# Patient Record
Sex: MALE | Race: WHITE | Employment: UNEMPLOYED | URBAN - METROPOLITAN AREA
[De-identification: names, ages, dates, MRNs, and addresses within clinical notes are randomized per-mention and may not be internally consistent; named-entity substitution may affect disease eponyms.]

---

## 2017-02-08 ENCOUNTER — OFFICE VISIT (OUTPATIENT)
Dept: FAMILY MEDICINE CLINIC | Facility: CLINIC | Age: 47
End: 2017-02-08

## 2017-02-08 VITALS
BODY MASS INDEX: 33.59 KG/M2 | HEIGHT: 67 IN | DIASTOLIC BLOOD PRESSURE: 78 MMHG | HEART RATE: 78 BPM | RESPIRATION RATE: 16 BRPM | SYSTOLIC BLOOD PRESSURE: 122 MMHG | TEMPERATURE: 98 F | WEIGHT: 214 LBS

## 2017-02-08 DIAGNOSIS — E66.9 OBESITY (BMI 30-39.9): ICD-10-CM

## 2017-02-08 DIAGNOSIS — I10 ESSENTIAL HYPERTENSION: ICD-10-CM

## 2017-02-08 DIAGNOSIS — E78.00 PURE HYPERCHOLESTEROLEMIA: ICD-10-CM

## 2017-02-08 DIAGNOSIS — Z51.81 ENCOUNTER FOR THERAPEUTIC DRUG MONITORING: Primary | ICD-10-CM

## 2017-02-08 PROCEDURE — 99214 OFFICE O/P EST MOD 30 MIN: CPT | Performed by: INTERNAL MEDICINE

## 2017-02-08 RX ORDER — NEBIVOLOL 10 MG/1
10 TABLET ORAL
Qty: 90 TABLET | Refills: 3 | Status: SHIPPED | OUTPATIENT
Start: 2017-02-08 | End: 2018-06-27

## 2017-02-08 RX ORDER — ATORVASTATIN CALCIUM 10 MG/1
10 TABLET, FILM COATED ORAL
Qty: 90 TABLET | Refills: 3 | Status: SHIPPED | OUTPATIENT
Start: 2017-02-08 | End: 2018-06-27

## 2017-02-08 RX ORDER — PHENTERMINE HYDROCHLORIDE 37.5 MG/1
37.5 TABLET ORAL
Qty: 30 TABLET | Refills: 0 | Status: SHIPPED | OUTPATIENT
Start: 2017-02-08 | End: 2017-04-26

## 2017-02-08 RX ORDER — DIBASIC SODIUM PHOSPHATE, MONOBASIC POTASSIUM PHOSPHATE AND MONOBASIC SODIUM PHOSPHATE 852; 155; 130 MG/1; MG/1; MG/1
1 TABLET ORAL DAILY
COMMUNITY
Start: 2017-01-12 | End: 2020-10-29

## 2017-02-08 RX ORDER — DASATINIB 100 MG/1
100 TABLET ORAL DAILY
COMMUNITY
Start: 2017-02-01

## 2017-02-08 NOTE — PROGRESS NOTES
CC: Patient presents with:  Weight Check: up 15 lbs       HPI:   1. Obesity, worsening wt gain, worsening hunger, phentermine used to help. 2. Essential hypertension, doing well on losartan and bystolic  3.  Pure hypercholesterolemia, doing well on li tag     Skin cancer     Headache(784.0)     Flushing     Encounter for therapeutic drug monitoring     Lung mass     Hypokalemia     Otitis externa     Dry mouth     Otitis media     Ear pressure     CML (chronic myelocytic leukemia) (HCC)     Otitis media Obesity (BMI 30-39.9), pt with 15 lbs wt gain since last time was seen for wt loss back in 7/2016. Was on phentermine, trokendi and belviq in the past, total wt loss of 45 lbs, s/p lap band surgery.  Will restart phentermine 37.5mg and do monthly for 3-6 mo

## 2017-02-18 ENCOUNTER — HOSPITAL ENCOUNTER (EMERGENCY)
Age: 47
Discharge: HOME | End: 2017-02-18
Attending: EMERGENCY MEDICINE
Payer: COMMERCIAL

## 2017-02-18 VITALS
OXYGEN SATURATION: 97 % | SYSTOLIC BLOOD PRESSURE: 115 MMHG | BODY MASS INDEX: 32 KG/M2 | HEART RATE: 79 BPM | WEIGHT: 205 LBS | RESPIRATION RATE: 20 BRPM | TEMPERATURE: 98 F | DIASTOLIC BLOOD PRESSURE: 66 MMHG

## 2017-02-18 DIAGNOSIS — J01.90 ACUTE SINUSITIS, RECURRENCE NOT SPECIFIED, UNSPECIFIED LOCATION: Primary | ICD-10-CM

## 2017-02-18 PROCEDURE — 99283 EMERGENCY DEPT VISIT LOW MDM: CPT

## 2017-02-18 RX ORDER — AMOXICILLIN 875 MG/1
875 TABLET, COATED ORAL 2 TIMES DAILY
Qty: 20 TABLET | Refills: 0 | Status: SHIPPED | OUTPATIENT
Start: 2017-02-18 | End: 2017-02-28

## 2017-02-18 NOTE — ED PROVIDER NOTES
Patient Seen in: THE [facility] Emergency Department In [place]    History   Patient presents with:  Ear Problem Pain (neurosensory)    Stated Complaint: pt states he is having sob. ear pain is on chemo treatments     HPI    Patient is a pleasant 49-year-old differently: 10 mg. Potassium Chloride ER 20 MEQ Oral Tab CR,  Take 1 tablet (20 mEq total) by mouth daily. Losartan Potassium 100 MG Oral Tab,  Take 1 tablet (100 mg total) by mouth daily.    Ascorbic Acid (VITAMIN C) 1000 MG Oral Tab,  Take 1,000 mg SKIN: Skin is warm and dry. EXTREMITIES: The patient moves all 4 extremities freely. No cyanosis, clubbing, or edema. NEUROLOGIC: The patient is awake, alert, and oriented x3. Cranial nerves are grossly intact.  There is no gross motor or sensory def

## 2017-02-18 NOTE — ED INITIAL ASSESSMENT (HPI)
State congestion and having trouble hearing. Thinks that he may have an ear infection.  Has been on chemo for one year

## 2017-04-11 ENCOUNTER — TELEPHONE (OUTPATIENT)
Dept: INTERNAL MEDICINE CLINIC | Facility: CLINIC | Age: 47
End: 2017-04-11

## 2017-04-24 ENCOUNTER — APPOINTMENT (OUTPATIENT)
Dept: LAB | Age: 47
End: 2017-04-24
Attending: INTERNAL MEDICINE
Payer: COMMERCIAL

## 2017-04-24 DIAGNOSIS — I10 ESSENTIAL HYPERTENSION: ICD-10-CM

## 2017-04-24 DIAGNOSIS — E78.00 PURE HYPERCHOLESTEROLEMIA: ICD-10-CM

## 2017-04-24 DIAGNOSIS — E66.9 OBESITY (BMI 30-39.9): ICD-10-CM

## 2017-04-24 DIAGNOSIS — Z51.81 ENCOUNTER FOR THERAPEUTIC DRUG MONITORING: ICD-10-CM

## 2017-04-24 PROCEDURE — 82607 VITAMIN B-12: CPT | Performed by: INTERNAL MEDICINE

## 2017-04-24 PROCEDURE — 80053 COMPREHEN METABOLIC PANEL: CPT | Performed by: INTERNAL MEDICINE

## 2017-04-24 PROCEDURE — 84443 ASSAY THYROID STIM HORMONE: CPT | Performed by: INTERNAL MEDICINE

## 2017-04-24 PROCEDURE — 82306 VITAMIN D 25 HYDROXY: CPT | Performed by: INTERNAL MEDICINE

## 2017-04-24 PROCEDURE — 36415 COLL VENOUS BLD VENIPUNCTURE: CPT | Performed by: INTERNAL MEDICINE

## 2017-04-24 PROCEDURE — 80061 LIPID PANEL: CPT | Performed by: INTERNAL MEDICINE

## 2017-04-26 ENCOUNTER — OFFICE VISIT (OUTPATIENT)
Dept: FAMILY MEDICINE CLINIC | Facility: CLINIC | Age: 47
End: 2017-04-26

## 2017-04-26 VITALS
WEIGHT: 213 LBS | HEART RATE: 88 BPM | BODY MASS INDEX: 33.43 KG/M2 | DIASTOLIC BLOOD PRESSURE: 76 MMHG | SYSTOLIC BLOOD PRESSURE: 118 MMHG | RESPIRATION RATE: 16 BRPM | HEIGHT: 67 IN

## 2017-04-26 DIAGNOSIS — Z51.81 ENCOUNTER FOR THERAPEUTIC DRUG MONITORING: Primary | ICD-10-CM

## 2017-04-26 DIAGNOSIS — E55.9 VITAMIN D DEFICIENCY: ICD-10-CM

## 2017-04-26 DIAGNOSIS — E66.9 OBESITY (BMI 30-39.9): ICD-10-CM

## 2017-04-26 PROCEDURE — 99213 OFFICE O/P EST LOW 20 MIN: CPT | Performed by: INTERNAL MEDICINE

## 2017-04-26 RX ORDER — ERGOCALCIFEROL 1.25 MG/1
50000 CAPSULE ORAL WEEKLY
Qty: 12 CAPSULE | Refills: 3 | Status: SHIPPED | OUTPATIENT
Start: 2017-04-26 | End: 2018-04-26

## 2017-04-26 RX ORDER — PHENTERMINE HYDROCHLORIDE 37.5 MG/1
37.5 TABLET ORAL
Qty: 30 TABLET | Refills: 0 | Status: SHIPPED | OUTPATIENT
Start: 2017-04-26 | End: 2017-08-14

## 2017-04-26 RX ORDER — ESCITALOPRAM OXALATE 10 MG/1
10 TABLET ORAL EVERY MORNING
Qty: 30 TABLET | Refills: 11 | Status: SHIPPED | OUTPATIENT
Start: 2017-04-26 | End: 2018-06-27

## 2017-04-26 NOTE — PROGRESS NOTES
CC: Patient presents with:  Weight Check: down 1 lb       HPI:   Obesity, doing well on phentermine, no chest pain. Current Outpatient Prescriptions:  ergocalciferol 37649 units Oral Cap Take 1 capsule (50,000 Units total) by mouth once a week. therapeutic drug monitoring     Lung mass     Hypokalemia     Otitis externa     Dry mouth     Otitis media     Ear pressure     CML (chronic myelocytic leukemia) (HCC)     Otitis media without spontaneous rupture of tympanic membrane     Colon polyps

## 2017-05-04 ENCOUNTER — CHARTING TRANS (OUTPATIENT)
Dept: OTHER | Age: 47
End: 2017-05-04

## 2017-08-14 ENCOUNTER — OFFICE VISIT (OUTPATIENT)
Dept: FAMILY MEDICINE CLINIC | Facility: CLINIC | Age: 47
End: 2017-08-14

## 2017-08-14 VITALS
TEMPERATURE: 97 F | DIASTOLIC BLOOD PRESSURE: 72 MMHG | BODY MASS INDEX: 35.31 KG/M2 | SYSTOLIC BLOOD PRESSURE: 122 MMHG | HEART RATE: 76 BPM | RESPIRATION RATE: 16 BRPM | HEIGHT: 67 IN | WEIGHT: 225 LBS

## 2017-08-14 DIAGNOSIS — Z51.81 ENCOUNTER FOR THERAPEUTIC DRUG MONITORING: Primary | ICD-10-CM

## 2017-08-14 DIAGNOSIS — E55.9 VITAMIN D DEFICIENCY: ICD-10-CM

## 2017-08-14 DIAGNOSIS — E66.9 OBESITY (BMI 30-39.9): ICD-10-CM

## 2017-08-14 PROCEDURE — 99213 OFFICE O/P EST LOW 20 MIN: CPT | Performed by: INTERNAL MEDICINE

## 2017-08-14 RX ORDER — PHENTERMINE HYDROCHLORIDE 37.5 MG/1
37.5 TABLET ORAL
Qty: 30 TABLET | Refills: 0 | Status: SHIPPED | OUTPATIENT
Start: 2017-08-14 | End: 2017-11-01

## 2017-08-14 NOTE — PROGRESS NOTES
CC: Patient presents with:  Weight Check: gained 12 lbs. Ran out of med 05/26/17. HPI:   Obesity, worsening wt gain, was on phentermine, run out. Doing well on vit D. No chest pain.        Current Outpatient Prescriptions:  Phentermine HCl 37.5 Skin tag     Skin cancer     Headache(784.0)     Flushing     Encounter for therapeutic drug monitoring     Lung mass     Hypokalemia     Otitis externa     Dry mouth     Otitis media     Ear pressure     CML (chronic myelocytic leukemia) (HCC)     Otitis

## 2017-08-30 ENCOUNTER — HOSPITAL ENCOUNTER (OUTPATIENT)
Age: 47
Discharge: HOME | End: 2017-08-30
Attending: FAMILY MEDICINE
Payer: COMMERCIAL

## 2017-08-30 VITALS
HEART RATE: 74 BPM | TEMPERATURE: 98 F | RESPIRATION RATE: 16 BRPM | SYSTOLIC BLOOD PRESSURE: 147 MMHG | OXYGEN SATURATION: 98 % | DIASTOLIC BLOOD PRESSURE: 87 MMHG

## 2017-08-30 DIAGNOSIS — L03.213 PERIORBITAL CELLULITIS OF RIGHT EYE: Primary | ICD-10-CM

## 2017-08-30 DIAGNOSIS — H01.001 BLEPHARITIS OF RIGHT UPPER EYELID, UNSPECIFIED TYPE: ICD-10-CM

## 2017-08-30 PROCEDURE — 99214 OFFICE O/P EST MOD 30 MIN: CPT

## 2017-08-30 PROCEDURE — 99213 OFFICE O/P EST LOW 20 MIN: CPT

## 2017-08-30 RX ORDER — CLINDAMYCIN HYDROCHLORIDE 300 MG/1
300 CAPSULE ORAL 3 TIMES DAILY
Qty: 30 CAPSULE | Refills: 0 | Status: SHIPPED | OUTPATIENT
Start: 2017-08-30 | End: 2017-09-09

## 2017-08-30 RX ORDER — CIPROFLOXACIN HYDROCHLORIDE 3.5 MG/ML
SOLUTION/ DROPS TOPICAL
Qty: 1 BOTTLE | Refills: 0 | Status: SHIPPED | OUTPATIENT
Start: 2017-08-30 | End: 2017-09-05

## 2017-08-30 NOTE — ED PROVIDER NOTES
Patient Seen in: THE [facility] In [facility]    History   Patient presents with:  Eye Problem    Stated Complaint: right eye area swelling    HPI  47 yo M here with R eye swelling and redness   Started as a stye in the eye 1 week ago and rubbed Take 1 tablet by mouth daily. SPRYCEL 100 MG Oral Tab,  Take 1 tablet by mouth daily. Nebivolol HCl (BYSTOLIC) 10 MG Oral Tab,  Take 1 tablet (10 mg total) by mouth once daily.    Atorvastatin Calcium 10 MG Oral Tab,  Take 1 tablet (10 mg total) by mout Normocephalic, atraumatic  EENT: OP - wnl, moist, Nares normal  NECK: FROM, supple  LUNGS: No tachypnea   CV: No tachycardia   ABD: not distended  NEURO: Alert and oriented to person place and time  GAIT: Normal    R eye exam:   - Orbits and periorbital ar from taking the antibiotic  Warm compresses at least 3 times a day, do not touch the eye  Hand hygiene emphasized  Off of work until September 1, 2017  Follow-up with primary care doctor in the next 5-7 days      Disposition and Plan     Clinical Impressio

## 2017-08-30 NOTE — ED INITIAL ASSESSMENT (HPI)
Pt c/o 7 days of erythema and edema to right upper eyelid. States feels sore. No itch.   Visual acuity 20/30 right and 20/30 left

## 2017-09-01 RX ORDER — LOSARTAN POTASSIUM 100 MG/1
TABLET ORAL
Qty: 90 TABLET | Refills: 1 | Status: SHIPPED | OUTPATIENT
Start: 2017-09-01 | End: 2018-06-27

## 2017-09-01 NOTE — TELEPHONE ENCOUNTER
Requesting Losartan Potassium 100mg daily  LOV: 2/8/17 for htn  RTC: 1 month for weight loss  Last Labs: 4/24/17  Filled: 7/27/16 #90 with 3 refills    Future Appointments  Date Time Provider Gray Douglas   9/14/2017 5:15 PM Tyler Acevedo MD [facility] EM

## 2017-09-07 NOTE — TELEPHONE ENCOUNTER
Requesting klor-con   LOV: 8/14/17  RTC: 4 weeks   Last Labs: 4/24/17: 4.2  Filled: 7/27/16 #90 with 3 refills    Future Appointments  Date Time Provider Gray Douglas   9/14/2017 5:15 PM Kirt Morales MD [address]   10/24/2017 3:45 PM Gisela Matthew

## 2017-09-10 RX ORDER — POTASSIUM CHLORIDE 20 MEQ/1
TABLET, EXTENDED RELEASE ORAL
Qty: 90 TABLET | Refills: 3 | Status: SHIPPED | OUTPATIENT
Start: 2017-09-10 | End: 2018-09-11

## 2017-09-14 ENCOUNTER — OFFICE VISIT (OUTPATIENT)
Dept: INTERNAL MEDICINE CLINIC | Facility: CLINIC | Age: 47
End: 2017-09-14

## 2017-09-14 VITALS
WEIGHT: 230 LBS | RESPIRATION RATE: 16 BRPM | HEART RATE: 80 BPM | SYSTOLIC BLOOD PRESSURE: 128 MMHG | DIASTOLIC BLOOD PRESSURE: 78 MMHG | HEIGHT: 67 IN | BODY MASS INDEX: 36.1 KG/M2

## 2017-09-14 DIAGNOSIS — R53.82 CHRONIC FATIGUE: ICD-10-CM

## 2017-09-14 DIAGNOSIS — E66.9 OBESITY (BMI 30-39.9): ICD-10-CM

## 2017-09-14 DIAGNOSIS — Z51.81 ENCOUNTER FOR THERAPEUTIC DRUG MONITORING: Primary | ICD-10-CM

## 2017-09-14 PROBLEM — R53.83 FATIGUE: Status: ACTIVE | Noted: 2017-09-14

## 2017-09-14 PROCEDURE — 99213 OFFICE O/P EST LOW 20 MIN: CPT | Performed by: INTERNAL MEDICINE

## 2017-09-14 RX ORDER — PHENDIMETRAZINE TARTRATE 105 MG/1
CAPSULE, EXTENDED RELEASE ORAL
Qty: 30 CAPSULE | Refills: 0 | Status: SHIPPED | OUTPATIENT
Start: 2017-09-14 | End: 2018-10-22

## 2017-09-14 NOTE — PROGRESS NOTES
CC: Patient presents with:  Weight Check: up 5 lb       HPI:   Obesity, doing well on phentermine. No chest pain. Doesn't think phentermine is helping much.        Current Outpatient Prescriptions:  Phendimetrazine Tartrate  MG Oral Capsule SR 24 Dysplastic nevi     Skin tag     Skin cancer     Headache(784.0)     Flushing     Encounter for therapeutic drug monitoring     Lung mass     Hypokalemia     Otitis externa     Dry mouth     Otitis media     Ear pressure     CML (chronic myelocytic leuke

## 2017-11-01 ENCOUNTER — HOSPITAL ENCOUNTER (OUTPATIENT)
Age: 47
Discharge: HOME | End: 2017-11-01
Attending: FAMILY MEDICINE

## 2017-11-01 VITALS
TEMPERATURE: 99 F | HEART RATE: 86 BPM | DIASTOLIC BLOOD PRESSURE: 84 MMHG | RESPIRATION RATE: 20 BRPM | OXYGEN SATURATION: 98 % | SYSTOLIC BLOOD PRESSURE: 142 MMHG

## 2017-11-01 DIAGNOSIS — J01.00 ACUTE NON-RECURRENT MAXILLARY SINUSITIS: Primary | ICD-10-CM

## 2017-11-01 PROCEDURE — 99214 OFFICE O/P EST MOD 30 MIN: CPT

## 2017-11-01 PROCEDURE — 99213 OFFICE O/P EST LOW 20 MIN: CPT

## 2017-11-01 RX ORDER — AMOXICILLIN AND CLAVULANATE POTASSIUM 875; 125 MG/1; MG/1
875 TABLET, FILM COATED ORAL 2 TIMES DAILY
Qty: 20 TABLET | Refills: 0 | Status: SHIPPED | OUTPATIENT
Start: 2017-11-01 | End: 2018-01-08 | Stop reason: ALTCHOICE

## 2017-11-01 NOTE — ED PROVIDER NOTES
Patient Seen in: THE [facility]    History   Patient presents with:  Ear Problem Pain (neurosensory)    Stated Complaint: 2 1/2 wks ear issues,fever 1 wk ago    HPI    This 78-year-old male with a history for leukemia and is current Comment: once [place]      Review of Systems    Positive for stated complaint: 2 1/2 wks ear issues,fever 1 wk ago  Other systems are as noted in HPI. Constitutional and vital signs reviewed.       All other systems reviewed and negative except as noted a maxillary sinusitis  (primary encounter diagnosis)    Disposition:  Discharge    Follow-up:  Your ENT    Schedule an appointment as soon as possible for a visit in 3 days  If symptoms worsen      Medications Prescribed:  Current Discharge Medication List

## 2018-01-08 PROBLEM — J01.90 ACUTE SINUSITIS: Status: ACTIVE | Noted: 2018-01-08

## 2018-01-09 ENCOUNTER — TELEPHONE (OUTPATIENT)
Dept: FAMILY MEDICINE CLINIC | Facility: CLINIC | Age: 48
End: 2018-01-09

## 2018-01-09 NOTE — TELEPHONE ENCOUNTER
Received medical records request from [facility] for disability determination. Request is for records dated 12-1-16 to present. Faxed to [employer] for processing.

## 2018-08-26 ENCOUNTER — CHARTING TRANS (OUTPATIENT)
Dept: OTHER | Age: 48
End: 2018-08-26

## 2018-09-02 ENCOUNTER — HOSPITAL (OUTPATIENT)
Dept: OTHER | Age: 48
End: 2018-09-02
Attending: EMERGENCY MEDICINE

## 2018-09-02 LAB
ANALYZER ANC (IANC): ABNORMAL
ANION GAP SERPL CALC-SCNC: 12 MMOL/L (ref 10–20)
BASOPHILS # BLD: 0 THOUSAND/MCL (ref 0–0.3)
BASOPHILS NFR BLD: 0 %
BUN SERPL-MCNC: 15 MG/DL (ref 6–20)
BUN/CREAT SERPL: 18 (ref 7–25)
CALCIUM SERPL-MCNC: 8.4 MG/DL (ref 8.4–10.2)
CHLORIDE: 106 MMOL/L (ref 98–107)
CO2 SERPL-SCNC: 27 MMOL/L (ref 21–32)
CREAT SERPL-MCNC: 0.83 MG/DL (ref 0.67–1.17)
DIFFERENTIAL METHOD BLD: ABNORMAL
EOSINOPHIL # BLD: 0.2 THOUSAND/MCL (ref 0.1–0.5)
EOSINOPHIL NFR BLD: 2 %
ERYTHROCYTE [DISTWIDTH] IN BLOOD: 12.5 % (ref 11–15)
GLUCOSE SERPL-MCNC: 92 MG/DL (ref 65–99)
HEMATOCRIT: 39.9 % (ref 39–51)
HGB BLD-MCNC: 13.4 GM/DL (ref 13–17)
LYMPHOCYTES # BLD: 1.7 THOUSAND/MCL (ref 1–4.8)
LYMPHOCYTES NFR BLD: 17 %
MCH RBC QN AUTO: 31.4 PG (ref 26–34)
MCHC RBC AUTO-ENTMCNC: 33.6 GM/DL (ref 32–36.5)
MCV RBC AUTO: 93.4 FL (ref 78–100)
MONOCYTES # BLD: 1.3 THOUSAND/MCL (ref 0.3–0.9)
MONOCYTES NFR BLD: 13 %
NEUTROPHILS # BLD: 6.9 THOUSAND/MCL (ref 1.8–7.7)
NEUTROPHILS NFR BLD: 68 %
NEUTS SEG NFR BLD: ABNORMAL %
NRBC (NRBCRE): ABNORMAL
PLATELET # BLD: 199 THOUSAND/MCL (ref 140–450)
POTASSIUM SERPL-SCNC: 4.3 MMOL/L (ref 3.4–5.1)
RBC # BLD: 4.27 MILLION/MCL (ref 4.5–5.9)
SODIUM SERPL-SCNC: 141 MMOL/L (ref 135–145)
TROPONIN I SERPL HS-MCNC: <0.02 NG/ML
WBC # BLD: 10.1 THOUSAND/MCL (ref 4.2–11)

## 2018-09-03 ENCOUNTER — DIAGNOSTIC TRANS (OUTPATIENT)
Dept: OTHER | Age: 48
End: 2018-09-03

## 2018-10-17 ENCOUNTER — OFFICE VISIT (OUTPATIENT)
Dept: FAMILY MEDICINE CLINIC | Facility: CLINIC | Age: 48
End: 2018-10-17
Payer: COMMERCIAL

## 2018-10-17 VITALS
RESPIRATION RATE: 16 BRPM | OXYGEN SATURATION: 98 % | BODY MASS INDEX: 36.88 KG/M2 | WEIGHT: 235 LBS | SYSTOLIC BLOOD PRESSURE: 144 MMHG | TEMPERATURE: 98 F | HEIGHT: 67 IN | HEART RATE: 78 BPM | DIASTOLIC BLOOD PRESSURE: 98 MMHG

## 2018-10-17 DIAGNOSIS — H92.01 OTALGIA, RIGHT: ICD-10-CM

## 2018-10-17 DIAGNOSIS — Z87.891 FORMER SMOKER: ICD-10-CM

## 2018-10-17 DIAGNOSIS — I10 ESSENTIAL HYPERTENSION: ICD-10-CM

## 2018-10-17 DIAGNOSIS — J01.01 ACUTE RECURRENT MAXILLARY SINUSITIS: Primary | ICD-10-CM

## 2018-10-17 PROCEDURE — 99213 OFFICE O/P EST LOW 20 MIN: CPT | Performed by: NURSE PRACTITIONER

## 2018-10-17 RX ORDER — OFLOXACIN 3 MG/ML
SOLUTION AURICULAR (OTIC)
Qty: 1 BOTTLE | Refills: 0 | Status: SHIPPED | OUTPATIENT
Start: 2018-10-17 | End: 2018-11-07 | Stop reason: ALTCHOICE

## 2018-10-17 RX ORDER — DOXYCYCLINE HYCLATE 100 MG
100 TABLET ORAL 2 TIMES DAILY
Qty: 20 TABLET | Refills: 0 | Status: SHIPPED | OUTPATIENT
Start: 2018-10-17 | End: 2018-10-27

## 2018-10-17 RX ORDER — METHYLPREDNISOLONE 4 MG/1
TABLET ORAL
Qty: 1 KIT | Refills: 0 | Status: SHIPPED | OUTPATIENT
Start: 2018-10-17 | End: 2018-11-07 | Stop reason: ALTCHOICE

## 2018-10-17 NOTE — PROGRESS NOTES
CHIEF COMPLAINT:   Patient presents with:  URI: x6 weeks. HPI:   Mary Lugo is a 50year old male who presents for sinus, cough, and RT ear symptoms for essentially  6 weeks.  He was initially seen at [facility] clinic around time of onset, treatmanny ESCITALOPRAM 10 MG Oral Tab TAKE ONE TABLET BY MOUTH EVERY MORNING Disp: 30 tablet Rfl: 11   Phentermine HCl 37.5 MG Oral Tab Take 1 tablet (37.5 mg total) by mouth every morning before breakfast. Disp: 30 tablet Rfl: 0   Phendimetrazine Tartrate  MG LUNGS: denies shortness of breath or wheezing, See HPI  CARDIOVASCULAR: denies chest pain or palpitations   GI: denies N/V/C or abdominal pain  NEURO: Denies headaches    EXAM:   BP (!) 144/98   Pulse 78   Temp 98.4 °F (36.9 °C) (Oral)   Resp 16   Ht 67\" - Breathing has been stable since last ER visit, he still has albuterol if needed but has not needed it recently. - Other comfort/OTC care as described in Patient Instructions. - Advised F/U with PCP on his elevated BP/HTN.   Pt admits he has noticed it r The sinuses are air-filled spaces within the bones of the face. They connect to the inside of the nose. Sinusitis is an inflammation of the tissue that lines the sinuses. Sinusitis can occur during a cold.  It can also happen due to allergies to pollens and · Do not use nasal rinses or irrigation during an acute sinus infection, unless your healthcare provider tells you to. Rinsing may spread the infection to other areas in your sinuses.   · Use acetaminophen or ibuprofen to control pain, unless another pain m Earaches can happen without an infection. This occurs when air and fluid build up behind the eardrum causing a feeling of fullness and discomfort and reduced hearing. This is called otitis media with effusion (OME) or serous otitis media.  It means there is · You may use over-the-counter decongestants such as phenylephrine or pseudoephedrine. But they are not always helpful. Don't use nasal spray decongestants more than 3 days. Longer use can make congestion worse.  Prescription nasal sprays from your doctor d

## 2018-10-17 NOTE — PATIENT INSTRUCTIONS
Sinusitis (Antibiotic Treatment)    The sinuses are air-filled spaces within the bones of the face. They connect to the inside of the nose. Sinusitis is an inflammation of the tissue that lines the sinuses. Sinusitis can occur during a cold.  It can also · Do not use nasal rinses or irrigation during an acute sinus infection, unless your healthcare provider tells you to. Rinsing may spread the infection to other areas in your sinuses.   · Use acetaminophen or ibuprofen to control pain, unless another pain m Earaches can happen without an infection. This occurs when air and fluid build up behind the eardrum causing a feeling of fullness and discomfort and reduced hearing. This is called otitis media with effusion (OME) or serous otitis media.  It means there is · You may use over-the-counter decongestants such as phenylephrine or pseudoephedrine. But they are not always helpful. Don't use nasal spray decongestants more than 3 days. Longer use can make congestion worse.  Prescription nasal sprays from your doctor d

## 2018-10-22 PROCEDURE — 83835 ASSAY OF METANEPHRINES: CPT | Performed by: INTERNAL MEDICINE

## 2018-10-22 PROCEDURE — 84244 ASSAY OF RENIN: CPT | Performed by: INTERNAL MEDICINE

## 2018-10-22 PROCEDURE — 82088 ASSAY OF ALDOSTERONE: CPT | Performed by: INTERNAL MEDICINE

## 2018-10-31 PROCEDURE — 83835 ASSAY OF METANEPHRINES: CPT | Performed by: INTERNAL MEDICINE

## 2018-10-31 PROCEDURE — 36415 COLL VENOUS BLD VENIPUNCTURE: CPT | Performed by: INTERNAL MEDICINE

## 2018-11-02 PROCEDURE — 36415 COLL VENOUS BLD VENIPUNCTURE: CPT | Performed by: INTERNAL MEDICINE

## 2018-11-02 PROCEDURE — 82533 TOTAL CORTISOL: CPT | Performed by: INTERNAL MEDICINE

## 2018-11-03 VITALS
HEART RATE: 90 BPM | SYSTOLIC BLOOD PRESSURE: 124 MMHG | TEMPERATURE: 99 F | BODY MASS INDEX: 32.17 KG/M2 | WEIGHT: 204.99 LBS | DIASTOLIC BLOOD PRESSURE: 78 MMHG | OXYGEN SATURATION: 97 % | HEIGHT: 67 IN

## 2018-11-27 VITALS
DIASTOLIC BLOOD PRESSURE: 90 MMHG | RESPIRATION RATE: 16 BRPM | WEIGHT: 204 LBS | TEMPERATURE: 99.1 F | HEART RATE: 80 BPM | BODY MASS INDEX: 31.95 KG/M2 | SYSTOLIC BLOOD PRESSURE: 148 MMHG

## 2018-12-07 ENCOUNTER — OFFICE VISIT (OUTPATIENT)
Dept: FAMILY MEDICINE CLINIC | Facility: CLINIC | Age: 48
End: 2018-12-07
Payer: COMMERCIAL

## 2018-12-07 VITALS
WEIGHT: 230 LBS | RESPIRATION RATE: 14 BRPM | HEIGHT: 67 IN | OXYGEN SATURATION: 98 % | HEART RATE: 89 BPM | TEMPERATURE: 98 F | BODY MASS INDEX: 36.1 KG/M2 | DIASTOLIC BLOOD PRESSURE: 86 MMHG | SYSTOLIC BLOOD PRESSURE: 120 MMHG

## 2018-12-07 DIAGNOSIS — H92.03 OTALGIA, BILATERAL: ICD-10-CM

## 2018-12-07 DIAGNOSIS — J01.00 ACUTE MAXILLARY SINUSITIS, RECURRENCE NOT SPECIFIED: Primary | ICD-10-CM

## 2018-12-07 PROCEDURE — 99213 OFFICE O/P EST LOW 20 MIN: CPT | Performed by: PHYSICIAN ASSISTANT

## 2018-12-07 RX ORDER — OFLOXACIN 3 MG/ML
10 SOLUTION AURICULAR (OTIC) DAILY
Qty: 10 ML | Refills: 0 | Status: SHIPPED | OUTPATIENT
Start: 2018-12-07 | End: 2019-11-05 | Stop reason: ALTCHOICE

## 2018-12-07 RX ORDER — DOXYCYCLINE HYCLATE 100 MG/1
100 CAPSULE ORAL 2 TIMES DAILY
Qty: 20 CAPSULE | Refills: 0 | Status: SHIPPED | OUTPATIENT
Start: 2018-12-07 | End: 2018-12-17

## 2018-12-07 RX ORDER — METHYLPREDNISOLONE 4 MG/1
TABLET ORAL
Qty: 1 KIT | Refills: 0 | Status: SHIPPED | OUTPATIENT
Start: 2018-12-07 | End: 2019-11-05 | Stop reason: ALTCHOICE

## 2018-12-07 NOTE — PROGRESS NOTES
CHIEF COMPLAINT:   Patient presents with:  Sinus Problem: 2 weeks, maxillary sinus pain/pressure, purulent nasal drainage, 102 fever a couple days ago       HPI:   Shemar Vinson is a 50year old male who presents for sinus congestion for 2 weeks.  Symptom LOSARTAN 100 MG Oral Tab TAKE ONE TABLET BY MOUTH DAILY Disp: 30 tablet Rfl: 11   ESCITALOPRAM 10 MG Oral Tab TAKE ONE TABLET BY MOUTH EVERY MORNING Disp: 30 tablet Rfl: 11   PHOSPHA 250 NEUTRAL 155-852-130 MG Oral Tab Take 1 tablet by mouth daily.  Disp: numbness or tingling in face.     EXAM:   /86   Pulse 89   Temp 97.9 °F (36.6 °C) (Oral)   Resp 14   SpO2 98%   GENERAL: well developed, well nourished,in no apparent distress  SKIN: no rashes,no suspicious lesions  HEAD: atraumatic, normocephalic,  + Prescriptions Disp Refills   • Doxycycline Hyclate 100 MG Oral Cap 20 capsule 0     Sig: Take 1 capsule (100 mg total) by mouth 2 (two) times daily for 10 days. • methylPREDNISolone (MEDROL) 4 MG Oral Tablet Therapy Pack 1 kit 0     Sig: As directed.    • your throat. · Avoid pseudoephedrine or phenylephrine if you have heart problems or blood pressure issues like hypertension. These medications will raise your blood pressure.   · Female only: If taking birth control - Use back up birth control for pregna

## 2019-09-25 ENCOUNTER — WALK IN (OUTPATIENT)
Dept: URGENT CARE | Age: 49
End: 2019-09-25

## 2019-09-25 VITALS
RESPIRATION RATE: 16 BRPM | BODY MASS INDEX: 41.82 KG/M2 | DIASTOLIC BLOOD PRESSURE: 82 MMHG | HEART RATE: 72 BPM | WEIGHT: 266.43 LBS | TEMPERATURE: 98.4 F | SYSTOLIC BLOOD PRESSURE: 140 MMHG | HEIGHT: 67 IN

## 2019-09-25 DIAGNOSIS — B96.89 ACUTE BACTERIAL SINUSITIS: ICD-10-CM

## 2019-09-25 DIAGNOSIS — J01.90 ACUTE BACTERIAL SINUSITIS: ICD-10-CM

## 2019-09-25 DIAGNOSIS — H66.002 ACUTE SUPPURATIVE OTITIS MEDIA OF LEFT EAR WITHOUT SPONTANEOUS RUPTURE OF TYMPANIC MEMBRANE, RECURRENCE NOT SPECIFIED: Primary | ICD-10-CM

## 2019-09-25 PROCEDURE — X0943 AMG SELF PAY VISIT: HCPCS | Performed by: NURSE PRACTITIONER

## 2019-09-25 RX ORDER — ERGOCALCIFEROL 1.25 MG/1
CAPSULE ORAL
COMMUNITY
Start: 2018-10-29

## 2019-09-25 RX ORDER — AMOXICILLIN AND CLAVULANATE POTASSIUM 875; 125 MG/1; MG/1
1 TABLET, FILM COATED ORAL 2 TIMES DAILY
Qty: 20 TABLET | Refills: 0 | Status: SHIPPED | OUTPATIENT
Start: 2019-09-25 | End: 2019-10-05

## 2019-09-25 RX ORDER — CARVEDILOL 12.5 MG/1
12.5 TABLET ORAL
COMMUNITY
Start: 2019-09-02 | End: 2020-08-27

## 2019-09-25 RX ORDER — CLONAZEPAM 1 MG/1
1 TABLET ORAL
COMMUNITY
Start: 2017-01-17

## 2019-09-25 RX ORDER — ATORVASTATIN CALCIUM 10 MG/1
TABLET, FILM COATED ORAL
COMMUNITY
Start: 2015-09-01

## 2019-09-25 RX ORDER — ESCITALOPRAM OXALATE 10 MG/1
TABLET ORAL
COMMUNITY
Start: 2018-07-29

## 2019-09-25 RX ORDER — LOSARTAN POTASSIUM 100 MG/1
TABLET ORAL
COMMUNITY
Start: 2018-07-29

## 2019-09-25 RX ORDER — HYDROCHLOROTHIAZIDE 25 MG/1
25 TABLET ORAL
COMMUNITY
Start: 2018-10-22 | End: 2019-10-17

## 2019-09-25 ASSESSMENT — ENCOUNTER SYMPTOMS
RHINORRHEA: 1
EYES NEGATIVE: 1
SINUS PRESSURE: 1
HEADACHES: 1
WHEEZING: 0
COUGH: 0
SORE THROAT: 1
SHORTNESS OF BREATH: 0
CHILLS: 0
RESPIRATORY NEGATIVE: 1
ALLERGIC/IMMUNOLOGIC NEGATIVE: 1
FEVER: 1
TROUBLE SWALLOWING: 0

## 2019-10-21 ENCOUNTER — HOSPITAL (OUTPATIENT)
Dept: OTHER | Age: 49
End: 2019-10-21

## 2019-10-21 ENCOUNTER — DIAGNOSTIC TRANS (OUTPATIENT)
Dept: OTHER | Age: 49
End: 2019-10-21

## 2019-10-21 LAB
ANALYZER ANC (IANC): ABNORMAL
ANION GAP SERPL CALC-SCNC: 10 MMOL/L (ref 10–20)
BASOPHILS # BLD: 0 K/MCL (ref 0–0.3)
BASOPHILS NFR BLD: 1 %
BUN SERPL-MCNC: 13 MG/DL (ref 6–20)
BUN/CREAT SERPL: 20 (ref 7–25)
CALCIUM SERPL-MCNC: 8.4 MG/DL (ref 8.4–10.2)
CHLORIDE SERPL-SCNC: 109 MMOL/L (ref 98–107)
CO2 SERPL-SCNC: 24 MMOL/L (ref 21–32)
CREAT SERPL-MCNC: 0.66 MG/DL (ref 0.67–1.17)
D DIMER PPP FEU-MCNC: 0.31 MG/L (FEU)
D DIMER PPP FEU-MCNC: NORMAL
DIFFERENTIAL METHOD BLD: ABNORMAL
EOSINOPHIL # BLD: 0.2 K/MCL (ref 0.1–0.5)
EOSINOPHIL NFR BLD: 2 %
ERYTHROCYTE [DISTWIDTH] IN BLOOD: 13.3 % (ref 11–15)
GLUCOSE SERPL-MCNC: 150 MG/DL (ref 65–99)
HCT VFR BLD CALC: 39 % (ref 39–51)
HGB BLD-MCNC: 12.9 G/DL (ref 13–17)
IMM GRANULOCYTES # BLD AUTO: 0 K/MCL (ref 0–0.2)
IMM GRANULOCYTES NFR BLD: 0 %
LYMPHOCYTES # BLD: 1 K/MCL (ref 1–4.8)
LYMPHOCYTES NFR BLD: 15 %
MCH RBC QN AUTO: 29.4 PG (ref 26–34)
MCHC RBC AUTO-ENTMCNC: 33.1 G/DL (ref 32–36.5)
MCV RBC AUTO: 88.8 FL (ref 78–100)
MONOCYTES # BLD: 0.5 K/MCL (ref 0.3–0.9)
MONOCYTES NFR BLD: 7 %
NEUTROPHILS # BLD: 5 K/MCL (ref 1.8–7.7)
NEUTROPHILS NFR BLD: 75 %
NEUTS SEG NFR BLD: ABNORMAL %
NRBC (NRBCRE): 0 /100 WBC
NT-PROBNP SERPL-MCNC: 44 PG/ML
PLATELET # BLD: 184 K/MCL (ref 140–450)
POTASSIUM SERPL-SCNC: 3.4 MMOL/L (ref 3.4–5.1)
RBC # BLD: 4.39 MIL/MCL (ref 4.5–5.9)
SODIUM SERPL-SCNC: 140 MMOL/L (ref 135–145)
TROPONIN I SERPL HS-MCNC: <0.02 NG/ML
WBC # BLD: 6.8 K/MCL (ref 4.2–11)

## 2019-11-26 ENCOUNTER — HOSPITAL (OUTPATIENT)
Dept: OTHER | Age: 49
End: 2019-11-26

## 2019-11-26 ENCOUNTER — DIAGNOSTIC TRANS (OUTPATIENT)
Dept: OTHER | Age: 49
End: 2019-11-26

## 2019-11-26 LAB
ALBUMIN SERPL-MCNC: 3.7 G/DL (ref 3.6–5.1)
ALBUMIN/GLOB SERPL: 1 {RATIO} (ref 1–2.4)
ALP SERPL-CCNC: 99 UNITS/L (ref 45–117)
ALT SERPL-CCNC: 28 UNITS/L
ANALYZER ANC (IANC): ABNORMAL
ANION GAP SERPL CALC-SCNC: 9 MMOL/L (ref 10–20)
AST SERPL-CCNC: 19 UNITS/L
BASOPHILS # BLD: 0 K/MCL (ref 0–0.3)
BASOPHILS NFR BLD: 0 %
BILIRUB SERPL-MCNC: 0.6 MG/DL (ref 0.2–1)
BUN SERPL-MCNC: 17 MG/DL (ref 6–20)
BUN/CREAT SERPL: 21 (ref 7–25)
CALCIUM SERPL-MCNC: 8.3 MG/DL (ref 8.4–10.2)
CHLORIDE SERPL-SCNC: 108 MMOL/L (ref 98–107)
CO2 SERPL-SCNC: 28 MMOL/L (ref 21–32)
CREAT SERPL-MCNC: 0.81 MG/DL (ref 0.67–1.17)
DIFFERENTIAL METHOD BLD: ABNORMAL
EOSINOPHIL # BLD: 0.1 K/MCL (ref 0.1–0.5)
EOSINOPHIL NFR BLD: 1 %
ERYTHROCYTE [DISTWIDTH] IN BLOOD: 13.7 % (ref 11–15)
GLOBULIN SER-MCNC: 3.6 G/DL (ref 2–4)
GLUCOSE SERPL-MCNC: 113 MG/DL (ref 65–99)
HCT VFR BLD CALC: 38.9 % (ref 39–51)
HGB BLD-MCNC: 12.7 G/DL (ref 13–17)
IMM GRANULOCYTES # BLD AUTO: 0 K/MCL (ref 0–0.2)
IMM GRANULOCYTES NFR BLD: 0 %
LYMPHOCYTES # BLD: 1.1 K/MCL (ref 1–4.8)
LYMPHOCYTES NFR BLD: 15 %
MCH RBC QN AUTO: 30 PG (ref 26–34)
MCHC RBC AUTO-ENTMCNC: 32.6 G/DL (ref 32–36.5)
MCV RBC AUTO: 91.7 FL (ref 78–100)
MONOCYTES # BLD: 0.8 K/MCL (ref 0.3–0.9)
MONOCYTES NFR BLD: 10 %
NEUTROPHILS # BLD: 5.4 K/MCL (ref 1.8–7.7)
NEUTROPHILS NFR BLD: 74 %
NEUTS SEG NFR BLD: ABNORMAL %
NRBC (NRBCRE): 0 /100 WBC
PLATELET # BLD: 205 K/MCL (ref 140–450)
POTASSIUM SERPL-SCNC: 3.7 MMOL/L (ref 3.4–5.1)
POTASSIUM SERPL-SCNC: ABNORMAL MMOL/L
PROT SERPL-MCNC: 7.3 G/DL (ref 6.4–8.2)
RBC # BLD: 4.24 MIL/MCL (ref 4.5–5.9)
SODIUM SERPL-SCNC: 141 MMOL/L (ref 135–145)
TROPONIN I SERPL HS-MCNC: <0.02 NG/ML
WBC # BLD: 7.5 K/MCL (ref 4.2–11)

## 2019-11-27 ENCOUNTER — TELEPHONE (OUTPATIENT)
Dept: NEUROSURGERY | Age: 49
End: 2019-11-27

## 2019-11-30 ENCOUNTER — HOSPITAL (OUTPATIENT)
Dept: OTHER | Age: 49
End: 2019-11-30

## 2019-11-30 ENCOUNTER — DIAGNOSTIC TRANS (OUTPATIENT)
Dept: OTHER | Age: 49
End: 2019-11-30

## 2019-11-30 LAB
ANALYZER ANC (IANC): ABNORMAL
ANION GAP SERPL CALC-SCNC: 8 MMOL/L (ref 10–20)
BASOPHILS # BLD: 0.1 K/MCL (ref 0–0.3)
BASOPHILS NFR BLD: 1 %
BUN SERPL-MCNC: 17 MG/DL (ref 6–20)
BUN/CREAT SERPL: 19 (ref 7–25)
CALCIUM SERPL-MCNC: 8.2 MG/DL (ref 8.4–10.2)
CHLORIDE SERPL-SCNC: 108 MMOL/L (ref 98–107)
CO2 SERPL-SCNC: 27 MMOL/L (ref 21–32)
CREAT SERPL-MCNC: 0.88 MG/DL (ref 0.67–1.17)
DIFFERENTIAL METHOD BLD: ABNORMAL
EOSINOPHIL # BLD: 0.2 K/MCL (ref 0.1–0.5)
EOSINOPHIL NFR BLD: 3 %
ERYTHROCYTE [DISTWIDTH] IN BLOOD: 13.5 % (ref 11–15)
GLUCOSE SERPL-MCNC: 118 MG/DL (ref 65–99)
HCT VFR BLD CALC: 36.5 % (ref 39–51)
HGB BLD-MCNC: 12.2 G/DL (ref 13–17)
IMM GRANULOCYTES # BLD AUTO: 0 K/MCL (ref 0–0.2)
IMM GRANULOCYTES NFR BLD: 0 %
LYMPHOCYTES # BLD: 1.8 K/MCL (ref 1–4.8)
LYMPHOCYTES NFR BLD: 23 %
MCH RBC QN AUTO: 30.3 PG (ref 26–34)
MCHC RBC AUTO-ENTMCNC: 33.4 G/DL (ref 32–36.5)
MCV RBC AUTO: 90.8 FL (ref 78–100)
MONOCYTES # BLD: 0.8 K/MCL (ref 0.3–0.9)
MONOCYTES NFR BLD: 10 %
NEUTROPHILS # BLD: 5 K/MCL (ref 1.8–7.7)
NEUTROPHILS NFR BLD: 63 %
NEUTS SEG NFR BLD: ABNORMAL %
NRBC (NRBCRE): 0 /100 WBC
PLATELET # BLD: 189 K/MCL (ref 140–450)
POTASSIUM SERPL-SCNC: 3.6 MMOL/L (ref 3.4–5.1)
RBC # BLD: 4.02 MIL/MCL (ref 4.5–5.9)
SODIUM SERPL-SCNC: 139 MMOL/L (ref 135–145)
TROPONIN I SERPL HS-MCNC: <0.02 NG/ML
WBC # BLD: 7.8 K/MCL (ref 4.2–11)

## 2019-12-05 ENCOUNTER — IMAGING SERVICES (OUTPATIENT)
Dept: GENERAL RADIOLOGY | Age: 49
End: 2019-12-05

## 2019-12-05 ENCOUNTER — HOSPITAL (OUTPATIENT)
Dept: OTHER | Age: 49
End: 2019-12-05
Attending: PHYSICIAN ASSISTANT

## 2019-12-05 ENCOUNTER — OFFICE VISIT (OUTPATIENT)
Dept: NEUROSURGERY | Age: 49
End: 2019-12-05

## 2019-12-05 VITALS
OXYGEN SATURATION: 96 % | HEART RATE: 78 BPM | RESPIRATION RATE: 20 BRPM | DIASTOLIC BLOOD PRESSURE: 94 MMHG | SYSTOLIC BLOOD PRESSURE: 164 MMHG | BODY MASS INDEX: 41.75 KG/M2 | WEIGHT: 266 LBS | HEIGHT: 67 IN

## 2019-12-05 DIAGNOSIS — M54.6 CHRONIC RIGHT-SIDED THORACIC BACK PAIN: Primary | ICD-10-CM

## 2019-12-05 DIAGNOSIS — M54.12 CERVICAL RADICULAR PAIN: ICD-10-CM

## 2019-12-05 DIAGNOSIS — G89.29 CHRONIC RIGHT-SIDED THORACIC BACK PAIN: Primary | ICD-10-CM

## 2019-12-05 DIAGNOSIS — G95.9 MYELOPATHY (CMD): ICD-10-CM

## 2019-12-05 DIAGNOSIS — R29.2 HOFFMAN SIGN PRESENT: ICD-10-CM

## 2019-12-05 DIAGNOSIS — C92.10 CHRONIC MYELOID LEUKEMIA (CMD): ICD-10-CM

## 2019-12-05 PROCEDURE — 72072 X-RAY EXAM THORAC SPINE 3VWS: CPT | Performed by: INTERNAL MEDICINE

## 2019-12-05 PROCEDURE — 72040 X-RAY EXAM NECK SPINE 2-3 VW: CPT | Performed by: INTERNAL MEDICINE

## 2019-12-05 PROCEDURE — 99203 OFFICE O/P NEW LOW 30 MIN: CPT | Performed by: PHYSICIAN ASSISTANT

## 2019-12-05 RX ORDER — METHOCARBAMOL 750 MG/1
750 TABLET, FILM COATED ORAL 4 TIMES DAILY PRN
Qty: 30 TABLET | Refills: 0 | Status: SHIPPED | OUTPATIENT
Start: 2019-12-05

## 2019-12-05 RX ORDER — METHYLPREDNISOLONE 4 MG
TABLET, DOSE PACK ORAL
Qty: 21 TABLET | Refills: 0 | Status: SHIPPED | OUTPATIENT
Start: 2019-12-05

## 2019-12-05 ASSESSMENT — ENCOUNTER SYMPTOMS
WEAKNESS: 0
BACK PAIN: 1
NUMBNESS: 0

## 2019-12-10 PROBLEM — E66.813 CLASS 3 SEVERE OBESITY DUE TO EXCESS CALORIES WITH SERIOUS COMORBIDITY AND BODY MASS INDEX (BMI) OF 40.0 TO 44.9 IN ADULT (HCC): Status: ACTIVE | Noted: 2019-12-10

## 2019-12-10 PROBLEM — E66.813 CLASS 3 SEVERE OBESITY DUE TO EXCESS CALORIES WITH SERIOUS COMORBIDITY AND BODY MASS INDEX (BMI) OF 40.0 TO 44.9 IN ADULT: Status: ACTIVE | Noted: 2019-12-10

## 2019-12-10 PROBLEM — E66.01 CLASS 3 SEVERE OBESITY DUE TO EXCESS CALORIES WITH SERIOUS COMORBIDITY AND BODY MASS INDEX (BMI) OF 40.0 TO 44.9 IN ADULT (HCC): Status: ACTIVE | Noted: 2019-12-10

## 2019-12-10 PROBLEM — Z98.84 LAP-BAND SURGERY STATUS: Status: ACTIVE | Noted: 2019-12-10

## 2019-12-12 ENCOUNTER — HOSPITAL (OUTPATIENT)
Dept: OTHER | Age: 49
End: 2019-12-12
Attending: PHYSICIAN ASSISTANT

## 2019-12-12 ENCOUNTER — OFFICE VISIT (OUTPATIENT)
Dept: NEUROSURGERY | Age: 49
End: 2019-12-12

## 2019-12-12 VITALS
HEIGHT: 67 IN | HEART RATE: 77 BPM | WEIGHT: 266 LBS | RESPIRATION RATE: 20 BRPM | BODY MASS INDEX: 41.75 KG/M2 | DIASTOLIC BLOOD PRESSURE: 98 MMHG | SYSTOLIC BLOOD PRESSURE: 170 MMHG | OXYGEN SATURATION: 96 %

## 2019-12-12 DIAGNOSIS — M54.6 CHRONIC RIGHT-SIDED THORACIC BACK PAIN: ICD-10-CM

## 2019-12-12 DIAGNOSIS — M54.12 CERVICAL RADICULAR PAIN: Primary | ICD-10-CM

## 2019-12-12 DIAGNOSIS — G89.29 CHRONIC RIGHT-SIDED THORACIC BACK PAIN: ICD-10-CM

## 2019-12-12 DIAGNOSIS — R29.2 HOFFMAN SIGN PRESENT: ICD-10-CM

## 2019-12-13 ENCOUNTER — TELEPHONE (OUTPATIENT)
Dept: NEUROSURGERY | Age: 49
End: 2019-12-13

## 2019-12-13 DIAGNOSIS — G95.9 MYELOPATHY (CMD): ICD-10-CM

## 2019-12-13 DIAGNOSIS — D49.2 THORACIC SPINE TUMOR: ICD-10-CM

## 2019-12-13 DIAGNOSIS — C92.10 CHRONIC MYELOID LEUKEMIA (CMD): Primary | ICD-10-CM

## 2019-12-17 ENCOUNTER — HOSPITAL (OUTPATIENT)
Dept: OTHER | Age: 49
End: 2019-12-17

## 2019-12-17 ENCOUNTER — HOSPITAL (OUTPATIENT)
Dept: OTHER | Age: 49
End: 2019-12-17
Attending: PHYSICIAN ASSISTANT

## 2019-12-17 LAB
ANALYZER ANC (IANC): ABNORMAL
ANION GAP SERPL CALC-SCNC: 8 MMOL/L (ref 10–20)
BASOPHILS # BLD: 0.1 K/MCL (ref 0–0.3)
BASOPHILS NFR BLD: 1 %
BUN SERPL-MCNC: 15 MG/DL (ref 6–20)
BUN/CREAT SERPL: 17 (ref 7–25)
CALCIUM SERPL-MCNC: 8.1 MG/DL (ref 8.4–10.2)
CHLORIDE SERPL-SCNC: 111 MMOL/L (ref 98–107)
CO2 SERPL-SCNC: 27 MMOL/L (ref 21–32)
CREAT SERPL-MCNC: 0.9 MG/DL (ref 0.67–1.17)
DIFFERENTIAL METHOD BLD: ABNORMAL
EOSINOPHIL # BLD: 0.1 K/MCL (ref 0.1–0.5)
EOSINOPHIL NFR BLD: 1 %
ERYTHROCYTE [DISTWIDTH] IN BLOOD: 14 % (ref 11–15)
GLUCOSE SERPL-MCNC: 116 MG/DL (ref 65–99)
HCT VFR BLD CALC: 38.4 % (ref 39–51)
HGB BLD-MCNC: 12.6 G/DL (ref 13–17)
IMM GRANULOCYTES # BLD AUTO: 0 K/MCL (ref 0–0.2)
IMM GRANULOCYTES NFR BLD: 0 %
LYMPHOCYTES # BLD: 1.3 K/MCL (ref 1–4.8)
LYMPHOCYTES NFR BLD: 13 %
MCH RBC QN AUTO: 29.8 PG (ref 26–34)
MCHC RBC AUTO-ENTMCNC: 32.8 G/DL (ref 32–36.5)
MCV RBC AUTO: 90.8 FL (ref 78–100)
MONOCYTES # BLD: 1.1 K/MCL (ref 0.3–0.9)
MONOCYTES NFR BLD: 11 %
NEUTROPHILS # BLD: 7.6 K/MCL (ref 1.8–7.7)
NEUTROPHILS NFR BLD: 74 %
NEUTS SEG NFR BLD: ABNORMAL %
NRBC (NRBCRE): 0 /100 WBC
PLATELET # BLD: 184 K/MCL (ref 140–450)
POTASSIUM SERPL-SCNC: 3.5 MMOL/L (ref 3.4–5.1)
RBC # BLD: 4.23 MIL/MCL (ref 4.5–5.9)
SODIUM SERPL-SCNC: 142 MMOL/L (ref 135–145)
WBC # BLD: 10.2 K/MCL (ref 4.2–11)

## 2019-12-18 LAB
ALBUMIN SERPL-MCNC: 3.2 G/DL (ref 3.6–5.1)
ALBUMIN/GLOB SERPL: 1 {RATIO} (ref 1–2.4)
ALP SERPL-CCNC: 88 UNITS/L (ref 45–117)
ALT SERPL-CCNC: 26 UNITS/L
ANALYZER ANC (IANC): ABNORMAL
ANION GAP SERPL CALC-SCNC: 7 MMOL/L (ref 10–20)
AST SERPL-CCNC: 14 UNITS/L
BASOPHILS # BLD: 0 K/MCL (ref 0–0.3)
BASOPHILS NFR BLD: 0 %
BILIRUB SERPL-MCNC: 0.7 MG/DL (ref 0.2–1)
BUN SERPL-MCNC: 14 MG/DL (ref 6–20)
BUN/CREAT SERPL: 17 (ref 7–25)
CALCIUM SERPL-MCNC: 8.2 MG/DL (ref 8.4–10.2)
CHLORIDE SERPL-SCNC: 108 MMOL/L (ref 98–107)
CO2 SERPL-SCNC: 30 MMOL/L (ref 21–32)
CREAT SERPL-MCNC: 0.81 MG/DL (ref 0.67–1.17)
DIFFERENTIAL METHOD BLD: ABNORMAL
EOSINOPHIL # BLD: 0.2 K/MCL (ref 0.1–0.5)
EOSINOPHIL NFR BLD: 2 %
ERYTHROCYTE [DISTWIDTH] IN BLOOD: 13.9 % (ref 11–15)
GLOBULIN SER-MCNC: 3.3 G/DL (ref 2–4)
GLUCOSE SERPL-MCNC: 94 MG/DL (ref 65–99)
HCT VFR BLD CALC: 37.2 % (ref 39–51)
HGB BLD-MCNC: 12 G/DL (ref 13–17)
IMM GRANULOCYTES # BLD AUTO: 0 K/MCL (ref 0–0.2)
IMM GRANULOCYTES NFR BLD: 0 %
LYMPHOCYTES # BLD: 1.4 K/MCL (ref 1–4)
LYMPHOCYTES NFR BLD: 14 %
MAGNESIUM SERPL-MCNC: 2.2 MG/DL (ref 1.7–2.4)
MCH RBC QN AUTO: 30 PG (ref 26–34)
MCHC RBC AUTO-ENTMCNC: 32.3 G/DL (ref 32–36.5)
MCV RBC AUTO: 93 FL (ref 78–100)
MONOCYTES # BLD: 1 K/MCL (ref 0.3–0.9)
MONOCYTES NFR BLD: 11 %
NEUTROPHILS # BLD: 6.9 K/MCL (ref 1.8–7.7)
NEUTROPHILS NFR BLD: 73 %
NEUTS SEG NFR BLD: ABNORMAL %
NRBC (NRBCRE): 0 /100 WBC
PLATELET # BLD: 169 K/MCL (ref 140–450)
POTASSIUM SERPL-SCNC: 4 MMOL/L (ref 3.4–5.1)
PROT SERPL-MCNC: 6.5 G/DL (ref 6.4–8.2)
RBC # BLD: 4 MIL/MCL (ref 4.5–5.9)
SODIUM SERPL-SCNC: 141 MMOL/L (ref 135–145)
WBC # BLD: 9.5 K/MCL (ref 4.2–11)

## 2019-12-19 LAB
FOLATE SERPL-MCNC: 8.3 NG/ML
INR PPP: 1.1
INR PPP: NORMAL
M PROTEIN 1 SERPL ELPH-MCNC: ABNORMAL G/L
M PROTEIN 2 SERPL ELPH-MCNC: ABNORMAL G/DL
PATH INTERP SPEC-IMP: ABNORMAL
PROT SERPL-MCNC: 6.3 G/DL (ref 6.4–8.2)
PROTHROMBIN TIME (PRT2): NORMAL
PROTHROMBIN TIME: 11.2 SEC (ref 9.7–11.8)
SPE W IMMUNOFIXATION INTERPRETATION (SPRMXCPRPT): NORMAL
VIT B12 SERPL-MCNC: 383 PG/ML (ref 211–911)

## 2019-12-20 ENCOUNTER — HOSPITAL (OUTPATIENT)
Dept: OTHER | Age: 49
End: 2019-12-20
Attending: PHYSICIAN ASSISTANT

## 2019-12-20 LAB — COPPER SERPL-MCNC: 104 MCG/DL (ref 70–140)

## 2019-12-24 LAB — PATH REPORT, FNA: NORMAL

## 2019-12-27 ENCOUNTER — TELEPHONE (OUTPATIENT)
Dept: NEUROSURGERY | Age: 49
End: 2019-12-27

## 2020-01-02 ENCOUNTER — TELEPHONE (OUTPATIENT)
Dept: NEUROSURGERY | Age: 50
End: 2020-01-02

## 2020-01-09 ENCOUNTER — HOSPITAL (OUTPATIENT)
Dept: OTHER | Age: 50
End: 2020-01-09

## 2020-01-09 LAB
ALBUMIN SERPL-MCNC: 3.3 G/DL (ref 3.6–5.1)
ALBUMIN/GLOB SERPL: 0.9 {RATIO} (ref 1–2.4)
ALP SERPL-CCNC: 101 UNITS/L (ref 45–117)
ALT SERPL-CCNC: 33 UNITS/L
ANALYZER ANC (IANC): ABNORMAL
ANALYZER ANC (IANC): ABNORMAL
ANION GAP SERPL CALC-SCNC: 5 MMOL/L (ref 10–20)
ANION GAP SERPL CALC-SCNC: 6 MMOL/L (ref 10–20)
AST SERPL-CCNC: 21 UNITS/L
AST SERPL-CCNC: ABNORMAL U/L
BASOPHILS # BLD: 0 K/MCL (ref 0–0.3)
BASOPHILS # BLD: 0 K/MCL (ref 0–0.3)
BASOPHILS NFR BLD: 1 %
BASOPHILS NFR BLD: 1 %
BILIRUB SERPL-MCNC: 0.2 MG/DL (ref 0.2–1)
BUN SERPL-MCNC: 15 MG/DL (ref 6–20)
BUN SERPL-MCNC: 18 MG/DL (ref 6–20)
BUN/CREAT SERPL: 22 (ref 7–25)
BUN/CREAT SERPL: 24 (ref 7–25)
CALCIUM SERPL-MCNC: 8.4 MG/DL (ref 8.4–10.2)
CALCIUM SERPL-MCNC: 8.6 MG/DL (ref 8.4–10.2)
CHLORIDE SERPL-SCNC: 110 MMOL/L (ref 98–107)
CHLORIDE SERPL-SCNC: 110 MMOL/L (ref 98–107)
CO2 SERPL-SCNC: 28 MMOL/L (ref 21–32)
CO2 SERPL-SCNC: 28 MMOL/L (ref 21–32)
CREAT SERPL-MCNC: 0.7 MG/DL (ref 0.67–1.17)
CREAT SERPL-MCNC: 0.76 MG/DL (ref 0.67–1.17)
DIFFERENTIAL METHOD BLD: ABNORMAL
DIFFERENTIAL METHOD BLD: ABNORMAL
EOSINOPHIL # BLD: 0.1 K/MCL (ref 0.1–0.5)
EOSINOPHIL # BLD: 0.1 K/MCL (ref 0.1–0.5)
EOSINOPHIL NFR BLD: 2 %
EOSINOPHIL NFR BLD: 2 %
ERYTHROCYTE [DISTWIDTH] IN BLOOD: 13.5 % (ref 11–15)
ERYTHROCYTE [DISTWIDTH] IN BLOOD: 13.5 % (ref 11–15)
GLOBULIN SER-MCNC: 3.6 G/DL (ref 2–4)
GLUCOSE SERPL-MCNC: 119 MG/DL (ref 65–99)
GLUCOSE SERPL-MCNC: 94 MG/DL (ref 65–99)
HCT VFR BLD CALC: 33.4 % (ref 39–51)
HCT VFR BLD CALC: 34.6 % (ref 39–51)
HGB BLD-MCNC: 10.9 G/DL (ref 13–17)
HGB BLD-MCNC: 11.1 G/DL (ref 13–17)
IMM GRANULOCYTES # BLD AUTO: 0 K/MCL (ref 0–0.2)
IMM GRANULOCYTES # BLD AUTO: 0 K/MCL (ref 0–0.2)
IMM GRANULOCYTES NFR BLD: 0 %
IMM GRANULOCYTES NFR BLD: 0 %
LYMPHOCYTES # BLD: 1.2 K/MCL (ref 1–4)
LYMPHOCYTES # BLD: 1.4 K/MCL (ref 1–4)
LYMPHOCYTES NFR BLD: 20 %
LYMPHOCYTES NFR BLD: 22 %
MCH RBC QN AUTO: 29.3 PG (ref 26–34)
MCH RBC QN AUTO: 29.8 PG (ref 26–34)
MCHC RBC AUTO-ENTMCNC: 32.1 G/DL (ref 32–36.5)
MCHC RBC AUTO-ENTMCNC: 32.6 G/DL (ref 32–36.5)
MCV RBC AUTO: 91.3 FL (ref 78–100)
MCV RBC AUTO: 91.3 FL (ref 78–100)
MONOCYTES # BLD: 0.9 K/MCL (ref 0.3–0.9)
MONOCYTES # BLD: 0.9 K/MCL (ref 0.3–0.9)
MONOCYTES NFR BLD: 13 %
MONOCYTES NFR BLD: 17 %
NEUTROPHILS # BLD: 3.2 K/MCL (ref 1.8–7.7)
NEUTROPHILS # BLD: 4.4 K/MCL (ref 1.8–7.7)
NEUTROPHILS NFR BLD: 58 %
NEUTROPHILS NFR BLD: 64 %
NEUTS SEG NFR BLD: ABNORMAL %
NEUTS SEG NFR BLD: ABNORMAL %
NRBC (NRBCRE): 0 /100 WBC
NRBC (NRBCRE): 0 /100 WBC
PLATELET # BLD: 178 K/MCL (ref 140–450)
PLATELET # BLD: 194 K/MCL (ref 140–450)
POTASSIUM SERPL-SCNC: 3.7 MMOL/L (ref 3.4–5.1)
POTASSIUM SERPL-SCNC: 3.9 MMOL/L (ref 3.4–5.1)
POTASSIUM SERPL-SCNC: ABNORMAL MMOL/L
PROT SERPL-MCNC: 6.9 G/DL (ref 6.4–8.2)
RBC # BLD: 3.66 MIL/MCL (ref 4.5–5.9)
RBC # BLD: 3.79 MIL/MCL (ref 4.5–5.9)
SODIUM SERPL-SCNC: 139 MMOL/L (ref 135–145)
SODIUM SERPL-SCNC: 140 MMOL/L (ref 135–145)
WBC # BLD: 5.6 K/MCL (ref 4.2–11)
WBC # BLD: 6.8 K/MCL (ref 4.2–11)

## 2020-01-17 LAB
NORM / CRITICAL FLAG: NORMAL
REF LAB NAME: NORMAL
REF LAB ORDER CODE: NORMAL
REFERENCE RANGE: NORMAL
RESULT (RESF1): NORMAL
TEST NAME: NORMAL
UNIT OF MEASURE: NORMAL

## 2020-01-24 ENCOUNTER — TELEPHONE (OUTPATIENT)
Dept: NEUROSURGERY | Age: 50
End: 2020-01-24

## 2020-10-29 ENCOUNTER — TELEPHONE (OUTPATIENT)
Dept: FAMILY MEDICINE CLINIC | Facility: CLINIC | Age: 50
End: 2020-10-29

## 2020-10-29 ENCOUNTER — OFFICE VISIT (OUTPATIENT)
Dept: FAMILY MEDICINE CLINIC | Facility: CLINIC | Age: 50
End: 2020-10-29
Payer: MEDICAID

## 2020-10-29 VITALS
BODY MASS INDEX: 40.65 KG/M2 | SYSTOLIC BLOOD PRESSURE: 126 MMHG | DIASTOLIC BLOOD PRESSURE: 82 MMHG | WEIGHT: 259 LBS | HEIGHT: 67 IN | TEMPERATURE: 98 F | RESPIRATION RATE: 20 BRPM | HEART RATE: 80 BPM | OXYGEN SATURATION: 98 %

## 2020-10-29 DIAGNOSIS — D22.9 CHANGING NEVUS: Primary | ICD-10-CM

## 2020-10-29 DIAGNOSIS — D22.9 CHANGING NEVUS: ICD-10-CM

## 2020-10-29 DIAGNOSIS — I10 ESSENTIAL HYPERTENSION: ICD-10-CM

## 2020-10-29 DIAGNOSIS — E78.00 PURE HYPERCHOLESTEROLEMIA: ICD-10-CM

## 2020-10-29 DIAGNOSIS — Z00.00 ROUTINE GENERAL MEDICAL EXAMINATION AT A HEALTH CARE FACILITY: Primary | ICD-10-CM

## 2020-10-29 PROCEDURE — 3074F SYST BP LT 130 MM HG: CPT | Performed by: INTERNAL MEDICINE

## 2020-10-29 PROCEDURE — 3008F BODY MASS INDEX DOCD: CPT | Performed by: INTERNAL MEDICINE

## 2020-10-29 PROCEDURE — 99396 PREV VISIT EST AGE 40-64: CPT | Performed by: INTERNAL MEDICINE

## 2020-10-29 PROCEDURE — 3079F DIAST BP 80-89 MM HG: CPT | Performed by: INTERNAL MEDICINE

## 2020-10-29 RX ORDER — NEBIVOLOL 20 MG/1
20 TABLET ORAL
Qty: 90 TABLET | Refills: 3 | Status: SHIPPED | OUTPATIENT
Start: 2020-10-29 | End: 2020-12-17

## 2020-10-29 RX ORDER — DIBASIC SODIUM PHOSPHATE, MONOBASIC POTASSIUM PHOSPHATE AND MONOBASIC SODIUM PHOSPHATE 852; 155; 130 MG/1; MG/1; MG/1
1 TABLET ORAL DAILY
Qty: 90 TABLET | Refills: 3 | Status: SHIPPED | OUTPATIENT
Start: 2020-10-29 | End: 2021-04-05

## 2020-10-29 NOTE — PROGRESS NOTES
Manjeet Paris is a 44year old male new to our practice who presents for a complete physical exam.   HPI:   Pt complains of new mole growing larger on the right side. Hx of skin cancer- 2 sites removed on right body, unsure of the details.    Pt has jessica years   • COLONOSCOPY, POSSIBLE BIOPSY, POSSIBLE POLYPECTOMY 82174 N/A 7/11/2016    Performed by Radha Justice MD at [address] [place] [address]   • LAP GASTRIC BYPASS/LORA-EN-Y     • OTHER SURGICAL HISTORY  2005    Rotator Cuff Repair left side      Family Histor shoulder and flank with keloid type scars from previous skin cancer excisions  HEENT: atraumatic, normocephalic,TMs clear  EYES: PERRLA,conjunctiva clear  NECK: supple,no thyromegaly, no adenopathy  LUNGS: clear to auscultation, easy breathing, no cough  C

## 2020-10-29 NOTE — TELEPHONE ENCOUNTER
Pt cannot get appt with Dr. Napoleon Oneal office until March (with any provider). He's requesting a new referral to [address] [place], [place] [address]on [facility] in [place] on [facility] - he can get an appt next week. He does not have any doctors names with this group.

## 2020-10-29 NOTE — TELEPHONE ENCOUNTER
Referral placed and authorized for   Gera Medina MD [address]  Elham Snowden [phone]     Fred Meyers

## 2020-11-10 ENCOUNTER — LAB ENCOUNTER (OUTPATIENT)
Dept: LAB | Age: 50
End: 2020-11-10
Attending: FAMILY MEDICINE
Payer: MEDICAID

## 2020-11-10 DIAGNOSIS — Z00.00 ROUTINE GENERAL MEDICAL EXAMINATION AT A HEALTH CARE FACILITY: ICD-10-CM

## 2020-11-10 PROCEDURE — 82306 VITAMIN D 25 HYDROXY: CPT

## 2020-11-10 PROCEDURE — 82607 VITAMIN B-12: CPT

## 2020-11-10 PROCEDURE — 85025 COMPLETE CBC W/AUTO DIFF WBC: CPT

## 2020-11-10 PROCEDURE — 83036 HEMOGLOBIN GLYCOSYLATED A1C: CPT

## 2020-11-10 PROCEDURE — 80053 COMPREHEN METABOLIC PANEL: CPT

## 2020-11-10 PROCEDURE — 36415 COLL VENOUS BLD VENIPUNCTURE: CPT

## 2020-11-10 PROCEDURE — 80061 LIPID PANEL: CPT

## 2020-11-10 PROCEDURE — 84443 ASSAY THYROID STIM HORMONE: CPT

## 2020-12-03 ENCOUNTER — TELEPHONE (OUTPATIENT)
Dept: FAMILY MEDICINE CLINIC | Facility: CLINIC | Age: 50
End: 2020-12-03

## 2020-12-03 NOTE — TELEPHONE ENCOUNTER
Ok to take over the counter 50,000 iu Vitamin D once a week. I don't recommend twice a week unless his oncologist says so.

## 2020-12-03 NOTE — TELEPHONE ENCOUNTER
Pt has a script for vit D 50,000 IU and takes two times per week. He wants to know if he can buy OTC. He saw OTC vit D3 50,000 IU and wants to know if he can switch to this?

## 2020-12-03 NOTE — TELEPHONE ENCOUNTER
Called and spoke to patient - instructed him to take 50,000 units once a week. Oncologist did not recommend this - Dr. Ever Horner did.

## 2020-12-16 NOTE — TELEPHONE ENCOUNTER
Approve/deny  Losartan,Atorvastatin and potassium  Last filled by previous MD  Last ov 10/29/2020    Also pt states Bystolic is to expensive. Please advise.

## 2020-12-16 NOTE — TELEPHONE ENCOUNTER
Just started seeing Lula Hammer. Needs refills on medication that still in old doctors name. losartin 100 mg once a day    atordastatin 10 mg once a day    Potasium 20 mg once a day. Also on bystolic. FRANCISCA gave him samples. It is $200. Is there an alternative FRANCISCA can put him on? He can't afford it.     [place]

## 2020-12-17 RX ORDER — ATORVASTATIN CALCIUM 10 MG/1
10 TABLET, FILM COATED ORAL DAILY
Qty: 90 TABLET | Refills: 1 | Status: SHIPPED | OUTPATIENT
Start: 2020-12-17 | End: 2021-08-30

## 2020-12-17 RX ORDER — LOSARTAN POTASSIUM 100 MG/1
100 TABLET ORAL DAILY
Qty: 90 TABLET | Refills: 1 | Status: SHIPPED | OUTPATIENT
Start: 2020-12-17 | End: 2021-07-23

## 2020-12-17 RX ORDER — POTASSIUM CHLORIDE 20 MEQ/1
20 TABLET, EXTENDED RELEASE ORAL DAILY
Qty: 90 TABLET | Refills: 1 | Status: SHIPPED | OUTPATIENT
Start: 2020-12-17 | End: 2021-04-27 | Stop reason: ALTCHOICE

## 2020-12-17 RX ORDER — METOPROLOL TARTRATE 100 MG/1
100 TABLET ORAL 2 TIMES DAILY
Qty: 60 TABLET | Refills: 0 | Status: SHIPPED | OUTPATIENT
Start: 2020-12-17 | End: 2021-03-24

## 2020-12-17 NOTE — TELEPHONE ENCOUNTER
Let pt know the I switched him to the metoprolol 100mg BID, which is the equivalent of bystolic 84QQ. daily. I only sent a month over. He should check his blood pressure at home for the first week (1-2 times a day). Schedule a video visit with me in 2 weeks for BP follow up.

## 2021-01-27 ENCOUNTER — APPOINTMENT (OUTPATIENT)
Dept: CT IMAGING | Facility: HOSPITAL | Age: 51
End: 2021-01-27
Attending: EMERGENCY MEDICINE
Payer: MEDICAID

## 2021-01-27 ENCOUNTER — HOSPITAL ENCOUNTER (EMERGENCY)
Facility: HOSPITAL | Age: 51
Discharge: HOME | End: 2021-01-27
Attending: EMERGENCY MEDICINE
Payer: MEDICAID

## 2021-01-27 VITALS
DIASTOLIC BLOOD PRESSURE: 86 MMHG | WEIGHT: 260 LBS | BODY MASS INDEX: 41 KG/M2 | SYSTOLIC BLOOD PRESSURE: 144 MMHG | RESPIRATION RATE: 18 BRPM | HEART RATE: 85 BPM | OXYGEN SATURATION: 96 % | TEMPERATURE: 98 F

## 2021-01-27 DIAGNOSIS — K57.92 ACUTE DIVERTICULITIS: Primary | ICD-10-CM

## 2021-01-27 LAB
ALBUMIN SERPL-MCNC: 3.7 G/DL (ref 3.4–5)
ALBUMIN/GLOB SERPL: 0.9 {RATIO} (ref 1–2)
ALP LIVER SERPL-CCNC: 124 U/L
ALT SERPL-CCNC: 25 U/L
ANION GAP SERPL CALC-SCNC: 7 MMOL/L (ref 0–18)
AST SERPL-CCNC: 22 U/L (ref 15–37)
BASOPHILS # BLD AUTO: 0.08 X10(3) UL (ref 0–0.2)
BASOPHILS NFR BLD AUTO: 0.9 %
BILIRUB SERPL-MCNC: 0.3 MG/DL (ref 0.1–2)
BILIRUB UR QL STRIP.AUTO: NEGATIVE
BUN BLD-MCNC: 11 MG/DL (ref 7–18)
BUN/CREAT SERPL: 11.5 (ref 10–20)
CALCIUM BLD-MCNC: 8.8 MG/DL (ref 8.5–10.1)
CHLORIDE SERPL-SCNC: 109 MMOL/L (ref 98–112)
CLARITY UR REFRACT.AUTO: CLEAR
CO2 SERPL-SCNC: 23 MMOL/L (ref 21–32)
CREAT BLD-MCNC: 0.96 MG/DL
DEPRECATED RDW RBC AUTO: 41.4 FL (ref 35.1–46.3)
EOSINOPHIL # BLD AUTO: 0.11 X10(3) UL (ref 0–0.7)
EOSINOPHIL NFR BLD AUTO: 1.3 %
ERYTHROCYTE [DISTWIDTH] IN BLOOD BY AUTOMATED COUNT: 12.9 % (ref 11–15)
GLOBULIN PLAS-MCNC: 3.9 G/DL (ref 2.8–4.4)
GLUCOSE BLD-MCNC: 105 MG/DL (ref 70–99)
GLUCOSE UR STRIP.AUTO-MCNC: NEGATIVE MG/DL
HCT VFR BLD AUTO: 41 %
HGB BLD-MCNC: 13.7 G/DL
IMM GRANULOCYTES # BLD AUTO: 0.02 X10(3) UL (ref 0–1)
IMM GRANULOCYTES NFR BLD: 0.2 %
KETONES UR STRIP.AUTO-MCNC: NEGATIVE MG/DL
LEUKOCYTE ESTERASE UR QL STRIP.AUTO: NEGATIVE
LIPASE SERPL-CCNC: 77 U/L (ref 73–393)
LYMPHOCYTES # BLD AUTO: 1.67 X10(3) UL (ref 1–4)
LYMPHOCYTES NFR BLD AUTO: 19 %
M PROTEIN MFR SERPL ELPH: 7.6 G/DL (ref 6.4–8.2)
MCH RBC QN AUTO: 29.5 PG (ref 26–34)
MCHC RBC AUTO-ENTMCNC: 33.4 G/DL (ref 31–37)
MCV RBC AUTO: 88.4 FL
MONOCYTES # BLD AUTO: 0.9 X10(3) UL (ref 0.1–1)
MONOCYTES NFR BLD AUTO: 10.2 %
NEUTROPHILS # BLD AUTO: 6.02 X10 (3) UL (ref 1.5–7.7)
NEUTROPHILS # BLD AUTO: 6.02 X10(3) UL (ref 1.5–7.7)
NEUTROPHILS NFR BLD AUTO: 68.4 %
NITRITE UR QL STRIP.AUTO: NEGATIVE
OSMOLALITY SERPL CALC.SUM OF ELEC: 288 MOSM/KG (ref 275–295)
PH UR STRIP.AUTO: 6 [PH] (ref 4.5–8)
PLATELET # BLD AUTO: 193 10(3)UL (ref 150–450)
POTASSIUM SERPL-SCNC: 3.7 MMOL/L (ref 3.5–5.1)
PROT UR STRIP.AUTO-MCNC: NEGATIVE MG/DL
RBC # BLD AUTO: 4.64 X10(6)UL
RBC UR QL AUTO: NEGATIVE
SODIUM SERPL-SCNC: 139 MMOL/L (ref 136–145)
SP GR UR STRIP.AUTO: 1 (ref 1–1.03)
TROPONIN I SERPL-MCNC: <0.045 NG/ML (ref ?–0.04)
UROBILINOGEN UR STRIP.AUTO-MCNC: <2 MG/DL
WBC # BLD AUTO: 8.8 X10(3) UL (ref 4–11)

## 2021-01-27 PROCEDURE — 96365 THER/PROPH/DIAG IV INF INIT: CPT

## 2021-01-27 PROCEDURE — 85025 COMPLETE CBC W/AUTO DIFF WBC: CPT | Performed by: EMERGENCY MEDICINE

## 2021-01-27 PROCEDURE — 80053 COMPREHEN METABOLIC PANEL: CPT | Performed by: EMERGENCY MEDICINE

## 2021-01-27 PROCEDURE — 99284 EMERGENCY DEPT VISIT MOD MDM: CPT

## 2021-01-27 PROCEDURE — 83690 ASSAY OF LIPASE: CPT | Performed by: EMERGENCY MEDICINE

## 2021-01-27 PROCEDURE — 74176 CT ABD & PELVIS W/O CONTRAST: CPT | Performed by: EMERGENCY MEDICINE

## 2021-01-27 PROCEDURE — 96375 TX/PRO/DX INJ NEW DRUG ADDON: CPT

## 2021-01-27 PROCEDURE — 96361 HYDRATE IV INFUSION ADD-ON: CPT

## 2021-01-27 PROCEDURE — S0030 INJECTION, METRONIDAZOLE: HCPCS

## 2021-01-27 PROCEDURE — 81003 URINALYSIS AUTO W/O SCOPE: CPT | Performed by: EMERGENCY MEDICINE

## 2021-01-27 PROCEDURE — 84484 ASSAY OF TROPONIN QUANT: CPT | Performed by: EMERGENCY MEDICINE

## 2021-01-27 RX ORDER — METRONIDAZOLE 500 MG/100ML
500 INJECTION, SOLUTION INTRAVENOUS ONCE
Status: COMPLETED | OUTPATIENT
Start: 2021-01-27 | End: 2021-01-27

## 2021-01-27 RX ORDER — SODIUM CHLORIDE 9 MG/ML
125 INJECTION, SOLUTION INTRAVENOUS CONTINUOUS
Status: DISCONTINUED | OUTPATIENT
Start: 2021-01-27 | End: 2021-01-27

## 2021-01-27 RX ORDER — CIPROFLOXACIN 500 MG/5ML
500 KIT ORAL 2 TIMES DAILY
Qty: 100 ML | Refills: 0 | Status: SHIPPED | OUTPATIENT
Start: 2021-01-27 | End: 2021-02-06

## 2021-01-27 RX ORDER — METRONIDAZOLE 500 MG/100ML
INJECTION, SOLUTION INTRAVENOUS
Status: COMPLETED
Start: 2021-01-27 | End: 2021-01-27

## 2021-01-27 RX ORDER — KETOROLAC TROMETHAMINE 30 MG/ML
15 INJECTION, SOLUTION INTRAMUSCULAR; INTRAVENOUS ONCE
Status: COMPLETED | OUTPATIENT
Start: 2021-01-27 | End: 2021-01-27

## 2021-01-27 RX ORDER — CIPROFLOXACIN 500 MG/5ML
500 KIT ORAL 2 TIMES DAILY
Qty: 100 ML | Refills: 0 | Status: SHIPPED | OUTPATIENT
Start: 2021-01-27 | End: 2021-01-27

## 2021-01-27 NOTE — ED INITIAL ASSESSMENT (HPI)
50YM c/c of abd pain Pt state that he having LLQ pain since Friday Pt state the pain has gotten worse tonight.  Pt denies NVD or urinary problems

## 2021-01-27 NOTE — ED PROVIDER NOTES
Patient Seen in: [facility] Emergency Department      History   Patient presents with:  Abdomen/Flank Pain    Stated Complaint: LLQ pain    HPI/Subjective:   HPI    Weston Karen is a pleasant 49-year-old male presenting with left lower quadrant pain.   For cyanosis or edema no rash back exam is normal skin was nondiaphoretic       ED Course     Labs Reviewed   COMP METABOLIC PANEL (14) - Abnormal; Notable for the following components:       Result Value    Glucose 105 (*)     Alkaline Phosphatase 124 (*)

## 2021-01-28 ENCOUNTER — TELEPHONE (OUTPATIENT)
Dept: CASE MANAGEMENT | Facility: HOSPITAL | Age: 51
End: 2021-01-28

## 2021-02-26 ENCOUNTER — TELEMEDICINE (OUTPATIENT)
Dept: FAMILY MEDICINE CLINIC | Facility: CLINIC | Age: 51
End: 2021-02-26
Payer: MEDICAID

## 2021-02-26 DIAGNOSIS — H92.01 RIGHT EAR PAIN: ICD-10-CM

## 2021-02-26 DIAGNOSIS — J01.00 ACUTE NON-RECURRENT MAXILLARY SINUSITIS: Primary | ICD-10-CM

## 2021-02-26 PROCEDURE — 99213 OFFICE O/P EST LOW 20 MIN: CPT | Performed by: PHYSICIAN ASSISTANT

## 2021-02-26 RX ORDER — AMOXICILLIN AND CLAVULANATE POTASSIUM 875; 125 MG/1; MG/1
1 TABLET, FILM COATED ORAL 2 TIMES DAILY
Qty: 20 TABLET | Refills: 0 | Status: SHIPPED | OUTPATIENT
Start: 2021-02-26 | End: 2021-03-08

## 2021-02-26 NOTE — PROGRESS NOTES
Video Visit    Emanuel Gorman is a 44year old male. No chief complaint on file. HPI:   Patient with history of leukemia and chronic/recurrent ear infections presents today complaining of sinus symptoms.   For the last week he has had ear pain and pr BIOPSY, POSSIBLE POLYPECTOMY 10302;  Surgeon: Radha Noe MD;  Location: [facility]   • Colonoscopy,remv lesn,snare N/A 7/11/2016    Procedure: COLONOSCOPY, POSSIBLE BIOPSY, POSSIBLE POLYPECTOMY 38801;  Surgeon: Radha Noe MD;  Location: understanding of these issues and agrees to the plan. No follow-ups on file.       Anisa Hernandez PA-C  2/26/2021  12:32 PM    Telehealth Verbal Consent   I conducted a telehealth visit with Orin Duong today, 02/26/21, which was completed using two-

## 2021-03-24 RX ORDER — METOPROLOL TARTRATE 100 MG/1
TABLET ORAL
Qty: 60 TABLET | Refills: 0 | Status: SHIPPED | OUTPATIENT
Start: 2021-03-24 | End: 2021-04-27

## 2021-04-05 ENCOUNTER — OFFICE VISIT (OUTPATIENT)
Dept: FAMILY MEDICINE CLINIC | Facility: CLINIC | Age: 51
End: 2021-04-05
Payer: MEDICAID

## 2021-04-05 ENCOUNTER — TELEPHONE (OUTPATIENT)
Dept: FAMILY MEDICINE CLINIC | Facility: CLINIC | Age: 51
End: 2021-04-05

## 2021-04-05 VITALS
RESPIRATION RATE: 20 BRPM | OXYGEN SATURATION: 97 % | DIASTOLIC BLOOD PRESSURE: 98 MMHG | SYSTOLIC BLOOD PRESSURE: 140 MMHG | HEIGHT: 67 IN | BODY MASS INDEX: 41.91 KG/M2 | TEMPERATURE: 98 F | WEIGHT: 267 LBS | HEART RATE: 76 BPM

## 2021-04-05 DIAGNOSIS — I10 ESSENTIAL HYPERTENSION: ICD-10-CM

## 2021-04-05 DIAGNOSIS — K63.5 POLYP OF COLON, UNSPECIFIED PART OF COLON, UNSPECIFIED TYPE: Primary | ICD-10-CM

## 2021-04-05 DIAGNOSIS — R19.4 BOWEL HABIT CHANGES: ICD-10-CM

## 2021-04-05 PROBLEM — J01.90 ACUTE SINUSITIS: Status: RESOLVED | Noted: 2018-01-08 | Resolved: 2021-04-05

## 2021-04-05 PROCEDURE — 3080F DIAST BP >= 90 MM HG: CPT | Performed by: INTERNAL MEDICINE

## 2021-04-05 PROCEDURE — 99214 OFFICE O/P EST MOD 30 MIN: CPT | Performed by: INTERNAL MEDICINE

## 2021-04-05 PROCEDURE — 3077F SYST BP >= 140 MM HG: CPT | Performed by: INTERNAL MEDICINE

## 2021-04-05 PROCEDURE — 3008F BODY MASS INDEX DOCD: CPT | Performed by: INTERNAL MEDICINE

## 2021-04-05 NOTE — TELEPHONE ENCOUNTER
PT SAW MAYRA TODAY SHE GAVE HIM A REFERRAL FOR A COLONOSCOPY TO [facility]    THEY DO NOT TAKE HIS INSURANCE     PLEASE ADVISE

## 2021-04-05 NOTE — TELEPHONE ENCOUNTER
Pt's ins. Advised him he can see Dr. Loan Chavarria with [facility] - [address] [facility] , [address] in [place].   Pt would like a call to advise when he can sched appt

## 2021-04-05 NOTE — TELEPHONE ENCOUNTER
Called pt and informed him to contact his insurance to find out which GI he can see. Pt agrees and he will let us know.

## 2021-04-05 NOTE — PROGRESS NOTES
Samira Hemphill is a 44year old male. HPI:   Here with change in bowel habits since January. Seen in the ER, CT showed diverticulitis. Treated with Abx and felt better but stools and bowel habits are not back to 'normal'.   Hx of colon polyps and due for soft stools   : denies dysuria, frequency or incontinence  NEURO: denies headaches, denies dizziness; denies numbness or tingling    EXAM:   BP (!) 140/98   Pulse 76   Temp 97.8 °F (36.6 °C) (Other)   Resp 20   Ht 5' 7\" (1.702 m)   Wt 267 lb (121.1 kg)

## 2021-04-07 ENCOUNTER — TELEPHONE (OUTPATIENT)
Dept: FAMILY MEDICINE CLINIC | Facility: CLINIC | Age: 51
End: 2021-04-07

## 2021-04-07 DIAGNOSIS — R19.4 BOWEL HABIT CHANGES: ICD-10-CM

## 2021-04-07 DIAGNOSIS — K63.5 POLYP OF COLON, UNSPECIFIED PART OF COLON, UNSPECIFIED TYPE: Primary | ICD-10-CM

## 2021-04-07 NOTE — TELEPHONE ENCOUNTER
He called his insurance and got a name of someone for the referral for colonoscopy. They can't see him until August.  What should he do?

## 2021-04-08 NOTE — TELEPHONE ENCOUNTER
If [facility] doesn't take his ALFONZO, he can try [address] [facility] [address] specialists.   Otherwise

## 2021-04-08 NOTE — TELEPHONE ENCOUNTER
Patient would like order faxed to:  [facility] and said to thank Meg and Chiquita

## 2021-04-08 NOTE — TELEPHONE ENCOUNTER
He can try [address] [facility] on [address] If they don't take ALFONZO, he'll have to see the person they suggested. It's ok to wait until August. If symptoms worsen he can use the ER.

## 2021-04-27 RX ORDER — METOPROLOL TARTRATE 100 MG/1
TABLET ORAL
Qty: 60 TABLET | Refills: 0 | Status: SHIPPED | OUTPATIENT
Start: 2021-04-27 | End: 2021-07-23

## 2021-05-14 ENCOUNTER — LAB ENCOUNTER (OUTPATIENT)
Dept: LAB | Facility: HOSPITAL | Age: 51
End: 2021-05-14
Attending: INTERNAL MEDICINE
Payer: MEDICAID

## 2021-05-14 DIAGNOSIS — Z86.010 HISTORY OF COLON POLYPS: ICD-10-CM

## 2021-05-16 ENCOUNTER — ANESTHESIA EVENT (OUTPATIENT)
Dept: ENDOSCOPY | Facility: HOSPITAL | Age: 51
End: 2021-05-16
Payer: MEDICAID

## 2021-05-17 ENCOUNTER — ANESTHESIA (OUTPATIENT)
Dept: ENDOSCOPY | Facility: HOSPITAL | Age: 51
End: 2021-05-17
Payer: MEDICAID

## 2021-05-17 ENCOUNTER — HOSPITAL ENCOUNTER (OUTPATIENT)
Facility: HOSPITAL | Age: 51
Setting detail: HOSPITAL OUTPATIENT SURGERY
Discharge: HOME | End: 2021-05-17
Attending: INTERNAL MEDICINE | Admitting: INTERNAL MEDICINE
Payer: MEDICAID

## 2021-05-17 VITALS
HEIGHT: 67 IN | RESPIRATION RATE: 20 BRPM | SYSTOLIC BLOOD PRESSURE: 130 MMHG | OXYGEN SATURATION: 98 % | WEIGHT: 260 LBS | TEMPERATURE: 98 F | HEART RATE: 72 BPM | DIASTOLIC BLOOD PRESSURE: 86 MMHG | BODY MASS INDEX: 40.81 KG/M2

## 2021-05-17 DIAGNOSIS — Z86.010 HISTORY OF COLON POLYPS: Primary | ICD-10-CM

## 2021-05-17 PROCEDURE — 0DJD8ZZ INSPECTION OF LOWER INTESTINAL TRACT, VIA NATURAL OR ARTIFICIAL OPENING ENDOSCOPIC: ICD-10-PCS | Performed by: INTERNAL MEDICINE

## 2021-05-17 RX ORDER — SODIUM CHLORIDE, SODIUM LACTATE, POTASSIUM CHLORIDE, CALCIUM CHLORIDE 600; 310; 30; 20 MG/100ML; MG/100ML; MG/100ML; MG/100ML
INJECTION, SOLUTION INTRAVENOUS CONTINUOUS
Status: DISCONTINUED | OUTPATIENT
Start: 2021-05-17 | End: 2021-05-17

## 2021-05-17 NOTE — ANESTHESIA POSTPROCEDURE EVALUATION
[facility] Corinthia Sly Patient Status:  Hospital Outpatient Surgery   Age/Gender 44year old male MRN [id number]   Location [address] [place] [address] Attending Loan Joseph MD   Hosp Day # 0 PCP Maya Tamayo DO       Anesthesi

## 2021-05-17 NOTE — ANESTHESIA PREPROCEDURE EVALUATION
PRE-OP EVALUATION    Patient Name: Reno Orourke    Admit Diagnosis: History of colon polyps [Z86.010]    Pre-op Diagnosis: History of colon polyps [Z86.010]    COLONOSCOPY    Anesthesia Procedure: COLONOSCOPY (N/A )    Surgeon(s) and Role:     * Darrion Chloe Ceja MD;  Location: [facility]   • COLONOSCOPY,REMV LESN,SNARE N/A 7/11/2016    Procedure: COLONOSCOPY, POSSIBLE BIOPSY, POSSIBLE POLYPECTOMY 96844;  Surgeon: Chloe Ceja MD;  Location: [facility]   • LAP GASTRIC BYPASS/LORA-EN

## 2021-05-17 NOTE — H&P
Warden Guzmán presents for endoscopy. States prep is clear    States no other diverticulitis events     Risk of diverticulitis in setting of cscope discussed.  Role of bowel regimen and fiber for diverticulosis/diverticulitis discused     Role of surg Hugh Clark MD;  Location: [facility]   • LAP GASTRIC BYPASS/LORA-EN-Y     • OTHER SURGICAL HISTORY  2005    Rotator Cuff Repair left side         ROS:  As above. No ha, visual/hearing changes, soar throat.   Denies cp, sob, cough, hematuria, jean

## 2021-05-17 NOTE — BRIEF OP NOTE
Pre-Operative Diagnosis: History of colon polyps [Z86.010]     Post-Operative Diagnosis: diverticulosis      Procedure Performed:   COLONOSCOPY    Surgeon(s) and Role:     * Best Lorenzo MD - Primary    Assistant(s):        Surgical Findings: see op

## 2021-05-17 NOTE — OPERATIVE REPORT
ENDOSCOPY OPERATIVE REPORT    Patient Name:  Bonny Baxter  Medical Record #: [id number]  YOB: 1970  Date of Procedure: 5/17/2021    Preoperative Diagnosis:  Hx of polyp.  diverticulitis    Postoperative Diagnosis: diverticulosis     Proced stable. Specimens:  See above      Condition on discharge from procedure:  good      PLAN:  Patient is to follow a high fiber, low fat diet and followup with primary physician for routine care.     repeat colonoscopy with MAC in 3 years, plan 2 day p

## 2021-05-17 NOTE — PROGRESS NOTES
Lab test for COVID 19 is negative     Sherron Lainez MD  [address] [place] [address] Gastroenterology

## 2021-07-18 ENCOUNTER — HOSPITAL ENCOUNTER (OUTPATIENT)
Age: 51
Discharge: HOME | End: 2021-07-18
Attending: EMERGENCY MEDICINE
Payer: MEDICAID

## 2021-07-18 ENCOUNTER — APPOINTMENT (OUTPATIENT)
Dept: CT IMAGING | Age: 51
End: 2021-07-18
Attending: NURSE PRACTITIONER
Payer: MEDICAID

## 2021-07-18 VITALS
BODY MASS INDEX: 40.81 KG/M2 | DIASTOLIC BLOOD PRESSURE: 101 MMHG | HEART RATE: 75 BPM | OXYGEN SATURATION: 96 % | RESPIRATION RATE: 18 BRPM | HEIGHT: 67 IN | TEMPERATURE: 98 F | WEIGHT: 260 LBS | SYSTOLIC BLOOD PRESSURE: 167 MMHG

## 2021-07-18 DIAGNOSIS — J01.00 ACUTE NON-RECURRENT MAXILLARY SINUSITIS: Primary | ICD-10-CM

## 2021-07-18 LAB
#MXD IC: 1.3 X10ˆ3/UL (ref 0.1–1)
CREAT BLD-MCNC: 0.8 MG/DL
GLUCOSE BLD-MCNC: 112 MG/DL (ref 70–99)
HCT VFR BLD AUTO: 40.6 %
HGB BLD-MCNC: 13.6 G/DL
ISTAT BUN: 16 MG/DL (ref 7–18)
ISTAT CHLORIDE: 107 MMOL/L (ref 98–112)
ISTAT HEMATOCRIT: 41 %
ISTAT IONIZED CALCIUM FOR CHEM 8: 1.21 MMOL/L (ref 1.12–1.32)
ISTAT POTASSIUM: 4.3 MMOL/L (ref 3.6–5.1)
ISTAT SODIUM: 142 MMOL/L (ref 136–145)
ISTAT TCO2: 23 MMOL/L (ref 21–32)
LYMPHOCYTES # BLD AUTO: 1.3 X10ˆ3/UL (ref 1–4)
LYMPHOCYTES NFR BLD AUTO: 16.4 %
MCH RBC QN AUTO: 30.6 PG (ref 26–34)
MCHC RBC AUTO-ENTMCNC: 33.5 G/DL (ref 31–37)
MCV RBC AUTO: 91.4 FL (ref 80–100)
MIXED CELL %: 15.3 %
NEUTROPHILS # BLD AUTO: 5.6 X10ˆ3/UL (ref 1.5–7.7)
NEUTROPHILS NFR BLD AUTO: 68.3 %
PLATELET # BLD AUTO: 199 X10ˆ3/UL (ref 150–450)
POCT BILIRUBIN URINE: NEGATIVE
POCT BLOOD URINE: NEGATIVE
POCT GLUCOSE URINE: NEGATIVE MG/DL
POCT KETONE URINE: NEGATIVE MG/DL
POCT LEUKOCYTE ESTERASE URINE: NEGATIVE
POCT MONO: NEGATIVE
POCT NITRITE URINE: NEGATIVE
POCT PH URINE: 5.5 (ref 5–8)
POCT PROTEIN URINE: NEGATIVE MG/DL
POCT SPECIFIC GRAVITY URINE: 1.03
POCT URINE COLOR: YELLOW
POCT UROBILINOGEN URINE: 0.2 MG/DL
RBC # BLD AUTO: 4.44 X10ˆ6/UL
S PYO AG THROAT QL: NEGATIVE
SARS-COV-2 RNA RESP QL NAA+PROBE: NOT DETECTED
TROPONIN I BLD-MCNC: <0.02 NG/ML
WBC # BLD AUTO: 8.2 X10ˆ3/UL (ref 4–11)

## 2021-07-18 PROCEDURE — 80047 BASIC METABLC PNL IONIZED CA: CPT

## 2021-07-18 PROCEDURE — 99214 OFFICE O/P EST MOD 30 MIN: CPT

## 2021-07-18 PROCEDURE — 85025 COMPLETE CBC W/AUTO DIFF WBC: CPT | Performed by: NURSE PRACTITIONER

## 2021-07-18 PROCEDURE — 84484 ASSAY OF TROPONIN QUANT: CPT

## 2021-07-18 PROCEDURE — 81002 URINALYSIS NONAUTO W/O SCOPE: CPT | Performed by: NURSE PRACTITIONER

## 2021-07-18 PROCEDURE — 87880 STREP A ASSAY W/OPTIC: CPT

## 2021-07-18 PROCEDURE — 74177 CT ABD & PELVIS W/CONTRAST: CPT | Performed by: NURSE PRACTITIONER

## 2021-07-18 PROCEDURE — 36415 COLL VENOUS BLD VENIPUNCTURE: CPT

## 2021-07-18 PROCEDURE — 86308 HETEROPHILE ANTIBODY SCREEN: CPT | Performed by: NURSE PRACTITIONER

## 2021-07-18 RX ORDER — AMOXICILLIN AND CLAVULANATE POTASSIUM 875; 125 MG/1; MG/1
1 TABLET, FILM COATED ORAL 2 TIMES DAILY
Qty: 20 TABLET | Refills: 0 | Status: SHIPPED | OUTPATIENT
Start: 2021-07-18 | End: 2021-07-23

## 2021-07-18 NOTE — ED PROVIDER NOTES
Patient Seen in: [facility] [place]      History   Patient presents with:  Cough/URI    Stated Complaint: jes ear ache and sore throat x 13 days    HPI/Subjective:   HPI    Patient presents to the urgent care with report of being had a sore throa throat pain, denies difficulty swallowing, drooling  Neck: Denies neck pain or trauma  Respiratory: Denies sob, denies coughing  Cardiovascular: Denies chest pain, lightheadedness or dizziness  GI: Denies abdominal pain, nausea/vomiting/diarrhea.     Extrem area 10th intercostal space anterior and posterior with palpation, no mass, nondistended. No rebound or guarding. Bowel sounds present x4 normoactive. No suprapubic tenderness with palpation. Extremities: No cyanosis, clubbing, or edema.   Positive pain (Reviewed)  ------------------------------------------------------------  Time: 07/18 1000  Value: POCT urinalysis dipstick(!)  Comment: (Reviewed)  ------------------------------------------------------------  Time: 07/18 1112  Value: CT ABDOMEN+PELVIS(CO reduction techniques were used. Dose information is transmitted to the ACR [employer] of Radiology) NRDR ([address]) which includes the Dose Index Registry.   PATIENT STATED HISTORY:(As transcribed by Technologist)  Patient sta status post lap band surgery.     Dictated by (CST): Carlos Manuel Ashraf MD on 7/18/2021 at 10:53 AM     Finalized by (CST): Carlos Manuel Ashraf MD on 7/18/2021 at 11:00 AM         Patient reports he is aware of the diverticulosis, patient home with diagnosis

## 2021-07-18 NOTE — ED INITIAL ASSESSMENT (HPI)
For the past 13 days, c/o right ear pain, left ear feels clogged and a swollen throat. Denies fevers/cough.

## 2021-07-23 ENCOUNTER — OFFICE VISIT (OUTPATIENT)
Dept: FAMILY MEDICINE CLINIC | Facility: CLINIC | Age: 51
End: 2021-07-23
Payer: MEDICAID

## 2021-07-23 ENCOUNTER — HOSPITAL ENCOUNTER (OUTPATIENT)
Facility: HOSPITAL | Age: 51
Setting detail: OBSERVATION
Discharge: HOME | End: 2021-07-24
Attending: EMERGENCY MEDICINE | Admitting: HOSPITALIST
Payer: MEDICAID

## 2021-07-23 ENCOUNTER — APPOINTMENT (OUTPATIENT)
Dept: GENERAL RADIOLOGY | Facility: HOSPITAL | Age: 51
End: 2021-07-23
Attending: EMERGENCY MEDICINE
Payer: MEDICAID

## 2021-07-23 VITALS
BODY MASS INDEX: 42.69 KG/M2 | OXYGEN SATURATION: 98 % | SYSTOLIC BLOOD PRESSURE: 156 MMHG | DIASTOLIC BLOOD PRESSURE: 96 MMHG | HEART RATE: 93 BPM | WEIGHT: 272 LBS | HEIGHT: 67 IN | RESPIRATION RATE: 16 BRPM

## 2021-07-23 DIAGNOSIS — I51.89 DIASTOLIC DYSFUNCTION: ICD-10-CM

## 2021-07-23 DIAGNOSIS — R06.02 SOB (SHORTNESS OF BREATH): ICD-10-CM

## 2021-07-23 DIAGNOSIS — I10 ESSENTIAL HYPERTENSION: ICD-10-CM

## 2021-07-23 DIAGNOSIS — Z12.11 SCREENING FOR COLON CANCER: ICD-10-CM

## 2021-07-23 DIAGNOSIS — R07.9 CHEST PAIN, RULE OUT ACUTE MYOCARDIAL INFARCTION: Primary | ICD-10-CM

## 2021-07-23 DIAGNOSIS — I10 ESSENTIAL HYPERTENSION: Primary | ICD-10-CM

## 2021-07-23 DIAGNOSIS — G47.33 OSA (OBSTRUCTIVE SLEEP APNEA): ICD-10-CM

## 2021-07-23 DIAGNOSIS — R53.83 OTHER FATIGUE: ICD-10-CM

## 2021-07-23 LAB
ALBUMIN SERPL-MCNC: 3.3 G/DL (ref 3.4–5)
ALBUMIN/GLOB SERPL: 0.9 {RATIO} (ref 1–2)
ALP LIVER SERPL-CCNC: 108 U/L
ALT SERPL-CCNC: 26 U/L
ANION GAP SERPL CALC-SCNC: 3 MMOL/L (ref 0–18)
AST SERPL-CCNC: 19 U/L (ref 15–37)
BASOPHILS # BLD AUTO: 0.04 X10(3) UL (ref 0–0.2)
BASOPHILS NFR BLD AUTO: 0.5 %
BILIRUB SERPL-MCNC: 0.2 MG/DL (ref 0.1–2)
BUN BLD-MCNC: 17 MG/DL (ref 7–18)
BUN/CREAT SERPL: 19.8 (ref 10–20)
CALCIUM BLD-MCNC: 8.1 MG/DL (ref 8.5–10.1)
CHLORIDE SERPL-SCNC: 111 MMOL/L (ref 98–112)
CO2 SERPL-SCNC: 26 MMOL/L (ref 21–32)
CREAT BLD-MCNC: 0.86 MG/DL
D-DIMER: 0.41 UG/ML FEU (ref ?–0.52)
DEPRECATED RDW RBC AUTO: 43.2 FL (ref 35.1–46.3)
EOSINOPHIL # BLD AUTO: 0.38 X10(3) UL (ref 0–0.7)
EOSINOPHIL NFR BLD AUTO: 4.4 %
ERYTHROCYTE [DISTWIDTH] IN BLOOD BY AUTOMATED COUNT: 13.1 % (ref 11–15)
GLOBULIN PLAS-MCNC: 3.8 G/DL (ref 2.8–4.4)
GLUCOSE BLD-MCNC: 93 MG/DL (ref 70–99)
HCT VFR BLD AUTO: 39 %
HGB BLD-MCNC: 13.4 G/DL
IMM GRANULOCYTES # BLD AUTO: 0.03 X10(3) UL (ref 0–1)
IMM GRANULOCYTES NFR BLD: 0.3 %
LYMPHOCYTES # BLD AUTO: 1.4 X10(3) UL (ref 1–4)
LYMPHOCYTES NFR BLD AUTO: 16.1 %
M PROTEIN MFR SERPL ELPH: 7.1 G/DL (ref 6.4–8.2)
MCH RBC QN AUTO: 30.9 PG (ref 26–34)
MCHC RBC AUTO-ENTMCNC: 34.4 G/DL (ref 31–37)
MCV RBC AUTO: 90.1 FL
MONOCYTES # BLD AUTO: 1 X10(3) UL (ref 0.1–1)
MONOCYTES NFR BLD AUTO: 11.5 %
NEUTROPHILS # BLD AUTO: 5.86 X10 (3) UL (ref 1.5–7.7)
NEUTROPHILS # BLD AUTO: 5.86 X10(3) UL (ref 1.5–7.7)
NEUTROPHILS NFR BLD AUTO: 67.2 %
NT-PROBNP SERPL-MCNC: 27 PG/ML (ref ?–125)
OSMOLALITY SERPL CALC.SUM OF ELEC: 291 MOSM/KG (ref 275–295)
PLATELET # BLD AUTO: 166 10(3)UL (ref 150–450)
POTASSIUM SERPL-SCNC: 3.9 MMOL/L (ref 3.5–5.1)
RBC # BLD AUTO: 4.33 X10(6)UL
SARS-COV-2 RNA RESP QL NAA+PROBE: NOT DETECTED
SODIUM SERPL-SCNC: 140 MMOL/L (ref 136–145)
TROPONIN I SERPL-MCNC: <0.045 NG/ML (ref ?–0.04)
TROPONIN I SERPL-MCNC: <0.045 NG/ML (ref ?–0.04)
WBC # BLD AUTO: 8.7 X10(3) UL (ref 4–11)

## 2021-07-23 PROCEDURE — 71046 X-RAY EXAM CHEST 2 VIEWS: CPT | Performed by: EMERGENCY MEDICINE

## 2021-07-23 PROCEDURE — 3077F SYST BP >= 140 MM HG: CPT | Performed by: FAMILY MEDICINE

## 2021-07-23 PROCEDURE — 99214 OFFICE O/P EST MOD 30 MIN: CPT | Performed by: FAMILY MEDICINE

## 2021-07-23 PROCEDURE — 3079F DIAST BP 80-89 MM HG: CPT | Performed by: HOSPITALIST

## 2021-07-23 PROCEDURE — 3075F SYST BP GE 130 - 139MM HG: CPT | Performed by: HOSPITALIST

## 2021-07-23 PROCEDURE — 3008F BODY MASS INDEX DOCD: CPT | Performed by: HOSPITALIST

## 2021-07-23 PROCEDURE — 3008F BODY MASS INDEX DOCD: CPT | Performed by: FAMILY MEDICINE

## 2021-07-23 PROCEDURE — 99220 INITIAL OBSERVATION CARE,LEVL III: CPT | Performed by: HOSPITALIST

## 2021-07-23 PROCEDURE — 3080F DIAST BP >= 90 MM HG: CPT | Performed by: FAMILY MEDICINE

## 2021-07-23 RX ORDER — MELATONIN
1000 DAILY
Status: DISCONTINUED | OUTPATIENT
Start: 2021-07-24 | End: 2021-07-24

## 2021-07-23 RX ORDER — ASCORBIC ACID 500 MG
1000 TABLET ORAL DAILY
Status: DISCONTINUED | OUTPATIENT
Start: 2021-07-24 | End: 2021-07-24

## 2021-07-23 RX ORDER — PROCHLORPERAZINE EDISYLATE 5 MG/ML
5 INJECTION INTRAMUSCULAR; INTRAVENOUS EVERY 8 HOURS PRN
Status: DISCONTINUED | OUTPATIENT
Start: 2021-07-23 | End: 2021-07-24

## 2021-07-23 RX ORDER — MELATONIN
1000 DAILY
COMMUNITY

## 2021-07-23 RX ORDER — ENOXAPARIN SODIUM 100 MG/ML
0.5 INJECTION SUBCUTANEOUS DAILY
Status: DISCONTINUED | OUTPATIENT
Start: 2021-07-24 | End: 2021-07-24

## 2021-07-23 RX ORDER — ONDANSETRON 2 MG/ML
4 INJECTION INTRAMUSCULAR; INTRAVENOUS EVERY 6 HOURS PRN
Status: DISCONTINUED | OUTPATIENT
Start: 2021-07-23 | End: 2021-07-24

## 2021-07-23 RX ORDER — CYCLOBENZAPRINE HCL 10 MG
10 TABLET ORAL 2 TIMES DAILY PRN
Status: DISCONTINUED | OUTPATIENT
Start: 2021-07-23 | End: 2021-07-24

## 2021-07-23 RX ORDER — ACETAMINOPHEN 325 MG/1
650 TABLET ORAL EVERY 6 HOURS PRN
Status: DISCONTINUED | OUTPATIENT
Start: 2021-07-23 | End: 2021-07-24

## 2021-07-23 RX ORDER — AMOXICILLIN AND CLAVULANATE POTASSIUM 875; 125 MG/1; MG/1
1 TABLET, FILM COATED ORAL 2 TIMES DAILY
COMMUNITY
Start: 2021-07-19 | End: 2021-07-28

## 2021-07-23 RX ORDER — CHOLECALCIFEROL (VITAMIN D3) 1250 MCG
1 CAPSULE ORAL WEEKLY
COMMUNITY

## 2021-07-23 RX ORDER — BENZONATATE 100 MG/1
100 CAPSULE ORAL 3 TIMES DAILY PRN
Status: DISCONTINUED | OUTPATIENT
Start: 2021-07-23 | End: 2021-07-24

## 2021-07-23 RX ORDER — AMOXICILLIN AND CLAVULANATE POTASSIUM 875; 125 MG/1; MG/1
1 TABLET, FILM COATED ORAL 2 TIMES DAILY
Status: DISCONTINUED | OUTPATIENT
Start: 2021-07-23 | End: 2021-07-24

## 2021-07-23 RX ORDER — ASPIRIN 325 MG
325 TABLET ORAL DAILY
Status: DISCONTINUED | OUTPATIENT
Start: 2021-07-23 | End: 2021-07-24

## 2021-07-23 RX ORDER — ATORVASTATIN CALCIUM 10 MG/1
10 TABLET, FILM COATED ORAL DAILY
Status: DISCONTINUED | OUTPATIENT
Start: 2021-07-24 | End: 2021-07-24

## 2021-07-23 RX ORDER — SPIRONOLACTONE 50 MG/1
50 TABLET, FILM COATED ORAL DAILY
Qty: 30 TABLET | Refills: 0 | Status: SHIPPED | OUTPATIENT
Start: 2021-07-23 | End: 2021-08-26

## 2021-07-23 RX ORDER — NITROGLYCERIN 0.4 MG/1
0.4 TABLET SUBLINGUAL EVERY 5 MIN PRN
Status: DISCONTINUED | OUTPATIENT
Start: 2021-07-23 | End: 2021-07-24

## 2021-07-23 RX ORDER — LOSARTAN POTASSIUM 100 MG/1
100 TABLET ORAL DAILY
Status: DISCONTINUED | OUTPATIENT
Start: 2021-07-24 | End: 2021-07-24

## 2021-07-23 RX ORDER — METOPROLOL TARTRATE 100 MG/1
100 TABLET ORAL 2 TIMES DAILY
Qty: 60 TABLET | Refills: 0 | Status: ON HOLD | OUTPATIENT
Start: 2021-07-23 | End: 2021-07-24

## 2021-07-23 RX ORDER — LOSARTAN POTASSIUM 100 MG/1
100 TABLET ORAL DAILY
Qty: 90 TABLET | Refills: 1 | Status: SHIPPED | OUTPATIENT
Start: 2021-07-23

## 2021-07-23 NOTE — PROGRESS NOTES
HPI:   Alexey Morales is a 44year old male who presents to discuss HTN     Pt has h/o leukemia - under treatment at [facility] with Dr. Esther Lima     HTN was formerly well controlled on the bystolic and losartan   Pt c/o sob and HA's   Pt denies chest darling • Other (COPD [Other]) Father    • Heart Attack Father    • Stroke Father    • Breast Cancer Mother    • Diabetes Mother       Social History:   Social History    Tobacco Use      Smoking status: Former Smoker        Packs/day: 0.50        Quit date: 8/1 tablet (100 mg total) by mouth daily. Dispense: 90 tablet; Refill: 1  - spironolactone 50 MG Oral Tab; Take 1 tablet (50 mg total) by mouth daily. Dispense: 30 tablet; Refill: 0  - CARD ECHO STRESS ECHO W CONTRAST (CPT=93350/86610); Future    2.  [address]

## 2021-07-23 NOTE — ED INITIAL ASSESSMENT (HPI)
Patient sts SOB (few hours) and headache (few weeks), dizzy and legs feeling tingling. was seen at [facility] today and was told to go to the ER if symptoms got worse.

## 2021-07-23 NOTE — ED PROVIDER NOTES
Patient Seen in: [facility] Emergency Department      History   Patient presents with:  Difficulty Breathing  Hypertension    Stated Complaint: htn, amanda, feels weakness on left side - equal grasp strength normal sensation    HPI/Subjective:   HPI Use      Smoking status: Former Smoker        Packs/day: 0.50        Quit date: 8/10/2018        Years since quittin.9      Smokeless tobacco: Never Used      Tobacco comment: 1/2 pack per day    Vaping Use      Vaping Use: Never used    Alcohol use:  [place] NATRIURETIC PEPTIDE - Normal   RAPID SARS-COV-2 BY PCR - Normal   CBC WITH DIFFERENTIAL WITH PLATELET    Narrative: The following orders were created for panel order CBC With Differential With Platelet.   Procedure                               Abnormal encounter diagnosis)     Disposition:  Admit  7/23/2021  8:21 pm    Follow-up:  No follow-up provider specified.         Medications Prescribed:  Current Discharge Medication List                          Hospital Problems     Present on Admission  Date Rev

## 2021-07-24 ENCOUNTER — APPOINTMENT (OUTPATIENT)
Dept: CV DIAGNOSTICS | Facility: HOSPITAL | Age: 51
End: 2021-07-24
Attending: HOSPITALIST
Payer: MEDICAID

## 2021-07-24 ENCOUNTER — APPOINTMENT (OUTPATIENT)
Dept: CV DIAGNOSTICS | Facility: HOSPITAL | Age: 51
End: 2021-07-24
Attending: INTERNAL MEDICINE
Payer: MEDICAID

## 2021-07-24 VITALS
TEMPERATURE: 98 F | DIASTOLIC BLOOD PRESSURE: 86 MMHG | HEART RATE: 69 BPM | HEIGHT: 67 IN | SYSTOLIC BLOOD PRESSURE: 132 MMHG | RESPIRATION RATE: 22 BRPM | BODY MASS INDEX: 42.53 KG/M2 | OXYGEN SATURATION: 98 % | WEIGHT: 271 LBS

## 2021-07-24 LAB
ALBUMIN SERPL-MCNC: 3.2 G/DL (ref 3.4–5)
ALBUMIN/GLOB SERPL: 0.9 {RATIO} (ref 1–2)
ALP LIVER SERPL-CCNC: 83 U/L
ALT SERPL-CCNC: 25 U/L
ANION GAP SERPL CALC-SCNC: 5 MMOL/L (ref 0–18)
AST SERPL-CCNC: 14 U/L (ref 15–37)
ATRIAL RATE: 87 BPM
BASOPHILS # BLD AUTO: 0.07 X10(3) UL (ref 0–0.2)
BASOPHILS NFR BLD AUTO: 1 %
BILIRUB SERPL-MCNC: 0.4 MG/DL (ref 0.1–2)
BUN BLD-MCNC: 17 MG/DL (ref 7–18)
BUN/CREAT SERPL: 22.4 (ref 10–20)
CALCIUM BLD-MCNC: 7.9 MG/DL (ref 8.5–10.1)
CHLORIDE SERPL-SCNC: 110 MMOL/L (ref 98–112)
CHOLEST SMN-MCNC: 132 MG/DL (ref ?–200)
CO2 SERPL-SCNC: 25 MMOL/L (ref 21–32)
CREAT BLD-MCNC: 0.76 MG/DL
DEPRECATED RDW RBC AUTO: 44.6 FL (ref 35.1–46.3)
EOSINOPHIL # BLD AUTO: 0.51 X10(3) UL (ref 0–0.7)
EOSINOPHIL NFR BLD AUTO: 7.4 %
ERYTHROCYTE [DISTWIDTH] IN BLOOD BY AUTOMATED COUNT: 13.2 % (ref 11–15)
GLOBULIN PLAS-MCNC: 3.5 G/DL (ref 2.8–4.4)
GLUCOSE BLD-MCNC: 94 MG/DL (ref 70–99)
HCT VFR BLD AUTO: 39.4 %
HDLC SERPL-MCNC: 46 MG/DL (ref 40–59)
HGB BLD-MCNC: 12.9 G/DL
IMM GRANULOCYTES # BLD AUTO: 0.02 X10(3) UL (ref 0–1)
IMM GRANULOCYTES NFR BLD: 0.3 %
LDLC SERPL CALC-MCNC: 60 MG/DL (ref ?–100)
LYMPHOCYTES # BLD AUTO: 1.63 X10(3) UL (ref 1–4)
LYMPHOCYTES NFR BLD AUTO: 23.8 %
M PROTEIN MFR SERPL ELPH: 6.7 G/DL (ref 6.4–8.2)
MCH RBC QN AUTO: 30.2 PG (ref 26–34)
MCHC RBC AUTO-ENTMCNC: 32.7 G/DL (ref 31–37)
MCV RBC AUTO: 92.3 FL
MONOCYTES # BLD AUTO: 0.89 X10(3) UL (ref 0.1–1)
MONOCYTES NFR BLD AUTO: 13 %
NEUTROPHILS # BLD AUTO: 3.73 X10 (3) UL (ref 1.5–7.7)
NEUTROPHILS # BLD AUTO: 3.73 X10(3) UL (ref 1.5–7.7)
NEUTROPHILS NFR BLD AUTO: 54.5 %
NONHDLC SERPL-MCNC: 86 MG/DL (ref ?–130)
OSMOLALITY SERPL CALC.SUM OF ELEC: 291 MOSM/KG (ref 275–295)
P AXIS: 25 DEGREES
P-R INTERVAL: 162 MS
PLATELET # BLD AUTO: 167 10(3)UL (ref 150–450)
POTASSIUM SERPL-SCNC: 4.2 MMOL/L (ref 3.5–5.1)
Q-T INTERVAL: 386 MS
QRS DURATION: 94 MS
QTC CALCULATION (BEZET): 464 MS
R AXIS: 33 DEGREES
RBC # BLD AUTO: 4.27 X10(6)UL
SODIUM SERPL-SCNC: 140 MMOL/L (ref 136–145)
T AXIS: -9 DEGREES
TRIGL SERPL-MCNC: 149 MG/DL (ref 30–149)
TROPONIN I SERPL-MCNC: <0.045 NG/ML (ref ?–0.04)
VENTRICULAR RATE: 87 BPM
VLDLC SERPL CALC-MCNC: 22 MG/DL (ref 0–30)
WBC # BLD AUTO: 6.9 X10(3) UL (ref 4–11)

## 2021-07-24 PROCEDURE — 93306 TTE W/DOPPLER COMPLETE: CPT | Performed by: HOSPITALIST

## 2021-07-24 PROCEDURE — 99217 OBSERVATION CARE DISCHARGE: CPT | Performed by: HOSPITALIST

## 2021-07-24 PROCEDURE — 93018 CV STRESS TEST I&R ONLY: CPT | Performed by: FAMILY MEDICINE

## 2021-07-24 PROCEDURE — 93350 STRESS TTE ONLY: CPT | Performed by: FAMILY MEDICINE

## 2021-07-24 PROCEDURE — 93017 CV STRESS TEST TRACING ONLY: CPT | Performed by: FAMILY MEDICINE

## 2021-07-24 RX ORDER — DIAZEPAM 10 MG/1
5 TABLET ORAL ONCE
Status: COMPLETED | OUTPATIENT
Start: 2021-07-24 | End: 2021-07-24

## 2021-07-24 RX ORDER — LABETALOL 100 MG/1
200 TABLET, FILM COATED ORAL EVERY 12 HOURS SCHEDULED
Status: DISCONTINUED | OUTPATIENT
Start: 2021-07-24 | End: 2021-07-24

## 2021-07-24 RX ORDER — LABETALOL 200 MG/1
200 TABLET, FILM COATED ORAL 2 TIMES DAILY
Qty: 60 TABLET | Refills: 3 | Status: SHIPPED | OUTPATIENT
Start: 2021-07-24

## 2021-07-24 NOTE — DISCHARGE SUMMARY
[facility] [place] HOSPITALIST  DISCHARGE SUMMARY     Otilia York Patient Status:  Observation    1970 MRN [id number]   [facility] 3NE-A Attending Heavenly Freed MD   Hosp Day # 0 PCP Maya Tamayo DO     Date of Admission: 2021  Date o · none    Consultants:  • [facility] cards    Discharge Medication List:     Discharge Medications      START taking these medications      Instructions Prescription details   Labetalol HCl 200 MG Tabs  Commonly known as: NORMODYNE      Take 1 tablet (200 mg to weeks. Office will call you early this week with an appt    Appointments for Next 30 Days 7/24/2021 - 8/23/2021    None          Vital signs:  Temp:  [97.7 °F (36.5 °C)-98.3 °F (36.8 °C)] 97.7 °F (36.5 °C)  Pulse:  [58-85] 69  Resp:  [16-22] 22  BP: (125-1

## 2021-07-24 NOTE — PROGRESS NOTES
[facility] Pharmacy Note:  Anticoagulation Weight Dose Adjustment for enoxaparin    Joshua Pendleton is a 44year old patient who has been prescribed enoxaparin 40 mg subcutaneous every 24 hours. Estimated Creatinine Clearance: 93.9 mL/min (based on SCr of 0.

## 2021-07-24 NOTE — CONSULTS
[facility]  Cardiology Consultation    Nicole Contreras Patient Status:  Observation    1970 MRN [id number]   [facility] 3NE-A Attending Anita Christianson MD   Hosp Day # 0 PCP Maegan Perez DO     Reason for Consultation:  Chest pa Facility-Administered Medications:   •  Labetalol HCl (NORMODYNE) tab 200 mg, 200 mg, Oral, 2 times per day  •  Amoxicillin-Pot Clavulanate (AUGMENTIN) 875-125 MG tab 1 tablet, 1 tablet, Oral, BID  •  ascorbic acid (VITAMIN C) tab 1,000 mg, 1,000 mg, Oral, S3.  Lungs: Clear without wheezes, rales, rhonchi or dullness. Normal excursions and effort. Abdomen: Soft, non-tender. Extremities: Without clubbing, cyanosis or edema. Peripheral pulses are 2+. Neurologic: Alert and oriented, normal affect.   Skin:

## 2021-07-24 NOTE — PROGRESS NOTES
[facility] HOSPITALIST  Progress Note     Shahana Nelson Patient Status:  Observation    1970 MRN [id number]   [facility] 3NE-A Attending Reza Carlos MD   Hosp Day # 0 PCP Mal Herrera DO     Chief Complaint: chest pain    S: Jessica Stone COVID19 Not Detected 07/18/2021    COVID19 Not Detected 05/14/2021       Pro-Calcitonin  No results for input(s): PCT in the last 168 hours.     Cardiac  Recent Labs   Lab 07/23/21  1704 07/23/21  2155 07/24/21  0651   TROP <0.045 <0.045 <0.045   PBNP 27

## 2021-07-24 NOTE — H&P
[facility] HOSPITAL[facility]  History and Physical     Sheila Cronin Patient Status:  Emergency    1970 MRN [id number]   Location [address] Attending Filipe Williamson MD   Hosp Day # 0 PCP Margot Reasons, DO     Chief Complaint: GASTRIC BYPASS/LORA-EN-Y     • OTHER SURGICAL HISTORY  2005    Rotator Cuff Repair left side       Social History:  reports that he quit smoking about 2 years ago. He smoked 0.50 packs per day.  He has never used smokeless tobacco. He reports current alcoho atraumatic. Respiratory: Clear to auscultation bilaterally. Cardiovascular: S1, S2. Regular rate and rhythm. Chest and Back: Tenderness to left scapula, reproducible   Abdomen: Soft, nontender, nondistended. Positive bowel sounds.  No rebound, guarding needed  7. Leukemia  1. Family to bring in chemo  8. Gastric bypass     Quality:  · DVT Prophylaxis: Lovenox  · Jamil: No  · If COVID testing is negative, may discontinue isolation: Yes     Plan of care discussed with patient, ER and RN.      Darius Blanco

## 2021-07-24 NOTE — PROGRESS NOTES
NURSING ADMISSION NOTE      Patient admitted via Cart  Oriented to room 3602. Safety precautions initiated. Bed in low position. Call light in reach. Admission navigator completed.  Reports tightness in his back, neck, and chest. PRN flexeril give

## 2021-07-24 NOTE — PROGRESS NOTES
CARDIACDIAGNOSTICS NOTE          Inpatient stress echo ordered by [facility] Cardiologist Dr. Leonor Webb. Resting vitals 140/92, 76, 4/10 baseline chest pain.  The patient walked 7:00 on standard Francisco Javier protocol, stopping due to severe dyspnea and fatigu

## 2021-08-26 ENCOUNTER — OFFICE VISIT (OUTPATIENT)
Dept: FAMILY MEDICINE CLINIC | Facility: CLINIC | Age: 51
End: 2021-08-26
Payer: MEDICAID

## 2021-08-26 VITALS
DIASTOLIC BLOOD PRESSURE: 78 MMHG | HEART RATE: 84 BPM | BODY MASS INDEX: 42.22 KG/M2 | WEIGHT: 269 LBS | SYSTOLIC BLOOD PRESSURE: 134 MMHG | RESPIRATION RATE: 16 BRPM | HEIGHT: 67 IN | OXYGEN SATURATION: 98 %

## 2021-08-26 DIAGNOSIS — I10 ESSENTIAL HYPERTENSION: Primary | ICD-10-CM

## 2021-08-26 DIAGNOSIS — G47.33 OSA (OBSTRUCTIVE SLEEP APNEA): ICD-10-CM

## 2021-08-26 PROCEDURE — 99213 OFFICE O/P EST LOW 20 MIN: CPT | Performed by: FAMILY MEDICINE

## 2021-08-26 PROCEDURE — 3008F BODY MASS INDEX DOCD: CPT | Performed by: FAMILY MEDICINE

## 2021-08-26 PROCEDURE — 3078F DIAST BP <80 MM HG: CPT | Performed by: FAMILY MEDICINE

## 2021-08-26 PROCEDURE — 3075F SYST BP GE 130 - 139MM HG: CPT | Performed by: FAMILY MEDICINE

## 2021-08-26 RX ORDER — SPIRONOLACTONE 50 MG/1
50 TABLET, FILM COATED ORAL DAILY
Qty: 90 TABLET | Refills: 1 | Status: SHIPPED | OUTPATIENT
Start: 2021-08-26

## 2021-08-26 NOTE — PROGRESS NOTES
HPI:   Shahana Nelson is a 44year old male who presents to discuss HTN and ER follow up    Pt has h/o leukemia - under treatment at [facility] with Dr. Meera Vick was seen in the ER the day following last OV for chest pain and sob   Blood pressure w mouth 2 (two) times daily. 60 tablet 3   • losartan 100 MG Oral Tab Take 1 tablet (100 mg total) by mouth daily. 90 tablet 1   • spironolactone 50 MG Oral Tab Take 1 tablet (50 mg total) by mouth daily.  30 tablet 0   • Vitamin B-12 1000 MCG Oral Tab Take 1 month    Drug use: Yes      Types: Cannabis      Comment: medical marijuana card         REVIEW OF SYSTEMS:   GENERAL: feels well otherwise  SKIN: denies any unusual skin lesions  EYES:denies blurred vision or double vision  HEENT: denies nasal congestion,

## 2021-08-30 RX ORDER — ATORVASTATIN CALCIUM 10 MG/1
TABLET, FILM COATED ORAL
Qty: 90 TABLET | Refills: 1 | Status: SHIPPED | OUTPATIENT
Start: 2021-08-30

## 2021-08-31 ENCOUNTER — APPOINTMENT (OUTPATIENT)
Dept: GENERAL RADIOLOGY | Age: 51
End: 2021-08-31
Attending: PHYSICIAN ASSISTANT
Payer: MEDICAID

## 2021-08-31 ENCOUNTER — HOSPITAL ENCOUNTER (EMERGENCY)
Age: 51
Discharge: HOME | End: 2021-08-31
Payer: MEDICAID

## 2021-08-31 ENCOUNTER — APPOINTMENT (OUTPATIENT)
Dept: ULTRASOUND IMAGING | Age: 51
End: 2021-08-31
Attending: PHYSICIAN ASSISTANT
Payer: MEDICAID

## 2021-08-31 VITALS
SYSTOLIC BLOOD PRESSURE: 105 MMHG | OXYGEN SATURATION: 97 % | RESPIRATION RATE: 18 BRPM | HEIGHT: 67 IN | DIASTOLIC BLOOD PRESSURE: 63 MMHG | BODY MASS INDEX: 41.59 KG/M2 | HEART RATE: 82 BPM | WEIGHT: 265 LBS | TEMPERATURE: 99 F

## 2021-08-31 DIAGNOSIS — M25.561 ACUTE PAIN OF RIGHT KNEE: Primary | ICD-10-CM

## 2021-08-31 PROCEDURE — 99284 EMERGENCY DEPT VISIT MOD MDM: CPT

## 2021-08-31 PROCEDURE — 93971 EXTREMITY STUDY: CPT | Performed by: PHYSICIAN ASSISTANT

## 2021-08-31 PROCEDURE — 73562 X-RAY EXAM OF KNEE 3: CPT | Performed by: PHYSICIAN ASSISTANT

## 2021-08-31 RX ORDER — DICLOFENAC SODIUM 75 MG/1
75 TABLET, DELAYED RELEASE ORAL 2 TIMES DAILY
Qty: 28 TABLET | Refills: 0 | Status: SHIPPED | OUTPATIENT
Start: 2021-08-31 | End: 2021-11-23 | Stop reason: ALTCHOICE

## 2021-08-31 NOTE — ED INITIAL ASSESSMENT (HPI)
Pt states hes been trying to get more exercise and walking his dog started having r knee pain worse behind his knee. Difficult to bend his knee.

## 2021-08-31 NOTE — ED PROVIDER NOTES
LOV 3/13/17. Last lipid panel was done 3/7/17. Refilled per written protocol in Dr. Jose monge. Patient Seen in: [facility] In [place]      History   Patient presents with:  Knee Pain    Stated Complaint: right knee pain for a couple weeks     HPI/Subjective:   HPI    57-year-old male presents to the ED for right knee pain has b Respiratory: Negative for shortness of breath. Cardiovascular: Negative for chest pain. Gastrointestinal: Negative for nausea and vomiting. Genitourinary: Negative for dysuria. Musculoskeletal: Negative for neck stiffness.         Right knee pain Skin:     General: Skin is warm. Capillary Refill: Capillary refill takes less than 2 seconds. Neurological:      Mental Status: He is alert and oriented to person, place, and time. Sensory: No sensory deficit.    Psychiatric:         Mood and Disp-28 tablet, R-0

## 2021-09-13 ENCOUNTER — TELEMEDICINE (OUTPATIENT)
Dept: FAMILY MEDICINE CLINIC | Facility: CLINIC | Age: 51
End: 2021-09-13

## 2021-09-13 DIAGNOSIS — R09.81 NASAL CONGESTION: ICD-10-CM

## 2021-09-13 DIAGNOSIS — H92.02 LEFT EAR PAIN: Primary | ICD-10-CM

## 2021-09-13 PROCEDURE — 99213 OFFICE O/P EST LOW 20 MIN: CPT | Performed by: FAMILY MEDICINE

## 2021-09-13 RX ORDER — AMOXICILLIN AND CLAVULANATE POTASSIUM 875; 125 MG/1; MG/1
1 TABLET, FILM COATED ORAL 2 TIMES DAILY
Qty: 20 TABLET | Refills: 0 | Status: SHIPPED | OUTPATIENT
Start: 2021-09-13 | End: 2021-09-23

## 2021-09-13 RX ORDER — FLUTICASONE PROPIONATE 50 MCG
2 SPRAY, SUSPENSION (ML) NASAL NIGHTLY
Qty: 1 EACH | Refills: 0 | Status: SHIPPED | OUTPATIENT
Start: 2021-09-13 | End: 2021-11-23 | Stop reason: ALTCHOICE

## 2021-09-13 NOTE — PROGRESS NOTES
This visit is conducted using Telemedicine with live, interactive video and audio.     Telehealth outside of [address] Verbal Consent   I conducted a telehealth visit with Emanuel Gorman today, 09/13/21, which was completed using two-way, real-ti and social history reviewed    Exam   alert, appears stated age and cooperative, Normocephalic, without obvious abnormality, atraumatic, lips, mucosa, and tongue normal; teeth and gums normal, Speaking in full sentences comfortably, Normal work of breathin follow the below recommendations. If you test positive for COVID-19, you should notify your family and friends with whom you have had close contact recently (starting 2 days before you first had symptoms). What counts as close contact?     * You were w from other public places. If you must go out, avoid using any kind of public transportation, ridesharing, or taxis. 2. Monitor your symptoms carefully. If your symptoms get worse, call your healthcare provider immediately. 3. Get rest and stay hydrated. isolation precautions following the below guidelines:  • At least 24 hours have passed since recovery defined as resolution of fever without the use of fever-reducing medications; and  · Improvement in respiratory symptoms (e.g., cough, shortness of breath the virus. How can I donate convalescent plasma? The process for donating plasma is very similar to donating blood.  Christina Alvarado (a large blood research institute in [address] [place] [place] & [facility] and one of [facility]s blood product suppliers) is coordinating plasma experience one or more of the following symptoms:    Persistent severe fatigue Brain fog or trouble concentrating   Headaches Sleeping difficulties   Anxiety or depression Loss of taste or smell   Chest pain  Palpitations Light headedness  Muscle Pain   In

## 2021-09-29 ENCOUNTER — TELEPHONE (OUTPATIENT)
Dept: ADMINISTRATIVE | Age: 51
End: 2021-09-29

## 2021-09-29 DIAGNOSIS — H92.03 EAR PAIN, BILATERAL: Primary | ICD-10-CM

## 2021-09-29 NOTE — TELEPHONE ENCOUNTER
Patient call and Request a new referral to see ENT [facility] ,For Bilateral Ear Pain Please Review Plan and care and Sign If You Agree. Thank you.        Manuel Greco   [facility]  Central Referrals

## 2021-10-14 ENCOUNTER — LAB ENCOUNTER (OUTPATIENT)
Dept: LAB | Age: 51
End: 2021-10-14
Attending: FAMILY MEDICINE
Payer: MEDICAID

## 2021-10-14 DIAGNOSIS — R53.83 OTHER FATIGUE: ICD-10-CM

## 2021-10-14 DIAGNOSIS — Z12.11 SCREENING FOR COLON CANCER: ICD-10-CM

## 2021-10-14 DIAGNOSIS — I51.89 DIASTOLIC DYSFUNCTION: ICD-10-CM

## 2021-10-14 DIAGNOSIS — I10 ESSENTIAL HYPERTENSION: ICD-10-CM

## 2021-10-14 DIAGNOSIS — R06.02 SOB (SHORTNESS OF BREATH): ICD-10-CM

## 2021-10-14 PROCEDURE — 85025 COMPLETE CBC W/AUTO DIFF WBC: CPT

## 2021-10-14 PROCEDURE — 84439 ASSAY OF FREE THYROXINE: CPT

## 2021-10-14 PROCEDURE — 82306 VITAMIN D 25 HYDROXY: CPT

## 2021-10-14 PROCEDURE — 80053 COMPREHEN METABOLIC PANEL: CPT

## 2021-10-14 PROCEDURE — 84443 ASSAY THYROID STIM HORMONE: CPT

## 2021-10-14 PROCEDURE — 36415 COLL VENOUS BLD VENIPUNCTURE: CPT

## 2021-10-14 PROCEDURE — 82607 VITAMIN B-12: CPT

## 2021-11-23 ENCOUNTER — OFFICE VISIT (OUTPATIENT)
Dept: FAMILY MEDICINE CLINIC | Facility: CLINIC | Age: 51
End: 2021-11-23
Payer: MEDICAID

## 2021-11-23 VITALS
BODY MASS INDEX: 41.75 KG/M2 | SYSTOLIC BLOOD PRESSURE: 126 MMHG | HEART RATE: 83 BPM | OXYGEN SATURATION: 98 % | DIASTOLIC BLOOD PRESSURE: 82 MMHG | RESPIRATION RATE: 16 BRPM | WEIGHT: 266 LBS | HEIGHT: 67 IN

## 2021-11-23 DIAGNOSIS — Z85.820 HISTORY OF MELANOMA: ICD-10-CM

## 2021-11-23 DIAGNOSIS — Z85.6 HISTORY OF LEUKEMIA: ICD-10-CM

## 2021-11-23 DIAGNOSIS — Z00.00 ANNUAL PHYSICAL EXAM: Primary | ICD-10-CM

## 2021-11-23 PROCEDURE — 3079F DIAST BP 80-89 MM HG: CPT | Performed by: FAMILY MEDICINE

## 2021-11-23 PROCEDURE — 3008F BODY MASS INDEX DOCD: CPT | Performed by: FAMILY MEDICINE

## 2021-11-23 PROCEDURE — 3074F SYST BP LT 130 MM HG: CPT | Performed by: FAMILY MEDICINE

## 2021-11-23 PROCEDURE — 99396 PREV VISIT EST AGE 40-64: CPT | Performed by: FAMILY MEDICINE

## 2021-11-23 NOTE — PROGRESS NOTES
Tristian Pulido is a 44year old male who presents for a complete physical exam.   HPI:   Pt complains of none.   + vit D b12 C   No urinary symptoms  No erectile dysfunction  No penile discharge  + snoring / never went for sleep study   No family h/o prost 10/14/2021 26   07/24/2021 25   07/23/2021 26   11/09/2019 21   10/22/2018 37   08/01/2014 44   12/30/2013 29   08/30/2013 36      Current Outpatient Medications   Medication Sig Dispense Refill   • ATORVASTATIN 10 MG Oral Tab TAKE 1 TABLET(10 MG) BY CARLO Smoker        Packs/day: 0.50        Quit date: 8/10/2018        Years since quitting: 3.2      Smokeless tobacco: Never Used      Tobacco comment: 1/2 pack per day    Vaping Use      Vaping Use: Never used    Alcohol use: Yes      Comment: once per month intact    ASSESSMENT AND PLAN:   Chetna Canas is a 44year old male who presents for a complete physical exam.     1. Annual physical exam  cpm     2. History of leukemia  See oncology     3.  History of melanoma  See derm     Discussed diet, exercise, c

## 2022-02-09 ENCOUNTER — TELEPHONE (OUTPATIENT)
Dept: ADMINISTRATIVE | Age: 52
End: 2022-02-09

## 2022-02-09 NOTE — TELEPHONE ENCOUNTER
Patient called and requested a new referral to see Dermatology,Please review and sign plan and care if you agree Thank you. Mary Grace Spivey V      [facility] REFERRALS.

## 2022-05-09 ENCOUNTER — OFFICE VISIT (OUTPATIENT)
Dept: FAMILY MEDICINE CLINIC | Facility: CLINIC | Age: 52
End: 2022-05-09
Payer: MEDICAID

## 2022-05-09 VITALS
HEIGHT: 67 IN | BODY MASS INDEX: 40.81 KG/M2 | HEART RATE: 68 BPM | SYSTOLIC BLOOD PRESSURE: 140 MMHG | DIASTOLIC BLOOD PRESSURE: 82 MMHG | WEIGHT: 260 LBS | TEMPERATURE: 98 F | OXYGEN SATURATION: 99 % | RESPIRATION RATE: 20 BRPM

## 2022-05-09 DIAGNOSIS — J01.00 ACUTE NON-RECURRENT MAXILLARY SINUSITIS: Primary | ICD-10-CM

## 2022-05-09 PROCEDURE — 99213 OFFICE O/P EST LOW 20 MIN: CPT | Performed by: NURSE PRACTITIONER

## 2022-05-09 PROCEDURE — 3008F BODY MASS INDEX DOCD: CPT | Performed by: NURSE PRACTITIONER

## 2022-05-09 PROCEDURE — 3077F SYST BP >= 140 MM HG: CPT | Performed by: NURSE PRACTITIONER

## 2022-05-09 PROCEDURE — 3079F DIAST BP 80-89 MM HG: CPT | Performed by: NURSE PRACTITIONER

## 2022-05-09 RX ORDER — AMOXICILLIN 875 MG/1
875 TABLET, COATED ORAL 2 TIMES DAILY
Qty: 20 TABLET | Refills: 0 | Status: SHIPPED | OUTPATIENT
Start: 2022-05-09 | End: 2022-05-19

## 2022-06-07 ENCOUNTER — HOSPITAL ENCOUNTER (OUTPATIENT)
Age: 52
Discharge: HOME | End: 2022-06-07
Attending: EMERGENCY MEDICINE
Payer: MEDICAID

## 2022-06-07 VITALS
HEIGHT: 67 IN | BODY MASS INDEX: 40.81 KG/M2 | HEART RATE: 82 BPM | DIASTOLIC BLOOD PRESSURE: 90 MMHG | RESPIRATION RATE: 21 BRPM | WEIGHT: 260 LBS | SYSTOLIC BLOOD PRESSURE: 147 MMHG | TEMPERATURE: 98 F | OXYGEN SATURATION: 97 %

## 2022-06-07 DIAGNOSIS — J01.00 ACUTE MAXILLARY SINUSITIS, RECURRENCE NOT SPECIFIED: Primary | ICD-10-CM

## 2022-06-07 LAB — SARS-COV-2 RNA RESP QL NAA+PROBE: NOT DETECTED

## 2022-06-07 PROCEDURE — 99213 OFFICE O/P EST LOW 20 MIN: CPT

## 2022-06-07 RX ORDER — METHYLPREDNISOLONE 4 MG/1
TABLET ORAL
Qty: 21 TABLET | Refills: 0 | Status: SHIPPED | OUTPATIENT
Start: 2022-06-07

## 2022-06-07 RX ORDER — LEVOFLOXACIN 500 MG/1
500 TABLET, FILM COATED ORAL DAILY
Qty: 10 TABLET | Refills: 0 | Status: SHIPPED | OUTPATIENT
Start: 2022-06-07 | End: 2022-06-17

## 2022-06-07 NOTE — ED INITIAL ASSESSMENT (HPI)
Pt c/o sinus infection and bilateral ear pressure, Pt hx ear issues and wears hearing aids, however, cannot wear hearing aids due to pressure  Pt r/t seen at walk-in clinic just over 10-days ago and prescribed Amoxicillin for sinus infection, which Pt states helped a little, however, symptoms still lingering

## 2022-07-12 ENCOUNTER — TELEPHONE (OUTPATIENT)
Dept: ADMINISTRATIVE | Age: 52
End: 2022-07-12

## 2022-07-12 DIAGNOSIS — S82.091A CLOSED SLEEVE FRACTURE OF RIGHT PATELLA, INITIAL ENCOUNTER: Primary | ICD-10-CM

## 2022-07-12 NOTE — TELEPHONE ENCOUNTER
Patient  Requested a new referral to see [facility],Patient was seen in Er for Fracture Knee,Please review and sign plan and care if you agree Thank you. Anil GARZA      [facility] REFERRALS.

## 2022-07-16 ENCOUNTER — LAB ENCOUNTER (OUTPATIENT)
Dept: LAB | Age: 52
End: 2022-07-16
Attending: PLASTIC SURGERY
Payer: MEDICAID

## 2022-07-16 DIAGNOSIS — Z01.812 ENCOUNTER FOR PREOPERATIVE SCREENING LABORATORY TESTING FOR COVID-19 VIRUS: ICD-10-CM

## 2022-07-16 DIAGNOSIS — Z20.822 ENCOUNTER FOR PREOPERATIVE SCREENING LABORATORY TESTING FOR COVID-19 VIRUS: ICD-10-CM

## 2022-07-17 LAB — SARS-COV-2 RNA RESP QL NAA+PROBE: NOT DETECTED

## 2022-07-19 ENCOUNTER — HOSPITAL ENCOUNTER (OUTPATIENT)
Facility: HOSPITAL | Age: 52
Setting detail: HOSPITAL OUTPATIENT SURGERY
Discharge: HOME | End: 2022-07-19
Attending: PLASTIC SURGERY | Admitting: PLASTIC SURGERY
Payer: MEDICAID

## 2022-07-19 VITALS
OXYGEN SATURATION: 100 % | DIASTOLIC BLOOD PRESSURE: 95 MMHG | WEIGHT: 261.38 LBS | BODY MASS INDEX: 41.02 KG/M2 | HEART RATE: 69 BPM | RESPIRATION RATE: 16 BRPM | SYSTOLIC BLOOD PRESSURE: 128 MMHG | HEIGHT: 67 IN | TEMPERATURE: 97 F

## 2022-07-19 DIAGNOSIS — Z20.822 ENCOUNTER FOR PREPROCEDURE SCREENING LABORATORY TESTING FOR COVID-19: ICD-10-CM

## 2022-07-19 DIAGNOSIS — Z01.812 ENCOUNTER FOR PREPROCEDURE SCREENING LABORATORY TESTING FOR COVID-19: ICD-10-CM

## 2022-07-19 DIAGNOSIS — Z20.822 ENCOUNTER FOR PREOPERATIVE SCREENING LABORATORY TESTING FOR COVID-19 VIRUS: Primary | ICD-10-CM

## 2022-07-19 DIAGNOSIS — Z01.812 ENCOUNTER FOR PREOPERATIVE SCREENING LABORATORY TESTING FOR COVID-19 VIRUS: Primary | ICD-10-CM

## 2022-07-19 PROCEDURE — 0HB4XZZ EXCISION OF NECK SKIN, EXTERNAL APPROACH: ICD-10-PCS | Performed by: PLASTIC SURGERY

## 2022-07-19 PROCEDURE — 88304 TISSUE EXAM BY PATHOLOGIST: CPT | Performed by: PLASTIC SURGERY

## 2022-07-19 RX ORDER — LIDOCAINE HYDROCHLORIDE AND EPINEPHRINE 10; 10 MG/ML; UG/ML
INJECTION, SOLUTION INFILTRATION; PERINEURAL AS NEEDED
Status: DISCONTINUED | OUTPATIENT
Start: 2022-07-19 | End: 2022-07-19 | Stop reason: HOSPADM

## 2022-07-19 NOTE — BRIEF OP NOTE
Pre-Operative Diagnosis: EPIDERMAL CYST OF NECK     Post-Operative Diagnosis: EPIDERMAL CYST OF NECK      Procedure Performed:   EXCISION OF LEFT BASE OF NECK CYST WITH INTERMEDIATE CLOSURE    Surgeon(s) and Role:     Karolina Gorman MD - Primary    Assistant(s):  Surgical Assistant.: Ama Cleveland CSA          Estimated Blood Loss: Blood Output: 1 mL (7/19/2022  3:41 PM)        Ramya Smith MD  7/19/2022  3:49 PM

## 2022-07-20 NOTE — OPERATIVE REPORT
[facility]    PATIENT'S NAME: Domingo Arce   ATTENDING PHYSICIAN: Thomas Montes MD   OPERATING PHYSICIAN: Thomas Montes MD   PATIENT ACCOUNT#:   [de-identified]    LOCATION:  [address] [place] [address] PRE [address] [place] [address] [id number] [place] [address] Road #:   [id number]       YOB: 1970  ADMISSION DATE:       07/19/2022      OPERATION DATE:  07/19/2022    OPERATIVE REPORT      PREOPERATIVE DIAGNOSIS:  Left neck cyst.  POSTOPERATIVE DIAGNOSIS:  Left neck cyst.  PROCEDURE:  Excision of 1 cm left neck cyst and intermediate closure measuring 1 cm. ANESTHESIA:  Local.    INDICATIONS:  Patient is a 75-year-old male with a left neck cyst.  Risks and benefits of excision were discussed and consent obtained. OPERATIVE TECHNIQUE:  Patient was appropriately identified and taken to the operating room. He was positioned on operating table in supine fashion. A time-out was performed. The patient prepped and draped in the usual sterile fashion. Then, 1% lidocaine with epinephrine was injected for local field block. Elliptical incision was then planned overlying the cyst measuring 1 cm. A 15 blade was used to make incision and the lesion dissected using sharp dissection. The lesion was sent to Pathology. Hemostasis was obtained with Bovie electrocautery, and incision closed in multiple layers with 3-0 Vicryl for the deep dermis and 4-0 Prolene for the skin closure. Sterile dressing was applied. Patient tolerated the procedure well. He will follow up in the clinic in 2 weeks.     Dictated By Thomas Montes MD  d: 07/19/2022 15:51:34  t: 07/20/2022 18:59:05  [facility] [id number]  /

## 2022-07-22 DIAGNOSIS — I10 ESSENTIAL HYPERTENSION: ICD-10-CM

## 2022-07-22 RX ORDER — SPIRONOLACTONE 50 MG/1
TABLET, FILM COATED ORAL
Qty: 90 TABLET | Refills: 1 | Status: SHIPPED | OUTPATIENT
Start: 2022-07-22

## 2022-07-22 RX ORDER — ATORVASTATIN CALCIUM 10 MG/1
TABLET, FILM COATED ORAL
Qty: 90 TABLET | Refills: 1 | Status: SHIPPED | OUTPATIENT
Start: 2022-07-22

## 2022-07-22 RX ORDER — LOSARTAN POTASSIUM 100 MG/1
TABLET ORAL
Qty: 90 TABLET | Refills: 1 | Status: SHIPPED | OUTPATIENT
Start: 2022-07-22

## 2022-07-26 ENCOUNTER — OFFICE VISIT (OUTPATIENT)
Dept: FAMILY MEDICINE CLINIC | Facility: CLINIC | Age: 52
End: 2022-07-26
Payer: MEDICAID

## 2022-07-26 VITALS
RESPIRATION RATE: 18 BRPM | HEART RATE: 88 BPM | BODY MASS INDEX: 40.97 KG/M2 | OXYGEN SATURATION: 98 % | SYSTOLIC BLOOD PRESSURE: 128 MMHG | DIASTOLIC BLOOD PRESSURE: 80 MMHG | HEIGHT: 67 IN | WEIGHT: 261 LBS | TEMPERATURE: 98 F

## 2022-07-26 DIAGNOSIS — E78.00 PURE HYPERCHOLESTEROLEMIA: ICD-10-CM

## 2022-07-26 DIAGNOSIS — Z01.818 PREOP EXAMINATION: Primary | ICD-10-CM

## 2022-07-26 DIAGNOSIS — S83.206D TEAR OF RIGHT MENISCUS AS CURRENT INJURY, SUBSEQUENT ENCOUNTER: ICD-10-CM

## 2022-07-26 DIAGNOSIS — I10 ESSENTIAL HYPERTENSION: ICD-10-CM

## 2022-07-26 DIAGNOSIS — Z85.820 HISTORY OF MELANOMA: ICD-10-CM

## 2022-07-26 DIAGNOSIS — Z85.6 HISTORY OF LEUKEMIA: ICD-10-CM

## 2022-07-26 DIAGNOSIS — G47.33 OSA (OBSTRUCTIVE SLEEP APNEA): ICD-10-CM

## 2022-07-26 PROCEDURE — 3074F SYST BP LT 130 MM HG: CPT | Performed by: FAMILY MEDICINE

## 2022-07-26 PROCEDURE — 3079F DIAST BP 80-89 MM HG: CPT | Performed by: FAMILY MEDICINE

## 2022-07-26 PROCEDURE — 99243 OFF/OP CNSLTJ NEW/EST LOW 30: CPT | Performed by: FAMILY MEDICINE

## 2022-07-26 PROCEDURE — 3008F BODY MASS INDEX DOCD: CPT | Performed by: FAMILY MEDICINE

## 2022-07-26 RX ORDER — HYDROCODONE BITARTRATE AND ACETAMINOPHEN 10; 325 MG/1; MG/1
TABLET ORAL
COMMUNITY
Start: 2022-07-12

## 2022-07-27 ENCOUNTER — LAB ENCOUNTER (OUTPATIENT)
Dept: LAB | Age: 52
End: 2022-07-27
Attending: FAMILY MEDICINE
Payer: MEDICAID

## 2022-07-27 ENCOUNTER — EKG ENCOUNTER (OUTPATIENT)
Dept: LAB | Age: 52
End: 2022-07-27
Attending: FAMILY MEDICINE
Payer: MEDICAID

## 2022-07-27 DIAGNOSIS — E78.00 PURE HYPERCHOLESTEROLEMIA: ICD-10-CM

## 2022-07-27 DIAGNOSIS — I10 ESSENTIAL HYPERTENSION: ICD-10-CM

## 2022-07-27 DIAGNOSIS — Z85.6 HISTORY OF LEUKEMIA: ICD-10-CM

## 2022-07-27 DIAGNOSIS — Z01.818 PREOP EXAMINATION: ICD-10-CM

## 2022-07-27 DIAGNOSIS — Z85.820 HISTORY OF MELANOMA: ICD-10-CM

## 2022-07-27 DIAGNOSIS — G47.33 OSA (OBSTRUCTIVE SLEEP APNEA): ICD-10-CM

## 2022-07-27 DIAGNOSIS — S83.206D TEAR OF RIGHT MENISCUS AS CURRENT INJURY, SUBSEQUENT ENCOUNTER: ICD-10-CM

## 2022-07-27 LAB
ANION GAP SERPL CALC-SCNC: 4 MMOL/L (ref 0–18)
APTT PPP: 29.7 SECONDS (ref 23.3–35.6)
ATRIAL RATE: 79 BPM
BASOPHILS # BLD AUTO: 0.05 X10(3) UL (ref 0–0.2)
BASOPHILS NFR BLD AUTO: 0.7 %
BUN BLD-MCNC: 17 MG/DL (ref 7–18)
CALCIUM BLD-MCNC: 9.4 MG/DL (ref 8.5–10.1)
CHLORIDE SERPL-SCNC: 110 MMOL/L (ref 98–112)
CO2 SERPL-SCNC: 26 MMOL/L (ref 21–32)
CREAT BLD-MCNC: 0.85 MG/DL
EOSINOPHIL # BLD AUTO: 0.23 X10(3) UL (ref 0–0.7)
EOSINOPHIL NFR BLD AUTO: 3.3 %
ERYTHROCYTE [DISTWIDTH] IN BLOOD BY AUTOMATED COUNT: 14.2 %
EST. AVERAGE GLUCOSE BLD GHB EST-MCNC: 111 MG/DL (ref 68–126)
FASTING STATUS PATIENT QL REPORTED: YES
GLUCOSE BLD-MCNC: 113 MG/DL (ref 70–99)
HBA1C MFR BLD: 5.5 % (ref ?–5.7)
HCT VFR BLD AUTO: 37.2 %
HGB BLD-MCNC: 11.9 G/DL
IMM GRANULOCYTES # BLD AUTO: 0.03 X10(3) UL (ref 0–1)
IMM GRANULOCYTES NFR BLD: 0.4 %
INR BLD: 1.02 (ref 0.8–1.2)
LYMPHOCYTES # BLD AUTO: 1.31 X10(3) UL (ref 1–4)
LYMPHOCYTES NFR BLD AUTO: 18.5 %
MCH RBC QN AUTO: 30.9 PG (ref 26–34)
MCHC RBC AUTO-ENTMCNC: 32 G/DL (ref 31–37)
MCV RBC AUTO: 96.6 FL
MONOCYTES # BLD AUTO: 0.68 X10(3) UL (ref 0.1–1)
MONOCYTES NFR BLD AUTO: 9.6 %
NEUTROPHILS # BLD AUTO: 4.77 X10 (3) UL (ref 1.5–7.7)
NEUTROPHILS # BLD AUTO: 4.77 X10(3) UL (ref 1.5–7.7)
NEUTROPHILS NFR BLD AUTO: 67.5 %
OSMOLALITY SERPL CALC.SUM OF ELEC: 292 MOSM/KG (ref 275–295)
P AXIS: 40 DEGREES
P-R INTERVAL: 158 MS
PLATELET # BLD AUTO: 173 10(3)UL (ref 150–450)
POTASSIUM SERPL-SCNC: 4.4 MMOL/L (ref 3.5–5.1)
PROTHROMBIN TIME: 13.4 SECONDS (ref 11.6–14.8)
Q-T INTERVAL: 394 MS
QRS DURATION: 98 MS
QTC CALCULATION (BEZET): 451 MS
R AXIS: 36 DEGREES
RBC # BLD AUTO: 3.85 X10(6)UL
SODIUM SERPL-SCNC: 140 MMOL/L (ref 136–145)
T AXIS: 26 DEGREES
VENTRICULAR RATE: 79 BPM
WBC # BLD AUTO: 7.1 X10(3) UL (ref 4–11)

## 2022-07-27 PROCEDURE — 85730 THROMBOPLASTIN TIME PARTIAL: CPT | Performed by: FAMILY MEDICINE

## 2022-07-27 PROCEDURE — 36415 COLL VENOUS BLD VENIPUNCTURE: CPT | Performed by: FAMILY MEDICINE

## 2022-07-27 PROCEDURE — 85025 COMPLETE CBC W/AUTO DIFF WBC: CPT | Performed by: FAMILY MEDICINE

## 2022-07-27 PROCEDURE — 83036 HEMOGLOBIN GLYCOSYLATED A1C: CPT | Performed by: FAMILY MEDICINE

## 2022-07-27 PROCEDURE — 85610 PROTHROMBIN TIME: CPT | Performed by: FAMILY MEDICINE

## 2022-07-27 PROCEDURE — 93005 ELECTROCARDIOGRAM TRACING: CPT

## 2022-07-27 PROCEDURE — 80048 BASIC METABOLIC PNL TOTAL CA: CPT | Performed by: FAMILY MEDICINE

## 2022-07-27 PROCEDURE — 93010 ELECTROCARDIOGRAM REPORT: CPT | Performed by: INTERNAL MEDICINE

## 2022-08-08 ENCOUNTER — OFFICE VISIT (OUTPATIENT)
Dept: FAMILY MEDICINE CLINIC | Facility: CLINIC | Age: 52
End: 2022-08-08
Payer: MEDICAID

## 2022-08-08 VITALS
TEMPERATURE: 98 F | RESPIRATION RATE: 16 BRPM | WEIGHT: 260 LBS | BODY MASS INDEX: 40.81 KG/M2 | HEART RATE: 88 BPM | HEIGHT: 67 IN | OXYGEN SATURATION: 99 %

## 2022-08-08 DIAGNOSIS — H92.01 RIGHT EAR PAIN: ICD-10-CM

## 2022-08-08 DIAGNOSIS — J01.00 ACUTE NON-RECURRENT MAXILLARY SINUSITIS: Primary | ICD-10-CM

## 2022-08-08 PROCEDURE — 3008F BODY MASS INDEX DOCD: CPT | Performed by: NURSE PRACTITIONER

## 2022-08-08 PROCEDURE — 99213 OFFICE O/P EST LOW 20 MIN: CPT | Performed by: NURSE PRACTITIONER

## 2022-08-08 RX ORDER — PREDNISONE 20 MG/1
40 TABLET ORAL DAILY
Qty: 10 TABLET | Refills: 0 | Status: SHIPPED | OUTPATIENT
Start: 2022-08-08 | End: 2022-08-13

## 2022-08-08 RX ORDER — AMOXICILLIN AND CLAVULANATE POTASSIUM 875; 125 MG/1; MG/1
1 TABLET, FILM COATED ORAL 2 TIMES DAILY
Qty: 20 TABLET | Refills: 0 | Status: SHIPPED | OUTPATIENT
Start: 2022-08-08 | End: 2022-08-18

## 2022-08-08 RX ORDER — FLUTICASONE PROPIONATE 50 MCG
2 SPRAY, SUSPENSION (ML) NASAL DAILY
Qty: 3 EACH | Refills: 1 | Status: SHIPPED | OUTPATIENT
Start: 2022-08-08 | End: 2023-08-03

## 2022-08-21 ENCOUNTER — HOSPITAL ENCOUNTER (OUTPATIENT)
Age: 52
Discharge: HOME | End: 2022-08-21
Payer: MEDICAID

## 2022-08-21 VITALS
OXYGEN SATURATION: 98 % | HEIGHT: 67 IN | TEMPERATURE: 98 F | BODY MASS INDEX: 40.81 KG/M2 | HEART RATE: 91 BPM | WEIGHT: 260 LBS | RESPIRATION RATE: 18 BRPM | SYSTOLIC BLOOD PRESSURE: 153 MMHG | DIASTOLIC BLOOD PRESSURE: 88 MMHG

## 2022-08-21 DIAGNOSIS — J32.0 CHRONIC MAXILLARY SINUSITIS: Primary | ICD-10-CM

## 2022-08-21 DIAGNOSIS — H60.502 ACUTE OTITIS EXTERNA OF LEFT EAR, UNSPECIFIED TYPE: ICD-10-CM

## 2022-08-21 LAB — SARS-COV-2 RNA RESP QL NAA+PROBE: NOT DETECTED

## 2022-08-21 PROCEDURE — 99213 OFFICE O/P EST LOW 20 MIN: CPT

## 2022-08-21 RX ORDER — NEOMYCIN SULFATE, POLYMYXIN B SULFATE, HYDROCORTISONE 3.5; 10000; 1 MG/ML; [USP'U]/ML; MG/ML
3 SOLUTION/ DROPS AURICULAR (OTIC) 3 TIMES DAILY
Qty: 1 EACH | Refills: 0 | Status: SHIPPED | OUTPATIENT
Start: 2022-08-21 | End: 2022-08-31

## 2022-08-21 RX ORDER — DEXAMETHASONE 4 MG/1
16 TABLET ORAL ONCE
Status: COMPLETED | OUTPATIENT
Start: 2022-08-21 | End: 2022-08-21

## 2022-08-21 RX ORDER — PREDNISONE 20 MG/1
40 TABLET ORAL DAILY
Qty: 10 TABLET | Refills: 0 | Status: SHIPPED | OUTPATIENT
Start: 2022-08-21 | End: 2022-08-26

## 2022-08-21 RX ORDER — DOXYCYCLINE HYCLATE 100 MG/1
100 CAPSULE ORAL 2 TIMES DAILY
Qty: 14 CAPSULE | Refills: 0 | Status: SHIPPED | OUTPATIENT
Start: 2022-08-21 | End: 2022-08-28

## 2022-08-21 NOTE — ED INITIAL ASSESSMENT (HPI)
Pt hs had sinus congestion and pressure for the past 4 weeks, was placed on Augmentin and steroid on 8/8 and it never helped.   Pt was negative for covid

## 2022-10-20 ENCOUNTER — OFFICE VISIT (OUTPATIENT)
Dept: FAMILY MEDICINE CLINIC | Facility: CLINIC | Age: 52
End: 2022-10-20
Payer: COMMERCIAL

## 2022-10-20 ENCOUNTER — LAB ENCOUNTER (OUTPATIENT)
Dept: LAB | Age: 52
End: 2022-10-20
Attending: FAMILY MEDICINE
Payer: MEDICAID

## 2022-10-20 VITALS
WEIGHT: 273 LBS | RESPIRATION RATE: 20 BRPM | BODY MASS INDEX: 42.85 KG/M2 | OXYGEN SATURATION: 99 % | HEART RATE: 97 BPM | SYSTOLIC BLOOD PRESSURE: 128 MMHG | DIASTOLIC BLOOD PRESSURE: 84 MMHG | HEIGHT: 67 IN

## 2022-10-20 DIAGNOSIS — M67.431 GANGLION CYST OF WRIST, RIGHT: ICD-10-CM

## 2022-10-20 DIAGNOSIS — I10 ESSENTIAL HYPERTENSION: ICD-10-CM

## 2022-10-20 DIAGNOSIS — Z79.899 MEDICATION MANAGEMENT: Primary | ICD-10-CM

## 2022-10-20 DIAGNOSIS — M19.90 ARTHRITIS: ICD-10-CM

## 2022-10-20 DIAGNOSIS — M76.51 PATELLAR TENDONITIS OF RIGHT KNEE: ICD-10-CM

## 2022-10-20 LAB
ANION GAP SERPL CALC-SCNC: 6 MMOL/L (ref 0–18)
BASOPHILS # BLD AUTO: 0.12 X10(3) UL (ref 0–0.2)
BASOPHILS NFR BLD AUTO: 1 %
BUN BLD-MCNC: 21 MG/DL (ref 7–18)
CALCIUM BLD-MCNC: 9.2 MG/DL (ref 8.5–10.1)
CHLORIDE SERPL-SCNC: 108 MMOL/L (ref 98–112)
CO2 SERPL-SCNC: 25 MMOL/L (ref 21–32)
CREAT BLD-MCNC: 1.07 MG/DL
EOSINOPHIL # BLD AUTO: 0.32 X10(3) UL (ref 0–0.7)
EOSINOPHIL NFR BLD AUTO: 2.6 %
ERYTHROCYTE [DISTWIDTH] IN BLOOD BY AUTOMATED COUNT: 12.8 %
FASTING STATUS PATIENT QL REPORTED: YES
GFR SERPLBLD BASED ON 1.73 SQ M-ARVRAT: 83 ML/MIN/1.73M2 (ref 60–?)
GLUCOSE BLD-MCNC: 102 MG/DL (ref 70–99)
HCT VFR BLD AUTO: 37.6 %
HGB BLD-MCNC: 12.7 G/DL
IMM GRANULOCYTES # BLD AUTO: 0.05 X10(3) UL (ref 0–1)
IMM GRANULOCYTES NFR BLD: 0.4 %
LYMPHOCYTES # BLD AUTO: 3.07 X10(3) UL (ref 1–4)
LYMPHOCYTES NFR BLD AUTO: 25.1 %
MCH RBC QN AUTO: 31.6 PG (ref 26–34)
MCHC RBC AUTO-ENTMCNC: 33.8 G/DL (ref 31–37)
MCV RBC AUTO: 93.5 FL
MONOCYTES # BLD AUTO: 1.41 X10(3) UL (ref 0.1–1)
MONOCYTES NFR BLD AUTO: 11.5 %
NEUTROPHILS # BLD AUTO: 7.24 X10 (3) UL (ref 1.5–7.7)
NEUTROPHILS # BLD AUTO: 7.24 X10(3) UL (ref 1.5–7.7)
NEUTROPHILS NFR BLD AUTO: 59.4 %
OSMOLALITY SERPL CALC.SUM OF ELEC: 291 MOSM/KG (ref 275–295)
PLATELET # BLD AUTO: 160 10(3)UL (ref 150–450)
POTASSIUM SERPL-SCNC: 4 MMOL/L (ref 3.5–5.1)
RBC # BLD AUTO: 4.02 X10(6)UL
SODIUM SERPL-SCNC: 139 MMOL/L (ref 136–145)
WBC # BLD AUTO: 12.2 X10(3) UL (ref 4–11)

## 2022-10-20 PROCEDURE — 3079F DIAST BP 80-89 MM HG: CPT | Performed by: FAMILY MEDICINE

## 2022-10-20 PROCEDURE — 80048 BASIC METABOLIC PNL TOTAL CA: CPT | Performed by: FAMILY MEDICINE

## 2022-10-20 PROCEDURE — 36415 COLL VENOUS BLD VENIPUNCTURE: CPT | Performed by: FAMILY MEDICINE

## 2022-10-20 PROCEDURE — 3074F SYST BP LT 130 MM HG: CPT | Performed by: FAMILY MEDICINE

## 2022-10-20 PROCEDURE — 99214 OFFICE O/P EST MOD 30 MIN: CPT | Performed by: FAMILY MEDICINE

## 2022-10-20 PROCEDURE — 85025 COMPLETE CBC W/AUTO DIFF WBC: CPT | Performed by: FAMILY MEDICINE

## 2022-10-20 PROCEDURE — 3008F BODY MASS INDEX DOCD: CPT | Performed by: FAMILY MEDICINE

## 2022-10-20 RX ORDER — AZELASTINE 1 MG/ML
2 SPRAY, METERED NASAL 2 TIMES DAILY
COMMUNITY
Start: 2022-10-13 | End: 2022-11-29 | Stop reason: ALTCHOICE

## 2022-10-20 RX ORDER — METOPROLOL TARTRATE 100 MG/1
100 TABLET ORAL 2 TIMES DAILY
Qty: 60 TABLET | Refills: 0 | Status: CANCELLED | OUTPATIENT
Start: 2022-10-20

## 2022-10-20 RX ORDER — AZITHROMYCIN 250 MG/1
TABLET, FILM COATED ORAL
COMMUNITY
Start: 2022-09-08 | End: 2022-10-20

## 2022-10-20 RX ORDER — DASATINIB 100 MG/1
1 TABLET ORAL DAILY
Refills: 0 | COMMUNITY
Start: 2022-10-20

## 2022-10-20 RX ORDER — CEPHALEXIN 500 MG/1
CAPSULE ORAL
COMMUNITY
Start: 2022-08-02 | End: 2022-10-20

## 2022-10-20 RX ORDER — ATORVASTATIN CALCIUM 10 MG/1
1 TABLET, FILM COATED ORAL AS DIRECTED
COMMUNITY
End: 2022-10-20

## 2022-10-20 RX ORDER — MELOXICAM 7.5 MG/1
7.5 TABLET ORAL DAILY
Qty: 30 TABLET | Refills: 3 | Status: SHIPPED | OUTPATIENT
Start: 2022-10-20

## 2022-10-20 RX ORDER — DICLOFENAC SODIUM 75 MG/1
TABLET, DELAYED RELEASE ORAL
Refills: 0 | Status: CANCELLED | OUTPATIENT
Start: 2022-10-20

## 2022-10-20 RX ORDER — DOXYCYCLINE HYCLATE 100 MG/1
1 CAPSULE ORAL AS DIRECTED
COMMUNITY
End: 2022-10-20

## 2022-10-20 RX ORDER — HYDROCODONE BITARTRATE AND ACETAMINOPHEN 5; 325 MG/1; MG/1
TABLET ORAL
COMMUNITY
Start: 2022-08-02 | End: 2022-10-20

## 2022-10-20 RX ORDER — DICLOFENAC SODIUM 75 MG/1
TABLET, DELAYED RELEASE ORAL
COMMUNITY
End: 2022-10-20

## 2022-10-20 RX ORDER — METOPROLOL TARTRATE 100 MG/1
100 TABLET ORAL 2 TIMES DAILY
COMMUNITY

## 2022-11-29 ENCOUNTER — OFFICE VISIT (OUTPATIENT)
Dept: FAMILY MEDICINE CLINIC | Facility: CLINIC | Age: 52
End: 2022-11-29
Payer: COMMERCIAL

## 2022-11-29 VITALS
WEIGHT: 278 LBS | OXYGEN SATURATION: 98 % | BODY MASS INDEX: 43.63 KG/M2 | RESPIRATION RATE: 16 BRPM | DIASTOLIC BLOOD PRESSURE: 86 MMHG | SYSTOLIC BLOOD PRESSURE: 130 MMHG | HEIGHT: 67 IN | HEART RATE: 80 BPM

## 2022-11-29 DIAGNOSIS — R06.83 SNORING: ICD-10-CM

## 2022-11-29 DIAGNOSIS — Z85.820 HISTORY OF MELANOMA: ICD-10-CM

## 2022-11-29 DIAGNOSIS — Z00.00 ANNUAL PHYSICAL EXAM: Primary | ICD-10-CM

## 2022-11-29 DIAGNOSIS — Z85.6 HISTORY OF LEUKEMIA: ICD-10-CM

## 2022-11-29 DIAGNOSIS — M67.40 GANGLION CYST: ICD-10-CM

## 2022-11-29 DIAGNOSIS — M17.11 PRIMARY OSTEOARTHRITIS OF RIGHT KNEE: ICD-10-CM

## 2022-11-29 PROCEDURE — 90471 IMMUNIZATION ADMIN: CPT | Performed by: FAMILY MEDICINE

## 2022-11-29 PROCEDURE — 3075F SYST BP GE 130 - 139MM HG: CPT | Performed by: FAMILY MEDICINE

## 2022-11-29 PROCEDURE — 99396 PREV VISIT EST AGE 40-64: CPT | Performed by: FAMILY MEDICINE

## 2022-11-29 PROCEDURE — 3079F DIAST BP 80-89 MM HG: CPT | Performed by: FAMILY MEDICINE

## 2022-11-29 PROCEDURE — 3008F BODY MASS INDEX DOCD: CPT | Performed by: FAMILY MEDICINE

## 2022-11-29 PROCEDURE — 90715 TDAP VACCINE 7 YRS/> IM: CPT | Performed by: FAMILY MEDICINE

## 2022-11-29 RX ORDER — DICLOFENAC SODIUM 75 MG/1
75 TABLET, DELAYED RELEASE ORAL 2 TIMES DAILY PRN
Qty: 60 TABLET | Refills: 1 | Status: SHIPPED | OUTPATIENT
Start: 2022-11-29

## 2022-12-02 DIAGNOSIS — I10 ESSENTIAL HYPERTENSION: ICD-10-CM

## 2022-12-02 LAB
ABSOLUTE BASOPHILS: 74 CELLS/UL (ref 0–200)
ABSOLUTE EOSINOPHILS: 221 CELLS/UL (ref 15–500)
ABSOLUTE LYMPHOCYTES: 2741 CELLS/UL (ref 850–3900)
ABSOLUTE MONOCYTES: 1313 CELLS/UL (ref 200–950)
ABSOLUTE NEUTROPHILS: 6153 CELLS/UL (ref 1500–7800)
ALBUMIN/GLOBULIN RATIO: 1.6 (CALC) (ref 1–2.5)
ALBUMIN: 4.2 G/DL (ref 3.6–5.1)
ALKALINE PHOSPHATASE: 77 U/L (ref 35–144)
ALT: 23 U/L (ref 9–46)
AST: 15 U/L (ref 10–35)
BASOPHILS: 0.7 %
BILIRUBIN, TOTAL: 0.5 MG/DL (ref 0.2–1.2)
BUN: 15 MG/DL (ref 7–25)
CALCIUM: 9.6 MG/DL (ref 8.6–10.3)
CARBON DIOXIDE: 26 MMOL/L (ref 20–32)
CHLORIDE: 104 MMOL/L (ref 98–110)
CHOL/HDLC RATIO: 2.5 (CALC)
CHOLESTEROL, TOTAL: 148 MG/DL
CREATININE: 0.86 MG/DL (ref 0.7–1.3)
EGFR: 104 ML/MIN/1.73M2
EOSINOPHILS: 2.1 %
GLOBULIN: 2.7 G/DL (CALC) (ref 1.9–3.7)
GLUCOSE: 94 MG/DL (ref 65–99)
HDL CHOLESTEROL: 59 MG/DL
HEMATOCRIT: 38.2 % (ref 38.5–50)
HEMOGLOBIN A1C: 5 % OF TOTAL HGB
HEMOGLOBIN: 12.5 G/DL (ref 13.2–17.1)
LDL-CHOLESTEROL: 69 MG/DL (CALC)
LYMPHOCYTES: 26.1 %
MCH: 30.4 PG (ref 27–33)
MCHC: 32.7 G/DL (ref 32–36)
MCV: 92.9 FL (ref 80–100)
MONOCYTES: 12.5 %
MPV: 10.3 FL (ref 7.5–12.5)
NEUTROPHILS: 58.6 %
NON-HDL CHOLESTEROL: 89 MG/DL (CALC)
PLATELET COUNT: 152 THOUSAND/UL (ref 140–400)
POTASSIUM: 4.3 MMOL/L (ref 3.5–5.3)
PROTEIN, TOTAL: 6.9 G/DL (ref 6.1–8.1)
RDW: 12.3 % (ref 11–15)
RED BLOOD CELL COUNT: 4.11 MILLION/UL (ref 4.2–5.8)
SODIUM: 137 MMOL/L (ref 135–146)
T4, FREE: 1.1 NG/DL (ref 0.8–1.8)
TOTAL PSA: 0.6 NG/ML
TRIGLYCERIDES: 121 MG/DL
TSH: 1.27 MIU/L (ref 0.4–4.5)
VITAMIN B12: 867 PG/ML (ref 200–1100)
VITAMIN D, 25-OH, TOTAL: 55 NG/ML (ref 30–100)
WHITE BLOOD CELL COUNT: 10.5 THOUSAND/UL (ref 3.8–10.8)

## 2022-12-02 RX ORDER — SPIRONOLACTONE 50 MG/1
TABLET, FILM COATED ORAL
Qty: 90 TABLET | Refills: 1 | OUTPATIENT
Start: 2022-12-02

## 2023-01-13 ENCOUNTER — TELEPHONE (OUTPATIENT)
Dept: ORTHOPEDICS CLINIC | Facility: CLINIC | Age: 53
End: 2023-01-13

## 2023-01-13 ENCOUNTER — OFFICE VISIT (OUTPATIENT)
Dept: ORTHOPEDICS CLINIC | Facility: CLINIC | Age: 53
End: 2023-01-13
Payer: COMMERCIAL

## 2023-01-13 VITALS — BODY MASS INDEX: 40.81 KG/M2 | WEIGHT: 260 LBS | HEIGHT: 67 IN

## 2023-01-13 DIAGNOSIS — G56.21 CUBITAL TUNNEL SYNDROME ON RIGHT: ICD-10-CM

## 2023-01-13 DIAGNOSIS — G56.01 CARPAL TUNNEL SYNDROME OF RIGHT WRIST: Primary | ICD-10-CM

## 2023-01-13 DIAGNOSIS — M67.431 GANGLION CYST OF WRIST, RIGHT: ICD-10-CM

## 2023-01-13 PROCEDURE — 99204 OFFICE O/P NEW MOD 45 MIN: CPT | Performed by: ORTHOPAEDIC SURGERY

## 2023-01-13 PROCEDURE — 3008F BODY MASS INDEX DOCD: CPT | Performed by: ORTHOPAEDIC SURGERY

## 2023-01-13 RX ORDER — SPIRONOLACTONE 50 MG/1
TABLET, FILM COATED ORAL
COMMUNITY
Start: 2022-11-30

## 2023-01-13 NOTE — TELEPHONE ENCOUNTER
When patient left his appointment today, he was told to schedule an MRI, but there is no order in the chart. Please enter it ASAP so the patient can schedule it.

## 2023-01-13 NOTE — TELEPHONE ENCOUNTER
Spoke to patient and informed of doctors recommendations, patient expressed understanding with intent to comply with treatment plan. No further questions or concerns at this time.

## 2023-01-26 ENCOUNTER — HOSPITAL ENCOUNTER (OUTPATIENT)
Dept: MRI IMAGING | Facility: HOSPITAL | Age: 53
Discharge: HOME | End: 2023-01-26
Attending: ORTHOPAEDIC SURGERY
Payer: COMMERCIAL

## 2023-01-26 DIAGNOSIS — M67.431 GANGLION CYST OF WRIST, RIGHT: ICD-10-CM

## 2023-01-26 PROCEDURE — 73221 MRI JOINT UPR EXTREM W/O DYE: CPT | Performed by: ORTHOPAEDIC SURGERY

## 2023-02-20 ENCOUNTER — PROCEDURE VISIT (OUTPATIENT)
Dept: PHYSICAL MEDICINE AND REHAB | Facility: CLINIC | Age: 53
End: 2023-02-20
Payer: COMMERCIAL

## 2023-03-02 ENCOUNTER — OFFICE VISIT (OUTPATIENT)
Dept: ORTHOPEDICS CLINIC | Facility: CLINIC | Age: 53
End: 2023-03-02
Payer: COMMERCIAL

## 2023-03-02 VITALS — BODY MASS INDEX: 40.81 KG/M2 | WEIGHT: 260 LBS | HEIGHT: 67 IN

## 2023-03-02 DIAGNOSIS — G56.01 CARPAL TUNNEL SYNDROME OF RIGHT WRIST: Primary | ICD-10-CM

## 2023-03-02 DIAGNOSIS — M67.431 GANGLION CYST OF WRIST, RIGHT: ICD-10-CM

## 2023-03-02 DIAGNOSIS — G56.21 CUBITAL TUNNEL SYNDROME ON RIGHT: ICD-10-CM

## 2023-03-02 PROCEDURE — 99215 OFFICE O/P EST HI 40 MIN: CPT | Performed by: ORTHOPAEDIC SURGERY

## 2023-03-02 PROCEDURE — 3008F BODY MASS INDEX DOCD: CPT | Performed by: ORTHOPAEDIC SURGERY

## 2023-03-09 ENCOUNTER — OFFICE VISIT (OUTPATIENT)
Dept: SLEEP CENTER | Age: 53
End: 2023-03-09
Attending: INTERNAL MEDICINE
Payer: COMMERCIAL

## 2023-03-09 DIAGNOSIS — R06.83 SNORING: ICD-10-CM

## 2023-03-09 PROCEDURE — 95806 SLEEP STUDY UNATT&RESP EFFT: CPT

## 2023-03-13 NOTE — PROCEDURES
[address]       Accredited by the [employer] Sleep Medicine (AASM)    PATIENT'S NAME:        Po See  ATTENDING PHYSICIAN:   Kaelyn Torres M.D. REFERRING PHYSICIAN:   Moses Pena D.O.  PATIENT ACCOUNT #:     [de-identified]        LOCATION:       [address] [place] [address] #:      [id number]        YOB: 1970  DATE OF STUDY:         03/09/2023    SLEEP STUDY REPORT    STUDY TYPE:  Unattended sleep study. Height 5 feet 7 inches. Weight 278 pounds. BMI 44. ICD-10 G47.33. CLINICAL HISTORY:  The patient is a 49-year-old male who reports snoring and daytime fatigue. UNATTENDED SLEEP STUDY RECORDING PARAMETERS:  The patient underwent a formal technically adequate unattended diagnostic sleep study coordinated with the [facility]. The study was performed in accordance with the AASM standard for Out of Center Sleep Testing. The four-channel Type III HST measures the following parameters:  flow, respiratory effort, pulse, and oxygen saturation. SCORING:  This study was scored in accordance with AASM scoring rules and Medicare rule 1B. RECORDING DETAILS:  A total of 8 hours and 53 was recorded using an ApneaLink Air type III home sleep test recording device. STATISTICS:  The apnea-hypopnea index was 6.4. Oxyhemoglobin saturation dora was 83%. IMPRESSION:  This study demonstrates obstructive sleep apnea. RECOMMENDATIONS:  The patient should follow up in the sleep clinic for consultation and further recommendations. My office will contact the patient to schedule this consult. A trial of nasal CPAP can be considered at that time. The patient should avoid use of alcohol and sedative medications at bedtime and engage in a formal weight loss program.  This patient should avoid driving while sleepy at all times.     Dictated By Javier Nguyen M.D.  d: 03/13/2023 15:40:30  t: 03/13/2023 15:51:42  [facility] [id number]  KIAN/    cc: Arslan Torres D.O.

## 2023-03-20 ENCOUNTER — TELEPHONE (OUTPATIENT)
Dept: ORTHOPEDICS CLINIC | Facility: CLINIC | Age: 53
End: 2023-03-20

## 2023-03-20 DIAGNOSIS — M67.431 GANGLION CYST OF WRIST, RIGHT: ICD-10-CM

## 2023-03-20 DIAGNOSIS — G56.21 CUBITAL TUNNEL SYNDROME ON RIGHT: ICD-10-CM

## 2023-03-20 DIAGNOSIS — G56.01 CARPAL TUNNEL SYNDROME OF RIGHT WRIST: Primary | ICD-10-CM

## 2023-03-20 NOTE — PROGRESS NOTES
SURGICAL BOOKING SHEET   Name: Arnie Das  MRN: [id number]   : 1970    Surgical Date:      Surgical Consent:   Excision of right volar ganglion cyst, right endoscopic carpal tunnel wrist possible open, and right ulnar nerve transposition   Diagnosis:    (G56.01) Carpal tunnel syndrome of right wrist  (primary encounter diagnosis)  (G56.21) Cubital tunnel syndrome on right  (P41.362) Ganglion cyst of wrist, right    Procedure Codes:   Carpal Tunnel Release Endoscopic (82081), Cubital Tunnel Release (28285) and Excision of ganglion cyst (93173)   Disposition:   Outpatient   Operative Time:   2 hrs   Antibiotics:   Ancef 2g   Anesthesia Type:   Regional   Clearance:    NONE   Equipment:   ECTR: Bibb Blade, Microaire Endoscopic System, Lead Hand (on standby), 5-0 moncryl, Exofin, Telfa+Tegaderm  or Submuscular Ulnar Nerve Transposition Sterile Tourniquet, 10 Uzbek Drain, 1% lidocaine with epinephrine, Vessel loops x2, Suture: 0 Vicryl UR-6, 3-0 Vicryl, 3-0 Monocryl, 4-0 Monocryl, 3-0 nylon, Skin glue & Steri-Strips  and Sling   Positioning:   Supine with arm table on OR table   Assistant:   Assistant: Baljeet Kwok PA-C   Follow Up:   14 days post op with Keaton Hansen   Pain Medication:   Norco   Therapy:   None

## 2023-03-30 RX ORDER — SPIRONOLACTONE 50 MG/1
TABLET, FILM COATED ORAL
Qty: 90 TABLET | Refills: 0 | Status: SHIPPED | OUTPATIENT
Start: 2023-03-30

## 2023-04-04 DIAGNOSIS — I10 ESSENTIAL HYPERTENSION: ICD-10-CM

## 2023-04-04 DIAGNOSIS — M17.11 PRIMARY OSTEOARTHRITIS OF RIGHT KNEE: ICD-10-CM

## 2023-04-04 RX ORDER — DICLOFENAC SODIUM 75 MG/1
75 TABLET, DELAYED RELEASE ORAL 2 TIMES DAILY PRN
Qty: 60 TABLET | Refills: 1 | Status: SHIPPED | OUTPATIENT
Start: 2023-04-04

## 2023-04-04 RX ORDER — LOSARTAN POTASSIUM 100 MG/1
100 TABLET ORAL DAILY
Qty: 90 TABLET | Refills: 0 | Status: SHIPPED | OUTPATIENT
Start: 2023-04-04

## 2023-04-17 ENCOUNTER — LABORATORY ENCOUNTER (OUTPATIENT)
Dept: LAB | Age: 53
End: 2023-04-17
Attending: ORTHOPAEDIC SURGERY
Payer: COMMERCIAL

## 2023-04-17 ENCOUNTER — EKG ENCOUNTER (OUTPATIENT)
Dept: LAB | Age: 53
End: 2023-04-17
Attending: ORTHOPAEDIC SURGERY
Payer: COMMERCIAL

## 2023-04-17 DIAGNOSIS — G56.21 CUBITAL TUNNEL SYNDROME ON RIGHT: ICD-10-CM

## 2023-04-17 LAB
ALBUMIN SERPL-MCNC: 3.7 G/DL (ref 3.4–5)
ALBUMIN/GLOB SERPL: 1.1 {RATIO} (ref 1–2)
ALP LIVER SERPL-CCNC: 80 U/L
ALT SERPL-CCNC: 59 U/L
ANION GAP SERPL CALC-SCNC: 1 MMOL/L (ref 0–18)
AST SERPL-CCNC: 23 U/L (ref 15–37)
ATRIAL RATE: 81 BPM
BASOPHILS # BLD AUTO: 0.08 X10(3) UL (ref 0–0.2)
BASOPHILS NFR BLD AUTO: 1.2 %
BILIRUB SERPL-MCNC: 0.3 MG/DL (ref 0.1–2)
BUN BLD-MCNC: 24 MG/DL (ref 7–18)
CALCIUM BLD-MCNC: 8.9 MG/DL (ref 8.5–10.1)
CHLORIDE SERPL-SCNC: 111 MMOL/L (ref 98–112)
CO2 SERPL-SCNC: 26 MMOL/L (ref 21–32)
CREAT BLD-MCNC: 0.96 MG/DL
EOSINOPHIL # BLD AUTO: 0.27 X10(3) UL (ref 0–0.7)
EOSINOPHIL NFR BLD AUTO: 4 %
ERYTHROCYTE [DISTWIDTH] IN BLOOD BY AUTOMATED COUNT: 13.4 %
FASTING STATUS PATIENT QL REPORTED: YES
GFR SERPLBLD BASED ON 1.73 SQ M-ARVRAT: 94 ML/MIN/1.73M2 (ref 60–?)
GLOBULIN PLAS-MCNC: 3.4 G/DL (ref 2.8–4.4)
GLUCOSE BLD-MCNC: 112 MG/DL (ref 70–99)
HCT VFR BLD AUTO: 37.3 %
HGB BLD-MCNC: 11.8 G/DL
IMM GRANULOCYTES # BLD AUTO: 0.02 X10(3) UL (ref 0–1)
IMM GRANULOCYTES NFR BLD: 0.3 %
LYMPHOCYTES # BLD AUTO: 1.38 X10(3) UL (ref 1–4)
LYMPHOCYTES NFR BLD AUTO: 20.4 %
MCH RBC QN AUTO: 30.5 PG (ref 26–34)
MCHC RBC AUTO-ENTMCNC: 31.6 G/DL (ref 31–37)
MCV RBC AUTO: 96.4 FL
MONOCYTES # BLD AUTO: 1 X10(3) UL (ref 0.1–1)
MONOCYTES NFR BLD AUTO: 14.8 %
NEUTROPHILS # BLD AUTO: 4 X10 (3) UL (ref 1.5–7.7)
NEUTROPHILS # BLD AUTO: 4 X10(3) UL (ref 1.5–7.7)
NEUTROPHILS NFR BLD AUTO: 59.3 %
OSMOLALITY SERPL CALC.SUM OF ELEC: 291 MOSM/KG (ref 275–295)
P AXIS: 20 DEGREES
P-R INTERVAL: 162 MS
PLATELET # BLD AUTO: 248 10(3)UL (ref 150–450)
POTASSIUM SERPL-SCNC: 4.2 MMOL/L (ref 3.5–5.1)
PROT SERPL-MCNC: 7.1 G/DL (ref 6.4–8.2)
Q-T INTERVAL: 386 MS
QRS DURATION: 98 MS
QTC CALCULATION (BEZET): 448 MS
R AXIS: 35 DEGREES
RBC # BLD AUTO: 3.87 X10(6)UL
SODIUM SERPL-SCNC: 138 MMOL/L (ref 136–145)
T AXIS: 29 DEGREES
VENTRICULAR RATE: 81 BPM
WBC # BLD AUTO: 6.8 X10(3) UL (ref 4–11)

## 2023-04-17 PROCEDURE — 93010 ELECTROCARDIOGRAM REPORT: CPT | Performed by: INTERNAL MEDICINE

## 2023-04-17 PROCEDURE — 80053 COMPREHEN METABOLIC PANEL: CPT

## 2023-04-17 PROCEDURE — 85025 COMPLETE CBC W/AUTO DIFF WBC: CPT

## 2023-04-17 PROCEDURE — 36415 COLL VENOUS BLD VENIPUNCTURE: CPT

## 2023-04-17 PROCEDURE — 93005 ELECTROCARDIOGRAM TRACING: CPT

## 2023-04-25 ENCOUNTER — OFFICE VISIT (OUTPATIENT)
Dept: FAMILY MEDICINE CLINIC | Facility: CLINIC | Age: 53
End: 2023-04-25
Payer: COMMERCIAL

## 2023-04-25 VITALS
OXYGEN SATURATION: 98 % | SYSTOLIC BLOOD PRESSURE: 124 MMHG | RESPIRATION RATE: 16 BRPM | WEIGHT: 272 LBS | HEIGHT: 67 IN | BODY MASS INDEX: 42.69 KG/M2 | DIASTOLIC BLOOD PRESSURE: 74 MMHG | HEART RATE: 68 BPM

## 2023-04-25 DIAGNOSIS — Z98.84 LAP-BAND SURGERY STATUS: ICD-10-CM

## 2023-04-25 DIAGNOSIS — Z87.891 FORMER TOBACCO USE: ICD-10-CM

## 2023-04-25 DIAGNOSIS — Z85.6 HISTORY OF LEUKEMIA: ICD-10-CM

## 2023-04-25 DIAGNOSIS — K62.89 RECTAL PAIN: ICD-10-CM

## 2023-04-25 DIAGNOSIS — G47.33 OSA (OBSTRUCTIVE SLEEP APNEA): Primary | ICD-10-CM

## 2023-04-25 DIAGNOSIS — Z85.820 HISTORY OF MELANOMA: ICD-10-CM

## 2023-04-25 DIAGNOSIS — Z77.128 EXPOSURE TO ENVIRONMENTAL HAZARD: ICD-10-CM

## 2023-04-25 DIAGNOSIS — Z86.010 HISTORY OF ADENOMATOUS POLYP OF COLON: ICD-10-CM

## 2023-04-25 PROCEDURE — 3078F DIAST BP <80 MM HG: CPT | Performed by: FAMILY MEDICINE

## 2023-04-25 PROCEDURE — 3074F SYST BP LT 130 MM HG: CPT | Performed by: FAMILY MEDICINE

## 2023-04-25 PROCEDURE — 3008F BODY MASS INDEX DOCD: CPT | Performed by: FAMILY MEDICINE

## 2023-04-25 PROCEDURE — 99214 OFFICE O/P EST MOD 30 MIN: CPT | Performed by: FAMILY MEDICINE

## 2023-04-26 ENCOUNTER — ANESTHESIA (OUTPATIENT)
Dept: SURGERY | Facility: HOSPITAL | Age: 53
End: 2023-04-26
Payer: COMMERCIAL

## 2023-04-26 ENCOUNTER — ANESTHESIA EVENT (OUTPATIENT)
Dept: SURGERY | Facility: HOSPITAL | Age: 53
End: 2023-04-26
Payer: COMMERCIAL

## 2023-04-26 ENCOUNTER — HOSPITAL ENCOUNTER (OUTPATIENT)
Facility: HOSPITAL | Age: 53
Setting detail: HOSPITAL OUTPATIENT SURGERY
Discharge: HOME | End: 2023-04-26
Attending: ORTHOPAEDIC SURGERY | Admitting: ORTHOPAEDIC SURGERY
Payer: COMMERCIAL

## 2023-04-26 ENCOUNTER — TELEPHONE (OUTPATIENT)
Dept: ORTHOPEDICS CLINIC | Facility: CLINIC | Age: 53
End: 2023-04-26

## 2023-04-26 VITALS
DIASTOLIC BLOOD PRESSURE: 73 MMHG | OXYGEN SATURATION: 96 % | WEIGHT: 270 LBS | HEIGHT: 67 IN | SYSTOLIC BLOOD PRESSURE: 121 MMHG | HEART RATE: 80 BPM | RESPIRATION RATE: 13 BRPM | TEMPERATURE: 97 F | BODY MASS INDEX: 42.38 KG/M2

## 2023-04-26 DIAGNOSIS — G56.01 CARPAL TUNNEL SYNDROME OF RIGHT WRIST: ICD-10-CM

## 2023-04-26 DIAGNOSIS — M67.431 GANGLION CYST OF WRIST, RIGHT: ICD-10-CM

## 2023-04-26 DIAGNOSIS — G56.21 CUBITAL TUNNEL SYNDROME ON RIGHT: Primary | ICD-10-CM

## 2023-04-26 PROCEDURE — 01N54ZZ RELEASE MEDIAN NERVE, PERCUTANEOUS ENDOSCOPIC APPROACH: ICD-10-PCS | Performed by: ORTHOPAEDIC SURGERY

## 2023-04-26 PROCEDURE — 0JBG0ZZ EXCISION OF RIGHT LOWER ARM SUBCUTANEOUS TISSUE AND FASCIA, OPEN APPROACH: ICD-10-PCS | Performed by: ORTHOPAEDIC SURGERY

## 2023-04-26 PROCEDURE — 01N40ZZ RELEASE ULNAR NERVE, OPEN APPROACH: ICD-10-PCS | Performed by: ORTHOPAEDIC SURGERY

## 2023-04-26 PROCEDURE — 76942 ECHO GUIDE FOR BIOPSY: CPT | Performed by: ANESTHESIOLOGY

## 2023-04-26 PROCEDURE — 88304 TISSUE EXAM BY PATHOLOGIST: CPT | Performed by: ORTHOPAEDIC SURGERY

## 2023-04-26 PROCEDURE — 0L830ZZ DIVISION OF RIGHT UPPER ARM TENDON, OPEN APPROACH: ICD-10-PCS | Performed by: ORTHOPAEDIC SURGERY

## 2023-04-26 RX ORDER — DEXAMETHASONE SODIUM PHOSPHATE 4 MG/ML
VIAL (ML) INJECTION AS NEEDED
Status: DISCONTINUED | OUTPATIENT
Start: 2023-04-26 | End: 2023-04-26 | Stop reason: SURG

## 2023-04-26 RX ORDER — SODIUM CHLORIDE, SODIUM LACTATE, POTASSIUM CHLORIDE, CALCIUM CHLORIDE 600; 310; 30; 20 MG/100ML; MG/100ML; MG/100ML; MG/100ML
INJECTION, SOLUTION INTRAVENOUS CONTINUOUS
Status: DISCONTINUED | OUTPATIENT
Start: 2023-04-26 | End: 2023-04-26

## 2023-04-26 RX ORDER — HYDROMORPHONE HYDROCHLORIDE 1 MG/ML
0.6 INJECTION, SOLUTION INTRAMUSCULAR; INTRAVENOUS; SUBCUTANEOUS EVERY 5 MIN PRN
Status: DISCONTINUED | OUTPATIENT
Start: 2023-04-26 | End: 2023-04-26

## 2023-04-26 RX ORDER — HYDROMORPHONE HYDROCHLORIDE 1 MG/ML
0.4 INJECTION, SOLUTION INTRAMUSCULAR; INTRAVENOUS; SUBCUTANEOUS EVERY 5 MIN PRN
Status: DISCONTINUED | OUTPATIENT
Start: 2023-04-26 | End: 2023-04-26

## 2023-04-26 RX ORDER — LABETALOL HYDROCHLORIDE 5 MG/ML
5 INJECTION, SOLUTION INTRAVENOUS EVERY 5 MIN PRN
Status: DISCONTINUED | OUTPATIENT
Start: 2023-04-26 | End: 2023-04-26

## 2023-04-26 RX ORDER — HYDROMORPHONE HYDROCHLORIDE 1 MG/ML
0.2 INJECTION, SOLUTION INTRAMUSCULAR; INTRAVENOUS; SUBCUTANEOUS EVERY 5 MIN PRN
Status: DISCONTINUED | OUTPATIENT
Start: 2023-04-26 | End: 2023-04-26

## 2023-04-26 RX ORDER — CEFAZOLIN SODIUM/WATER 2 G/20 ML
SYRINGE (ML) INTRAVENOUS
Status: DISCONTINUED
Start: 2023-04-26 | End: 2023-04-26

## 2023-04-26 RX ORDER — HYDROCODONE BITARTRATE AND ACETAMINOPHEN 5; 325 MG/1; MG/1
2 TABLET ORAL ONCE AS NEEDED
Status: COMPLETED | OUTPATIENT
Start: 2023-04-26 | End: 2023-04-26

## 2023-04-26 RX ORDER — CEFAZOLIN SODIUM/WATER 2 G/20 ML
2 SYRINGE (ML) INTRAVENOUS ONCE
Status: COMPLETED | OUTPATIENT
Start: 2023-04-26 | End: 2023-04-26

## 2023-04-26 RX ORDER — KETOROLAC TROMETHAMINE 30 MG/ML
INJECTION, SOLUTION INTRAMUSCULAR; INTRAVENOUS AS NEEDED
Status: DISCONTINUED | OUTPATIENT
Start: 2023-04-26 | End: 2023-04-26 | Stop reason: SURG

## 2023-04-26 RX ORDER — HYDROCODONE BITARTRATE AND ACETAMINOPHEN 5; 325 MG/1; MG/1
1 TABLET ORAL ONCE AS NEEDED
Status: COMPLETED | OUTPATIENT
Start: 2023-04-26 | End: 2023-04-26

## 2023-04-26 RX ORDER — NALOXONE HYDROCHLORIDE 0.4 MG/ML
80 INJECTION, SOLUTION INTRAMUSCULAR; INTRAVENOUS; SUBCUTANEOUS AS NEEDED
Status: DISCONTINUED | OUTPATIENT
Start: 2023-04-26 | End: 2023-04-26

## 2023-04-26 RX ORDER — ONDANSETRON 2 MG/ML
INJECTION INTRAMUSCULAR; INTRAVENOUS AS NEEDED
Status: DISCONTINUED | OUTPATIENT
Start: 2023-04-26 | End: 2023-04-26 | Stop reason: SURG

## 2023-04-26 RX ORDER — ROPIVACAINE HYDROCHLORIDE 5 MG/ML
INJECTION, SOLUTION EPIDURAL; INFILTRATION; PERINEURAL AS NEEDED
Status: DISCONTINUED | OUTPATIENT
Start: 2023-04-26 | End: 2023-04-26 | Stop reason: SURG

## 2023-04-26 RX ORDER — DEXAMETHASONE SODIUM PHOSPHATE 10 MG/ML
INJECTION, SOLUTION INTRAMUSCULAR; INTRAVENOUS AS NEEDED
Status: DISCONTINUED | OUTPATIENT
Start: 2023-04-26 | End: 2023-04-26 | Stop reason: SURG

## 2023-04-26 RX ORDER — MIDAZOLAM HYDROCHLORIDE 1 MG/ML
1 INJECTION INTRAMUSCULAR; INTRAVENOUS EVERY 5 MIN PRN
Status: DISCONTINUED | OUTPATIENT
Start: 2023-04-26 | End: 2023-04-26

## 2023-04-26 RX ORDER — MIDAZOLAM HYDROCHLORIDE 1 MG/ML
INJECTION INTRAMUSCULAR; INTRAVENOUS AS NEEDED
Status: DISCONTINUED | OUTPATIENT
Start: 2023-04-26 | End: 2023-04-26 | Stop reason: SURG

## 2023-04-26 RX ORDER — PROCHLORPERAZINE EDISYLATE 5 MG/ML
5 INJECTION INTRAMUSCULAR; INTRAVENOUS EVERY 8 HOURS PRN
Status: DISCONTINUED | OUTPATIENT
Start: 2023-04-26 | End: 2023-04-26

## 2023-04-26 RX ORDER — ONDANSETRON 2 MG/ML
4 INJECTION INTRAMUSCULAR; INTRAVENOUS EVERY 6 HOURS PRN
Status: DISCONTINUED | OUTPATIENT
Start: 2023-04-26 | End: 2023-04-26

## 2023-04-26 RX ORDER — SCOLOPAMINE TRANSDERMAL SYSTEM 1 MG/1
1 PATCH, EXTENDED RELEASE TRANSDERMAL ONCE
Status: DISCONTINUED | OUTPATIENT
Start: 2023-04-26 | End: 2023-04-26 | Stop reason: HOSPADM

## 2023-04-26 RX ORDER — OXYCODONE HYDROCHLORIDE AND ACETAMINOPHEN 5; 325 MG/1; MG/1
1-2 TABLET ORAL EVERY 4 HOURS PRN
Qty: 15 TABLET | Refills: 0 | Status: SHIPPED | OUTPATIENT
Start: 2023-04-26

## 2023-04-26 RX ORDER — MEPERIDINE HYDROCHLORIDE 25 MG/ML
12.5 INJECTION INTRAMUSCULAR; INTRAVENOUS; SUBCUTANEOUS AS NEEDED
Status: DISCONTINUED | OUTPATIENT
Start: 2023-04-26 | End: 2023-04-26

## 2023-04-26 RX ORDER — ACETAMINOPHEN 500 MG
1000 TABLET ORAL ONCE
Status: DISCONTINUED | OUTPATIENT
Start: 2023-04-26 | End: 2023-04-26 | Stop reason: HOSPADM

## 2023-04-26 RX ORDER — ACETAMINOPHEN 500 MG
1000 TABLET ORAL ONCE AS NEEDED
Status: COMPLETED | OUTPATIENT
Start: 2023-04-26 | End: 2023-04-26

## 2023-04-26 RX ADMIN — MIDAZOLAM HYDROCHLORIDE 2 MG: 1 INJECTION INTRAMUSCULAR; INTRAVENOUS at 09:29:00

## 2023-04-26 RX ADMIN — ROPIVACAINE HYDROCHLORIDE 30 ML: 5 INJECTION, SOLUTION EPIDURAL; INFILTRATION; PERINEURAL at 09:34:00

## 2023-04-26 RX ADMIN — DEXAMETHASONE SODIUM PHOSPHATE 2 MG: 10 INJECTION, SOLUTION INTRAMUSCULAR; INTRAVENOUS at 09:34:00

## 2023-04-26 RX ADMIN — SODIUM CHLORIDE, SODIUM LACTATE, POTASSIUM CHLORIDE, CALCIUM CHLORIDE: 600; 310; 30; 20 INJECTION, SOLUTION INTRAVENOUS at 09:28:00

## 2023-04-26 RX ADMIN — ONDANSETRON 4 MG: 2 INJECTION INTRAMUSCULAR; INTRAVENOUS at 10:52:00

## 2023-04-26 RX ADMIN — DEXAMETHASONE SODIUM PHOSPHATE 8 MG: 4 MG/ML VIAL (ML) INJECTION at 09:49:00

## 2023-04-26 RX ADMIN — MIDAZOLAM HYDROCHLORIDE 2 MG: 1 INJECTION INTRAMUSCULAR; INTRAVENOUS at 09:28:00

## 2023-04-26 RX ADMIN — SODIUM CHLORIDE, SODIUM LACTATE, POTASSIUM CHLORIDE, CALCIUM CHLORIDE: 600; 310; 30; 20 INJECTION, SOLUTION INTRAVENOUS at 11:34:00

## 2023-04-26 RX ADMIN — CEFAZOLIN SODIUM/WATER 2 G: 2 G/20 ML SYRINGE (ML) INTRAVENOUS at 09:38:00

## 2023-04-26 RX ADMIN — KETOROLAC TROMETHAMINE 30 MG: 30 INJECTION, SOLUTION INTRAMUSCULAR; INTRAVENOUS at 10:56:00

## 2023-04-26 NOTE — BRIEF OP NOTE
Pre-Operative Diagnosis: Carpal tunnel syndrome of right wrist [G56.01]  Cubital tunnel syndrome on right [G56.21]  Ganglion cyst of wrist, right [M67.431]     Post-Operative Diagnosis: Carpal tunnel syndrome of right wrist [G56.01]Cubital tunnel syndrome on right [G56.21]Ganglion cyst of wrist, right [M67.431]      Procedure Performed:   Excision of right volar ganglion cyst, right endoscopic carpal tunnel wrist possible open, and right ulnar nerve transposition    Surgeon(s) and Role:     Ana Harris MD - Primary    Assistant(s):  PA: MEME Mccormick     Surgical Findings: subluxation, hour glass deformity, ganglion cyst     Specimen: ganglion cyst     Estimated Blood Loss: Blood Output: 15 mL (4/26/2023 11:07 AM)      Dictation Number:  None    Broderick Ray MD  4/26/2023  12:45 PM

## 2023-04-26 NOTE — OPERATIVE REPORT
Operative Note    Patient Name: Vicky Saez    Preoperative Diagnosis:     1. Carpal tunnel syndrome of right wrist [G56.01]  2. Cubital tunnel syndrome on right [G56.21]  3. Ganglion cyst of wrist, right [M67.431]    Postoperative Diagnosis:     1. Carpal tunnel syndrome of right wrist [G56.01]  2. Cubital tunnel syndrome on right [G56.21]  3. Ganglion cyst of wrist, right [D72.795]    Surgeon(s) and Role:     Kailey García MD - Primary     Assistant: PA: MEME Huynh     A PA was needed for the successful completion of this case. She was essential for the proper positioning of patient, manipulation of instruments, proper exposure, manipulation of soft tissue, and wound closure. Procedures:     1. Right cubital tunnel release with submuscular transposition with internal neurolysis of  1 FCU nerve branches (CPT 65146)  2. Right flexor pronator lengthening (CPT 49395)  3. Excision of right volar ganglion cyst (53166)    Antibiotics: Ancef 3 g    Surgical Findings: Hourglass deformity of the ulnar nerve just proximal to the FCU heads, subluxation of the ulnar nerve was noted, a volar ganglion cyst emanating off the volar wrist capsule. The FCU branch was left distal to the medial epicondyle    Anesthesia: General    Complications: None    Implants: None    Specimen: None    Condition: Stable    Estimated Blood Loss: 15 mL    Indications:  45year old male with numbness and tingling in the hand refractory to nonsurgical management. EMG/NCv confirmed ulnar nerve neuropathy and right carpal tunnel syndrome he also had a symptomatic volar ganglion cyst that failed nonsurgical management. Patient elected to proceed with surgical excision of the ganglion cyst, as well as a ulnar nerve transposition for his ulnar nerve instability and subsequent ulnar neuropathy and a endoscopic carpal tunnel release.   The risks associated with the procedure, expected  outcome, the expected time to recovery, and the rehab required were reviewed. All of the patient's questions were answered. Procedure:  Patient was met in the preoperative holding area where consent was verified, laterality was marked with the surgeon's initials, and the H&P was updated. Patient was brought to the operating room on a transport cart. Patient was transferred from the transport cart onto the operating room table and placed in a supine position. Antibiotics were fully infused. The arm was prepped and draped in the usual sterile fashion. An sterile upper arm tourniquet was placed. A surgical timeout was performed. 1% lidocaine was used to infiltrate the subcutaneous tissue along the marked incision. The limb was elevated and exsanguinated using Esmarch bandage. Tourniquet was raised to 250 mmHg. A transverse incision through the dermis was made 5mm proximal to the distal volar wrist crease just ulnar to the palmaris longus using a 15 blade. Binu's and Ablan Money scissors was used to dissect through the subcutaneous tissue onto the antebrachial fascia. A distally based 1 cm wide rectangular flap was elevated using a Millington blade. The flap was retracted distally and volarly allowing access to the carpal tunnel. The undersurface of the transverse carpal ligament was accessed, cleaned, and dilated. After assessing the width of the ligament, the microaire endoscopic apparatus with camera was inserted into the carpal tunnel. Under camera magnification with direct visualization of the undersurface of the transverse carpal ligament, the blade was engaged at the distal extents of the ligament and the release of the ligament was carried out distal to proximal.  I verified the release with the endoscopy camera noting divergent edges of the transverse carpal ligament. I also physically verified with a tenosynovium elevator complete release of the transverse carpal ligament.     A 15 blade was used to make an incision longitudinally across the volar aspect of the wrist over the the mass. The incision was carried just through the dermis. Rimma Baezness scissors was used to dissect through the subcutaneous tissue onto the superficial surface of the ganglion cyst.  The radial artery and its 2 venae comitantes were identified and dissected off of the ganglion cyst.  The remaining soft tissues were carefully dissected off of the ganglion cyst.  Circumferential dissection was then taken down to the origin of the cyst.  The stalk of the cyst was emanating off of the volar wrist capsule. The cyst was excised in its entirety with the stalk off the capsule and sent to pathology in formalin. The tourniquet was let down. Bipolar cautery was used to achieve hemostasis. The radial artery was noted prior to closure to be intact and pulsating normally. The wound was irrigated with sterile saline. A 15 blade was used to make an incision through the dermis. Tameraon Velázquez scissors were used to dissect through the subcutaneous tissue using bipolar cautery to cauterize any small crossing vessels. Medial antebrachial cutaneous nerve was found roughly 3-1/2 cm distal to the medial epicondyle. This cutaneous nerve was then protected for the remainder of the case. The dissection was meticulously carried down to the Valenzuela's fascia. The ulnar nerve was found just proximal to Valenzuela's fascia along the medial aspect of the triceps. Valenzuela's fascia was divided and the decompression was carried distally to the 2 FCU heads. The fascia overlying the FCU muscle was carefully  off of the FCU muscle underneath and then divided. Motor branches from the ulnar nerve to the FCU muscle were visualized and protected. The FCU muscle belly was carefully spread to decompress the nerve distally. Bipolar cautery was used to create a full-thickness anterior flap in order to identify the flexor pronator muscle/fascia.   The intermuscular septum was then identified proximally and excised. 2 long fascial flaps were made from the flexor pronator fascia 1 proximally based more distally based. The fascial flap was sharply elevated off of the muscle. The deep flexor pronator aponeurosis was identified and the muscle overlying it was released from the proximal to distal fashion to maintain its innervation. The fascia was then sharply elevated off the muscle underneath it preserving the muscle fibers. Fascial elements anterior to the FCU (medial epicondyle head) was identified and excised. The FCU muscle was released off of the epicondyle from a proximal to distal fashion to maintain muscle innervation. The soft tissue attachments of the ulnar nerve were circumferentially released to allow total mobilization of the ulnar nerve. Neurolysis of the posterior branch of the ulnar nerve going to the FCU muscle was performed in order to facilitate transposition. The posterior branch was left distal to the medial epicondyle once the ulnar nerve was transposed anterior to the medial epicondyle, the fascial flaps were repaired using 0 Vicryl. The ulnar nerve was traced proximal to distal evaluating for any kink points/compression points. No further points of compression or kinking was noted. The elbow was taken through range of motion and no nerve subluxation was noted and no compression of the nerve was noted. The tourniquet was let down and hemostasis was achieved with pressure and bipolar cautery. Once hemostasis was achieved, the wound was irrigated with sterile saline prior to closure. A 10 Fijian fully fluted drain was placed in the subcutaneous tissue and exited just distal to the incision site. Closure:  3-0 Vicryl was used in the buried interrupted fashion to reapproximate the subcutaneous tissue. 3-0 Monocryl was used to reapproximate the skin edges. 4-0 Monocryl was run in a subcuticular fashion.   Skin glue and Steri-Strips were applied    3-0 Monocryl was used to reapproximate the skin edge for the ganglion cyst excision. 4 Monocryl was run in a subcuticular fashion. A 5-0 Monocryl was used to reapproximate the endoscopic carpal tunnel release incision. Skin glue and Steri-Strips were applied. Dressing/Splint:  Tegaderm with a nonadherent pad was applied to the incision. The elbow was wrapped in an Ace wrap. The patient's arm was then placed in a sling. Post Operative:  Patient was woken up from anesthesia and taken to PACU for further recovery and discharge home. Harsh Flores MD  Hand, Wrist, & Elbow Surgery  [facility]  [facility] [address], [address] [facility], [address] [place] [address]. [facility]  [email or web address]  t: [id number]  f: [phone]

## 2023-04-26 NOTE — ANESTHESIA PROCEDURE NOTES
Airway  Date/Time: 4/26/2023 9:36 AM  Urgency: elective    Airway not difficult    General Information and Staff    Patient location during procedure: OR  Anesthesiologist: Haylee Bell MD  Performed: anesthesiologist   Performed by: Haylee Bell MD  Authorized by: Haylee Bell MD      Indications and Patient Condition  Indications for airway management: anesthesia  Sedation level: deep  Preoxygenated: yes  Patient position: sniffing  Mask difficulty assessment: 1 - vent by mask    Final Airway Details  Final airway type: supraglottic airway      Successful airway: classic  Size 4       Number of attempts at approach: 1

## 2023-04-26 NOTE — ANESTHESIA PROCEDURE NOTES
Regional Block    Date/Time: 4/26/2023 9:28 AM    Performed by: Sergey Reeves MD  Authorized by: Sergey Reeves MD      General Information and Staff    Start Time:  4/26/2023 9:28 AM  End Time:  4/26/2023 9:34 AM  Anesthesiologist:  Sergey Reeves MD  Performed by: Anesthesiologist  Patient Location:  OR    Block Placement: Pre Induction  Site Identification: real time ultrasound guided, nerve stimulator and image stored and retrievable    Block site/laterality marked before start: site marked  Reason for Block: at surgeon's request and post-op pain management    Preanesthetic Checklist: 2 patient identifers, IV checked, site marked, risks and benefits discussed, monitors and equipment checked, pre-op evaluation, timeout performed, anesthesia consent, sterile technique used, no prohibitive neurological deficits and no local skin infection at insertion site      Procedure Details    Patient Position:  Supine  Prep: ChloraPrep    Monitoring:  Cardiac monitor, continuous pulse ox and blood pressure cuff  Block Type: Interscalene  Laterality:  Right  Injection Technique:  Single-shot    Needle    Needle Type:  Short-bevel and echogenic  Needle Gauge:  21 G  Needle Localization:  Ultrasound guidance and nerve stimulator  Reason for Ultrasound Use: appropriate spread of the medication was noted in real time and no ultrasound evidence of intravascular and/or intraneural injection    Nerve Stimulator: 0.4 amps  Muscle Twitch Response: biceps response      Assessment    Injection Assessment:  Good spread noted, negative resistance, negative aspiration for heme, incremental injection and low pressure  Heart Rate Change: No    - Patient tolerated block procedure well without evidence of immediate block related complications.      Medications  4/26/2023 9:28 AM      Additional Comments    Medication:  Ropivacaine 0.5% 30mL + Decadron PF 2mg

## 2023-04-26 NOTE — TELEPHONE ENCOUNTER
Future Appointments   Date Time Provider [facility]   4/27/2023  4:00 PM Alvin German MD [facility]   5/11/2023  9:00 AM MEME Vidal [facility]   12/6/2023  9:00 AM Kriste Eisenmenger, DO [facility] [address] [address]

## 2023-04-26 NOTE — DISCHARGE INSTRUCTIONS
Dressing: Keep dressing on until follow up. Keep dressing clean clean/dry/intact. Weight Bearing: No lifting greater than 1lbs with hand. Activity: Come out of sling 2-3 times per day to work gentle elbow range of motion. Ice and elevate to help minimize swelling. Pain: Take acetaminophen and ibuprofen (if you can) for pain control. Take Percocet for breakthrough pain. Drain: Check to make sure bulb is compressed down to maintain suction. When bulb is full, empty and record amount of fluid emptied. Call for follow up appointment if you don't have one.        HOW TO EMPTY YOUR DRAIN    Wash hands with soap and water  Hold drain so plug is upright and release it  Turn drain upside down into measuring container and squeeze drain until all the liquid is removed  While squeezing the drain, replace the plug into drain  Measure the liquid and record the amount in your diary twice a day  Be sure to bring your diary to your next visit with the doctor          Drain Record Sheet    Date Time Drain #1 Drain #2                                                                                            You received a drug called Toradol which is an Anti Inflammatory at: 11:00am  If you are allowed to take Anti inflammatories:    Do not take any Anti Inflammatory like Motrin, Aleve or Ibuprofen until after: 5:00pm  Please report any suspected allergic reactions or bleeding issues to your doctor

## 2023-04-26 NOTE — ANESTHESIA POSTPROCEDURE EVALUATION
[facility]    Sigurd Severin Patient Status:  Hospital Outpatient Surgery   Age/Gender 45year old male MRN [id number]   [facility] SURGERY Attending Elizabeth Guzman MD   Hosp Day # 0 PCP Fitz Will DO       Anesthesia Post-op Note    Excision of right volar ganglion cyst, right endoscopic carpal tunnel wrist possible open, and right ulnar nerve transposition    Procedure Summary     Date: 04/26/23 Room / Location: [facility] MAIN OR 12 / 1404 [facility] OR    Anesthesia Start: 6928 Anesthesia Stop: 1134    Procedures:       Excision of right volar ganglion cyst, right endoscopic carpal tunnel wrist possible open, and right ulnar nerve transposition (Right: Lower Arm)      ULNAR NERVE TRANSPOSITION (Right) Diagnosis:       Carpal tunnel syndrome of right wrist      Cubital tunnel syndrome on right      Ganglion cyst of wrist, right      (Carpal tunnel syndrome of right wrist [G56.01]Cubital tunnel syndrome on right [G56.21]Ganglion cyst of wrist, right [U39.999])    Surgeons: Elizabeth Guzman MD Anesthesiologist: Sai Garcia MD    Anesthesia Type: general ASA Status: 3          Anesthesia Type: general    Vitals Value Taken Time   /91 04/26/23 1137   Temp 97.4 04/26/23 1137   Pulse 85 04/26/23 1137   Resp 16 04/26/23 1137   SpO2 95 04/26/23 1137       Patient Location: PACU    Anesthesia Type: general    Airway Patency: patent    Postop Pain Control: adequate    Mental Status: mildly sedated but able to meaningfully participate in the post-anesthesia evaluation    Nausea/Vomiting: none    Cardiopulmonary/Hydration status: stable euvolemic    Complications: no apparent anesthesia related complications    Postop vital signs: stable    Dental Exam: Unchanged from Preop

## 2023-04-27 ENCOUNTER — OFFICE VISIT (OUTPATIENT)
Dept: ORTHOPEDICS CLINIC | Facility: CLINIC | Age: 53
End: 2023-04-27
Payer: COMMERCIAL

## 2023-04-27 VITALS — HEIGHT: 67 IN | WEIGHT: 270 LBS | BODY MASS INDEX: 42.38 KG/M2

## 2023-04-27 DIAGNOSIS — G56.01 CARPAL TUNNEL SYNDROME OF RIGHT WRIST: Primary | ICD-10-CM

## 2023-04-27 DIAGNOSIS — G56.21 CUBITAL TUNNEL SYNDROME ON RIGHT: ICD-10-CM

## 2023-04-27 PROCEDURE — 3008F BODY MASS INDEX DOCD: CPT | Performed by: ORTHOPAEDIC SURGERY

## 2023-04-27 PROCEDURE — 99024 POSTOP FOLLOW-UP VISIT: CPT | Performed by: ORTHOPAEDIC SURGERY

## 2023-04-27 RX ORDER — OXYCODONE HYDROCHLORIDE AND ACETAMINOPHEN 5; 325 MG/1; MG/1
1 TABLET ORAL EVERY 4 HOURS PRN
Qty: 20 TABLET | Refills: 0 | Status: SHIPPED | OUTPATIENT
Start: 2023-04-27

## 2023-04-27 NOTE — PROGRESS NOTES
Patient reports the nerve block is starting to wear off. He has still some numbness and tingling in his fingers but is able to better control his arm. He has moderate amounts of pain. More Percocet was prescribed as he has been needing 2 pills every 4 hours to manage his pain. The drain was successfully removed. There was minimal output between this morning and in 4 PM.  Patient will follow-up in about 10 days for repeat evaluation.

## 2023-05-11 ENCOUNTER — OFFICE VISIT (OUTPATIENT)
Dept: FAMILY MEDICINE CLINIC | Facility: CLINIC | Age: 53
End: 2023-05-11
Payer: COMMERCIAL

## 2023-05-11 ENCOUNTER — OFFICE VISIT (OUTPATIENT)
Dept: ORTHOPEDICS CLINIC | Facility: CLINIC | Age: 53
End: 2023-05-11
Payer: COMMERCIAL

## 2023-05-11 VITALS
HEIGHT: 67 IN | RESPIRATION RATE: 16 BRPM | BODY MASS INDEX: 42.38 KG/M2 | HEART RATE: 81 BPM | SYSTOLIC BLOOD PRESSURE: 128 MMHG | WEIGHT: 270 LBS | DIASTOLIC BLOOD PRESSURE: 79 MMHG | TEMPERATURE: 98 F | OXYGEN SATURATION: 95 %

## 2023-05-11 VITALS — HEIGHT: 67 IN | WEIGHT: 270 LBS | BODY MASS INDEX: 42.38 KG/M2

## 2023-05-11 DIAGNOSIS — G56.21 CUBITAL TUNNEL SYNDROME ON RIGHT: ICD-10-CM

## 2023-05-11 DIAGNOSIS — H66.003 NON-RECURRENT ACUTE SUPPURATIVE OTITIS MEDIA OF BOTH EARS WITHOUT SPONTANEOUS RUPTURE OF TYMPANIC MEMBRANES: Primary | ICD-10-CM

## 2023-05-11 DIAGNOSIS — G56.01 CARPAL TUNNEL SYNDROME OF RIGHT WRIST: Primary | ICD-10-CM

## 2023-05-11 PROCEDURE — 99024 POSTOP FOLLOW-UP VISIT: CPT | Performed by: PHYSICIAN ASSISTANT

## 2023-05-11 PROCEDURE — 3008F BODY MASS INDEX DOCD: CPT | Performed by: NURSE PRACTITIONER

## 2023-05-11 PROCEDURE — 3074F SYST BP LT 130 MM HG: CPT | Performed by: NURSE PRACTITIONER

## 2023-05-11 PROCEDURE — 3078F DIAST BP <80 MM HG: CPT | Performed by: NURSE PRACTITIONER

## 2023-05-11 PROCEDURE — 3008F BODY MASS INDEX DOCD: CPT | Performed by: PHYSICIAN ASSISTANT

## 2023-05-11 PROCEDURE — 99213 OFFICE O/P EST LOW 20 MIN: CPT | Performed by: NURSE PRACTITIONER

## 2023-05-11 RX ORDER — OFLOXACIN 3 MG/ML
10 SOLUTION AURICULAR (OTIC) 2 TIMES DAILY
Qty: 10 ML | Refills: 0 | Status: SHIPPED | OUTPATIENT
Start: 2023-05-11 | End: 2023-05-21

## 2023-05-11 RX ORDER — AMOXICILLIN AND CLAVULANATE POTASSIUM 875; 125 MG/1; MG/1
1 TABLET, FILM COATED ORAL 2 TIMES DAILY
Qty: 20 TABLET | Refills: 0 | Status: SHIPPED | OUTPATIENT
Start: 2023-05-11 | End: 2023-05-21

## 2023-06-08 ENCOUNTER — OFFICE VISIT (OUTPATIENT)
Dept: ORTHOPEDICS CLINIC | Facility: CLINIC | Age: 53
End: 2023-06-08
Payer: COMMERCIAL

## 2023-06-08 DIAGNOSIS — G56.01 CARPAL TUNNEL SYNDROME OF RIGHT WRIST: Primary | ICD-10-CM

## 2023-06-08 DIAGNOSIS — G56.21 CUBITAL TUNNEL SYNDROME ON RIGHT: ICD-10-CM

## 2023-06-08 PROCEDURE — 99024 POSTOP FOLLOW-UP VISIT: CPT | Performed by: PHYSICIAN ASSISTANT

## 2023-07-12 DIAGNOSIS — I10 ESSENTIAL HYPERTENSION: ICD-10-CM

## 2023-07-12 RX ORDER — LOSARTAN POTASSIUM 100 MG/1
100 TABLET ORAL DAILY
Qty: 90 TABLET | Refills: 0 | Status: SHIPPED | OUTPATIENT
Start: 2023-07-12

## 2023-07-12 NOTE — TELEPHONE ENCOUNTER
A refill request was received for:  Requested Prescriptions     Pending Prescriptions Disp Refills    SPIRONOLACTONE 50 MG Oral Tab [Pharmacy Med Name: SPIRONOLACTONE 50MG TABLETS] 90 tablet 0     Sig: TAKE 1 TABLET(50 MG) BY MOUTH DAILY    LOSARTAN 100 MG Oral Tab [Pharmacy Med Name: LOSARTAN 100MG TABLETS] 90 tablet 0     Sig: TAKE 1 TABLET(100 MG) BY MOUTH DAILY       Last refill date:   03/30/2023    Last office visit: 04/25/2023    Follow up due:  Future Appointments   Date Time Provider [facility]   7/27/2023 10:00 AM Denise Reynoso MD [facility]   12/6/2023  9:00 AM Dandy Williamson DO [facility] [address]

## 2023-07-13 RX ORDER — LOSARTAN POTASSIUM 100 MG/1
100 TABLET ORAL DAILY
Qty: 90 TABLET | Refills: 0 | Status: SHIPPED | OUTPATIENT
Start: 2023-07-13

## 2023-07-13 RX ORDER — SPIRONOLACTONE 50 MG/1
TABLET, FILM COATED ORAL
Qty: 90 TABLET | Refills: 0 | Status: SHIPPED | OUTPATIENT
Start: 2023-07-13

## 2023-07-17 RX ORDER — ATORVASTATIN CALCIUM 10 MG/1
10 TABLET, FILM COATED ORAL DAILY
Qty: 90 TABLET | Refills: 0 | Status: SHIPPED | OUTPATIENT
Start: 2023-07-17

## 2023-07-17 NOTE — TELEPHONE ENCOUNTER
A refill request was received for:  Requested Prescriptions     Pending Prescriptions Disp Refills    atorvastatin 10 MG Oral Tab 90 tablet 0     Sig: Take 1 tablet (10 mg total) by mouth daily. Component  Ref Range & Units 12/1/22 10:45 AM   CHOLESTEROL, TOTAL  <200 mg/dL 148   HDL CHOLESTEROL  > OR = 40 mg/dL 59   TRIGLYCERIDES  <150 mg/dL 121   LDL-CHOLESTEROL  mg/dL (calc) 69   Comment: Reference range: <100     Desirable range <100 mg/dL for primary prevention;    <70 mg/dL for patients with CHD or diabetic patients  with > or = 2 CHD risk factors. LDL-C is now calculated using the Elie-Mckee  calculation, which is a validated novel method providing  better accuracy than the Friedewald equation in the  estimation of LDL-C. Yolanda Mays et al. [facility]. 6464;798(22): 1457-3536  (http://education. [employer]. com/faq/KTD641)   CHOL/HDLC RATIO  <5.0 (calc) 2.5   NON-HDL CHOLESTEROL  <130 mg/dL (calc) 89          Last refill date:12/2/2022       Last office visit:4/25/2023     Follow up due:  Future Appointments   Date Time Provider [facility]   7/27/2023 10:00 AM Norberto Maier MD [facility]   12/6/2023  9:00 AM Ranjana Bauman DO [facility] [address]

## 2023-07-27 ENCOUNTER — OFFICE VISIT (OUTPATIENT)
Dept: ORTHOPEDICS CLINIC | Facility: CLINIC | Age: 53
End: 2023-07-27
Payer: COMMERCIAL

## 2023-07-27 VITALS — HEIGHT: 67 IN | WEIGHT: 270 LBS | BODY MASS INDEX: 42.38 KG/M2

## 2023-07-27 DIAGNOSIS — G56.01 CARPAL TUNNEL SYNDROME OF RIGHT WRIST: Primary | ICD-10-CM

## 2023-07-27 PROCEDURE — 3008F BODY MASS INDEX DOCD: CPT | Performed by: ORTHOPAEDIC SURGERY

## 2023-07-27 PROCEDURE — 99024 POSTOP FOLLOW-UP VISIT: CPT | Performed by: ORTHOPAEDIC SURGERY

## 2023-09-22 DIAGNOSIS — M17.11 PRIMARY OSTEOARTHRITIS OF RIGHT KNEE: ICD-10-CM

## 2023-09-22 NOTE — TELEPHONE ENCOUNTER
A refill request was received for:  Requested Prescriptions     Pending Prescriptions Disp Refills    DICLOFENAC 75 MG Oral Tab EC [Pharmacy Med Name: DICLOFENAC SODIUM 75MG DR TABLETS] 60 tablet 1     Sig: TAKE 1 TABLET(75 MG) BY MOUTH TWICE DAILY AS NEEDED       Last refill date:4/4/2023       Last office visit: 4/25/2023    Follow up due:  Future Appointments   Date Time Provider [facility]   12/6/2023  9:00 AM Shasha Hill DO [facility]

## 2023-09-23 RX ORDER — DICLOFENAC SODIUM 75 MG/1
75 TABLET, DELAYED RELEASE ORAL 2 TIMES DAILY PRN
Qty: 60 TABLET | Refills: 1 | Status: SHIPPED | OUTPATIENT
Start: 2023-09-23

## 2023-10-03 ENCOUNTER — HOSPITAL ENCOUNTER (OUTPATIENT)
Age: 53
Discharge: HOME | End: 2023-10-03

## 2023-10-03 VITALS
HEART RATE: 96 BPM | DIASTOLIC BLOOD PRESSURE: 64 MMHG | RESPIRATION RATE: 20 BRPM | SYSTOLIC BLOOD PRESSURE: 115 MMHG | TEMPERATURE: 99 F | OXYGEN SATURATION: 100 %

## 2023-10-03 DIAGNOSIS — J01.00 ACUTE MAXILLARY SINUSITIS, RECURRENCE NOT SPECIFIED: Primary | ICD-10-CM

## 2023-10-03 PROCEDURE — 99203 OFFICE O/P NEW LOW 30 MIN: CPT

## 2023-10-03 PROCEDURE — 99213 OFFICE O/P EST LOW 20 MIN: CPT

## 2023-10-03 RX ORDER — AMOXICILLIN AND CLAVULANATE POTASSIUM 875; 125 MG/1; MG/1
1 TABLET, FILM COATED ORAL 2 TIMES DAILY
Qty: 20 TABLET | Refills: 0 | Status: SHIPPED | OUTPATIENT
Start: 2023-10-03 | End: 2023-10-13

## 2023-10-12 ENCOUNTER — HOSPITAL ENCOUNTER (EMERGENCY)
Age: 53
Discharge: HOME | End: 2023-10-12
Attending: EMERGENCY MEDICINE
Payer: COMMERCIAL

## 2023-10-12 ENCOUNTER — APPOINTMENT (OUTPATIENT)
Dept: GENERAL RADIOLOGY | Age: 53
End: 2023-10-12
Attending: EMERGENCY MEDICINE
Payer: COMMERCIAL

## 2023-10-12 VITALS
OXYGEN SATURATION: 98 % | TEMPERATURE: 98 F | HEART RATE: 86 BPM | SYSTOLIC BLOOD PRESSURE: 150 MMHG | HEIGHT: 67 IN | BODY MASS INDEX: 40.81 KG/M2 | RESPIRATION RATE: 16 BRPM | WEIGHT: 260 LBS | DIASTOLIC BLOOD PRESSURE: 96 MMHG

## 2023-10-12 DIAGNOSIS — J98.01 ACUTE BRONCHOSPASM: ICD-10-CM

## 2023-10-12 DIAGNOSIS — J01.91 ACUTE RECURRENT SINUSITIS, UNSPECIFIED LOCATION: Primary | ICD-10-CM

## 2023-10-12 DIAGNOSIS — J40 BRONCHITIS: ICD-10-CM

## 2023-10-12 LAB
POCT INFLUENZA A: NEGATIVE
POCT INFLUENZA B: NEGATIVE
SARS-COV-2 RNA RESP QL NAA+PROBE: NOT DETECTED

## 2023-10-12 PROCEDURE — 87502 INFLUENZA DNA AMP PROBE: CPT

## 2023-10-12 PROCEDURE — 71046 X-RAY EXAM CHEST 2 VIEWS: CPT | Performed by: EMERGENCY MEDICINE

## 2023-10-12 PROCEDURE — 87502 INFLUENZA DNA AMP PROBE: CPT | Performed by: EMERGENCY MEDICINE

## 2023-10-12 PROCEDURE — 94664 DEMO&/EVAL PT USE INHALER: CPT

## 2023-10-12 PROCEDURE — 99284 EMERGENCY DEPT VISIT MOD MDM: CPT

## 2023-10-12 RX ORDER — CETIRIZINE HYDROCHLORIDE 10 MG/1
10 TABLET ORAL DAILY
Qty: 30 TABLET | Refills: 0 | Status: SHIPPED | OUTPATIENT
Start: 2023-10-12 | End: 2023-11-11

## 2023-10-12 RX ORDER — ALBUTEROL SULFATE 90 UG/1
8 AEROSOL, METERED RESPIRATORY (INHALATION) ONCE
Status: COMPLETED | OUTPATIENT
Start: 2023-10-12 | End: 2023-10-12

## 2023-10-12 RX ORDER — DOXYCYCLINE HYCLATE 100 MG/1
100 CAPSULE ORAL 2 TIMES DAILY
Qty: 14 CAPSULE | Refills: 0 | Status: SHIPPED | OUTPATIENT
Start: 2023-10-12 | End: 2023-10-19

## 2023-10-12 RX ORDER — FLUTICASONE PROPIONATE 50 MCG
2 SPRAY, SUSPENSION (ML) NASAL DAILY
Qty: 16 G | Refills: 0 | Status: SHIPPED | OUTPATIENT
Start: 2023-10-12 | End: 2023-11-11

## 2023-10-12 NOTE — ED INITIAL ASSESSMENT (HPI)
Pt reports sinus congestion for the past 4 wks finished augmentin yesterday and states in the past few days he feels worse with chest congestion, productive cough and ear drainage.

## 2023-10-12 NOTE — DISCHARGE INSTRUCTIONS
May use inhaler every 4 hours as needed to help with mobilizing secretions and improving breathing. Use Afrin for the next 3 days. Wait for 30 minutes after using Afrin and then use your Flonase. After 3 days just use the Flonase. Stay well-hydrated. Return for any sudden worsening.

## 2023-11-15 RX ORDER — ATORVASTATIN CALCIUM 10 MG/1
10 TABLET, FILM COATED ORAL DAILY
Qty: 90 TABLET | Refills: 0 | Status: SHIPPED | OUTPATIENT
Start: 2023-11-15

## 2023-11-15 RX ORDER — SPIRONOLACTONE 50 MG/1
50 TABLET, FILM COATED ORAL DAILY
Qty: 90 TABLET | Refills: 0 | Status: SHIPPED | OUTPATIENT
Start: 2023-11-15

## 2023-11-15 NOTE — TELEPHONE ENCOUNTER
A refill request was received for:  Requested Prescriptions     Pending Prescriptions Disp Refills    ATORVASTATIN 10 MG Oral Tab [Pharmacy Med Name: ATORVASTATIN 10MG TABLETS] 90 tablet 0     Sig: TAKE 1 TABLET(10 MG) BY MOUTH DAILY     Component  Ref Range & Units 12/1/22 10:45 AM   CHOLESTEROL, TOTAL  <200 mg/dL 148   HDL CHOLESTEROL  > OR = 40 mg/dL 59   TRIGLYCERIDES  <150 mg/dL 121   LDL-CHOLESTEROL  mg/dL (calc) 69   Comment: Reference range: <100     Desirable range <100 mg/dL for primary prevention;    <70 mg/dL for patients with CHD or diabetic patients  with > or = 2 CHD risk factors. LDL-C is now calculated using the Elie-Mckee  calculation, which is a validated novel method providing  better accuracy than the Friedewald equation in the  estimation of LDL-C. Sayra Resendez al. [facility]. 8987;305(46): 6501-3872  (http://education. [employer]. com/faq/YPY048)   CHOL/HDLC RATIO  <5.0 (calc) 2.5   NON-HDL CHOLESTEROL  <130 mg/dL (calc) 89     Last refill date:7/17/2023       Last office visit: 4/25/2023    Follow up due:  Future Appointments   Date Time Provider Gray [place]   12/6/2023  9:00 AM DO NIKITA Boyd [facility] [address] [address]

## 2023-12-06 ENCOUNTER — OFFICE VISIT (OUTPATIENT)
Dept: FAMILY MEDICINE CLINIC | Facility: CLINIC | Age: 53
End: 2023-12-06
Payer: COMMERCIAL

## 2023-12-06 ENCOUNTER — EKG ENCOUNTER (OUTPATIENT)
Dept: LAB | Age: 53
End: 2023-12-06
Attending: FAMILY MEDICINE
Payer: COMMERCIAL

## 2023-12-06 ENCOUNTER — LAB ENCOUNTER (OUTPATIENT)
Dept: LAB | Age: 53
End: 2023-12-06
Attending: FAMILY MEDICINE
Payer: COMMERCIAL

## 2023-12-06 VITALS
WEIGHT: 286 LBS | HEART RATE: 79 BPM | BODY MASS INDEX: 44.89 KG/M2 | RESPIRATION RATE: 16 BRPM | OXYGEN SATURATION: 98 % | HEIGHT: 67 IN

## 2023-12-06 DIAGNOSIS — Z87.891 FORMER TOBACCO USE: ICD-10-CM

## 2023-12-06 DIAGNOSIS — Z00.00 ROUTINE GENERAL MEDICAL EXAMINATION AT A HEALTH CARE FACILITY: ICD-10-CM

## 2023-12-06 DIAGNOSIS — Z79.899 MEDICATION MANAGEMENT: ICD-10-CM

## 2023-12-06 DIAGNOSIS — Z85.6 HISTORY OF LEUKEMIA: ICD-10-CM

## 2023-12-06 DIAGNOSIS — Z00.00 ROUTINE GENERAL MEDICAL EXAMINATION AT A HEALTH CARE FACILITY: Primary | ICD-10-CM

## 2023-12-06 DIAGNOSIS — G47.33 OSA (OBSTRUCTIVE SLEEP APNEA): ICD-10-CM

## 2023-12-06 DIAGNOSIS — Z98.84 LAP-BAND SURGERY STATUS: ICD-10-CM

## 2023-12-06 DIAGNOSIS — Z86.010 HISTORY OF ADENOMATOUS POLYP OF COLON: ICD-10-CM

## 2023-12-06 DIAGNOSIS — Z85.820 HISTORY OF MELANOMA: ICD-10-CM

## 2023-12-06 DIAGNOSIS — Z00.00 ANNUAL PHYSICAL EXAM: Primary | ICD-10-CM

## 2023-12-06 LAB
ALBUMIN SERPL-MCNC: 3.6 G/DL (ref 3.4–5)
ALBUMIN/GLOB SERPL: 1.1 {RATIO} (ref 1–2)
ALP LIVER SERPL-CCNC: 81 U/L
ALT SERPL-CCNC: 28 U/L
ANION GAP SERPL CALC-SCNC: 2 MMOL/L (ref 0–18)
AST SERPL-CCNC: 14 U/L (ref 15–37)
ATRIAL RATE: 80 BPM
BASOPHILS # BLD AUTO: 0.08 X10(3) UL (ref 0–0.2)
BASOPHILS NFR BLD AUTO: 0.9 %
BILIRUB SERPL-MCNC: 0.3 MG/DL (ref 0.1–2)
BUN BLD-MCNC: 20 MG/DL (ref 9–23)
CALCIUM BLD-MCNC: 8.9 MG/DL (ref 8.5–10.1)
CHLORIDE SERPL-SCNC: 113 MMOL/L (ref 98–112)
CHOLEST SERPL-MCNC: 142 MG/DL (ref ?–200)
CO2 SERPL-SCNC: 27 MMOL/L (ref 21–32)
CREAT BLD-MCNC: 1.16 MG/DL
DEPRECATED HBV CORE AB SER IA-ACNC: 155 NG/ML
EGFRCR SERPLBLD CKD-EPI 2021: 75 ML/MIN/1.73M2 (ref 60–?)
EOSINOPHIL # BLD AUTO: 0.21 X10(3) UL (ref 0–0.7)
EOSINOPHIL NFR BLD AUTO: 2.4 %
ERYTHROCYTE [DISTWIDTH] IN BLOOD BY AUTOMATED COUNT: 13.8 %
EST. AVERAGE GLUCOSE BLD GHB EST-MCNC: 114 MG/DL (ref 68–126)
FASTING PATIENT LIPID ANSWER: YES
FASTING STATUS PATIENT QL REPORTED: YES
GLOBULIN PLAS-MCNC: 3.2 G/DL (ref 2.8–4.4)
GLUCOSE BLD-MCNC: 104 MG/DL (ref 70–99)
HBA1C MFR BLD: 5.6 % (ref ?–5.7)
HCT VFR BLD AUTO: 35.4 %
HDLC SERPL-MCNC: 50 MG/DL (ref 40–59)
HGB BLD-MCNC: 11.3 G/DL
IMM GRANULOCYTES # BLD AUTO: 0.04 X10(3) UL (ref 0–1)
IMM GRANULOCYTES NFR BLD: 0.4 %
IRON SATN MFR SERPL: 16 %
IRON SERPL-MCNC: 52 UG/DL
LDLC SERPL CALC-MCNC: 75 MG/DL (ref ?–100)
LYMPHOCYTES # BLD AUTO: 1.99 X10(3) UL (ref 1–4)
LYMPHOCYTES NFR BLD AUTO: 22.3 %
MCH RBC QN AUTO: 30.6 PG (ref 26–34)
MCHC RBC AUTO-ENTMCNC: 31.9 G/DL (ref 31–37)
MCV RBC AUTO: 95.9 FL
MONOCYTES # BLD AUTO: 1 X10(3) UL (ref 0.1–1)
MONOCYTES NFR BLD AUTO: 11.2 %
NEUTROPHILS # BLD AUTO: 5.61 X10 (3) UL (ref 1.5–7.7)
NEUTROPHILS # BLD AUTO: 5.61 X10(3) UL (ref 1.5–7.7)
NEUTROPHILS NFR BLD AUTO: 62.8 %
NONHDLC SERPL-MCNC: 92 MG/DL (ref ?–130)
OSMOLALITY SERPL CALC.SUM OF ELEC: 297 MOSM/KG (ref 275–295)
P AXIS: 47 DEGREES
P-R INTERVAL: 162 MS
PLATELET # BLD AUTO: 164 10(3)UL (ref 150–450)
POTASSIUM SERPL-SCNC: 4.8 MMOL/L (ref 3.5–5.1)
PROT SERPL-MCNC: 6.8 G/DL (ref 6.4–8.2)
PSA SERPL-MCNC: 0.85 NG/ML (ref ?–4)
Q-T INTERVAL: 394 MS
QRS DURATION: 96 MS
QTC CALCULATION (BEZET): 454 MS
R AXIS: 31 DEGREES
RBC # BLD AUTO: 3.69 X10(6)UL
SODIUM SERPL-SCNC: 142 MMOL/L (ref 136–145)
T AXIS: 21 DEGREES
T4 FREE SERPL-MCNC: 1 NG/DL (ref 0.8–1.7)
TIBC SERPL-MCNC: 332 UG/DL (ref 240–450)
TRANSFERRIN SERPL-MCNC: 223 MG/DL (ref 200–360)
TRIGL SERPL-MCNC: 90 MG/DL (ref 30–149)
TSI SER-ACNC: 1.04 MIU/ML (ref 0.36–3.74)
VENTRICULAR RATE: 80 BPM
VIT B12 SERPL-MCNC: 741 PG/ML (ref 193–986)
VIT D+METAB SERPL-MCNC: 41.9 NG/ML (ref 30–100)
VLDLC SERPL CALC-MCNC: 14 MG/DL (ref 0–30)
WBC # BLD AUTO: 8.9 X10(3) UL (ref 4–11)

## 2023-12-06 PROCEDURE — 82607 VITAMIN B-12: CPT | Performed by: FAMILY MEDICINE

## 2023-12-06 PROCEDURE — 84153 ASSAY OF PSA TOTAL: CPT | Performed by: FAMILY MEDICINE

## 2023-12-06 PROCEDURE — 93005 ELECTROCARDIOGRAM TRACING: CPT

## 2023-12-06 PROCEDURE — 36415 COLL VENOUS BLD VENIPUNCTURE: CPT | Performed by: FAMILY MEDICINE

## 2023-12-06 PROCEDURE — 99396 PREV VISIT EST AGE 40-64: CPT | Performed by: FAMILY MEDICINE

## 2023-12-06 PROCEDURE — 80053 COMPREHEN METABOLIC PANEL: CPT | Performed by: FAMILY MEDICINE

## 2023-12-06 PROCEDURE — 85025 COMPLETE CBC W/AUTO DIFF WBC: CPT | Performed by: FAMILY MEDICINE

## 2023-12-06 PROCEDURE — 83036 HEMOGLOBIN GLYCOSYLATED A1C: CPT | Performed by: FAMILY MEDICINE

## 2023-12-06 PROCEDURE — 84443 ASSAY THYROID STIM HORMONE: CPT | Performed by: FAMILY MEDICINE

## 2023-12-06 PROCEDURE — 93010 ELECTROCARDIOGRAM REPORT: CPT | Performed by: INTERNAL MEDICINE

## 2023-12-06 PROCEDURE — 3008F BODY MASS INDEX DOCD: CPT | Performed by: FAMILY MEDICINE

## 2023-12-06 PROCEDURE — 83550 IRON BINDING TEST: CPT | Performed by: FAMILY MEDICINE

## 2023-12-06 PROCEDURE — 84439 ASSAY OF FREE THYROXINE: CPT | Performed by: FAMILY MEDICINE

## 2023-12-06 PROCEDURE — 80061 LIPID PANEL: CPT | Performed by: FAMILY MEDICINE

## 2023-12-06 PROCEDURE — 82306 VITAMIN D 25 HYDROXY: CPT | Performed by: FAMILY MEDICINE

## 2023-12-06 PROCEDURE — 82728 ASSAY OF FERRITIN: CPT | Performed by: FAMILY MEDICINE

## 2023-12-06 PROCEDURE — 83540 ASSAY OF IRON: CPT | Performed by: FAMILY MEDICINE

## 2023-12-08 ENCOUNTER — TELEPHONE (OUTPATIENT)
Dept: FAMILY MEDICINE CLINIC | Facility: CLINIC | Age: 53
End: 2023-12-08

## 2023-12-08 RX ORDER — DEXTROAMPHETAMINE SACCHARATE, AMPHETAMINE ASPARTATE, DEXTROAMPHETAMINE SULFATE AND AMPHETAMINE SULFATE 5; 5; 5; 5 MG/1; MG/1; MG/1; MG/1
20 TABLET ORAL 2 TIMES DAILY
Qty: 60 TABLET | Refills: 0 | Status: SHIPPED | OUTPATIENT
Start: 2023-12-08

## 2023-12-08 NOTE — TELEPHONE ENCOUNTER
Pt asking if Dr. Andrés Hernandez will send the adderall script (as discussed last visit) to [address] [place] [address]

## 2023-12-08 NOTE — TELEPHONE ENCOUNTER
Ok to set up rx for adderall 20mg bid                                                             F/

## 2023-12-21 ENCOUNTER — EXTERNAL RECORD (OUTPATIENT)
Dept: HEALTH INFORMATION MANAGEMENT | Facility: OTHER | Age: 53
End: 2023-12-21

## 2023-12-22 ENCOUNTER — OFFICE VISIT (OUTPATIENT)
Dept: FAMILY MEDICINE CLINIC | Facility: CLINIC | Age: 53
End: 2023-12-22
Payer: COMMERCIAL

## 2023-12-22 VITALS
HEART RATE: 89 BPM | BODY MASS INDEX: 40.81 KG/M2 | SYSTOLIC BLOOD PRESSURE: 138 MMHG | TEMPERATURE: 99 F | DIASTOLIC BLOOD PRESSURE: 80 MMHG | HEIGHT: 67 IN | WEIGHT: 260 LBS | OXYGEN SATURATION: 96 % | RESPIRATION RATE: 18 BRPM

## 2023-12-22 DIAGNOSIS — Z85.6 H/O: CML (CHRONIC MYELOID LEUKEMIA): ICD-10-CM

## 2023-12-22 DIAGNOSIS — J01.00 ACUTE NON-RECURRENT MAXILLARY SINUSITIS: Primary | ICD-10-CM

## 2023-12-22 PROCEDURE — 3075F SYST BP GE 130 - 139MM HG: CPT | Performed by: NURSE PRACTITIONER

## 2023-12-22 PROCEDURE — 3008F BODY MASS INDEX DOCD: CPT | Performed by: NURSE PRACTITIONER

## 2023-12-22 PROCEDURE — 99213 OFFICE O/P EST LOW 20 MIN: CPT | Performed by: NURSE PRACTITIONER

## 2023-12-22 PROCEDURE — 3079F DIAST BP 80-89 MM HG: CPT | Performed by: NURSE PRACTITIONER

## 2023-12-22 RX ORDER — AMOXICILLIN AND CLAVULANATE POTASSIUM 875; 125 MG/1; MG/1
1 TABLET, FILM COATED ORAL 2 TIMES DAILY
Qty: 28 TABLET | Refills: 0 | Status: SHIPPED | OUTPATIENT
Start: 2023-12-22 | End: 2024-01-05

## 2024-01-02 ENCOUNTER — TELEPHONE (OUTPATIENT)
Dept: ORTHOPEDICS CLINIC | Facility: CLINIC | Age: 54
End: 2024-01-02

## 2024-01-02 DIAGNOSIS — M25.562 LEFT KNEE PAIN, UNSPECIFIED CHRONICITY: Primary | ICD-10-CM

## 2024-01-02 NOTE — TELEPHONE ENCOUNTER
Patient called for left knee pain. Please advise for imaging. He was notified to come early   Future Appointments   Date Time Provider Department Center   1/8/2024 10:00 AM Evangel[facility]eder, [id number]   1/16/2024 11:30 AM Ayse Luo, DO [facility]

## 2024-01-05 ENCOUNTER — TELEPHONE (OUTPATIENT)
Dept: ORTHOPEDICS CLINIC | Facility: CLINIC | Age: 54
End: 2024-01-05

## 2024-01-05 DIAGNOSIS — Z01.89 ENCOUNTER FOR LOWER EXTREMITY COMPARISON IMAGING STUDY: Primary | ICD-10-CM

## 2024-01-05 NOTE — TELEPHONE ENCOUNTER
Xray ordered per Ortho protocol  Mychart message sent to patient to arrive 15 - 20 min early to complete imaging.

## 2024-01-08 ENCOUNTER — OFFICE VISIT (OUTPATIENT)
Dept: ORTHOPEDICS CLINIC | Facility: CLINIC | Age: 54
End: 2024-01-08
Payer: COMMERCIAL

## 2024-01-08 ENCOUNTER — HOSPITAL ENCOUNTER (OUTPATIENT)
Dept: GENERAL RADIOLOGY | Age: 54
Discharge: HOME | End: 2024-01-08
Attending: FAMILY MEDICINE
Payer: COMMERCIAL

## 2024-01-08 DIAGNOSIS — M25.562 LEFT KNEE PAIN, UNSPECIFIED CHRONICITY: ICD-10-CM

## 2024-01-08 DIAGNOSIS — Z01.89 ENCOUNTER FOR LOWER EXTREMITY COMPARISON IMAGING STUDY: ICD-10-CM

## 2024-01-08 DIAGNOSIS — M23.92 ACUTE INTERNAL DERANGEMENT OF LEFT KNEE: Primary | ICD-10-CM

## 2024-01-08 DIAGNOSIS — S83.207A POSITIVE MCMURRAY TEST OF LEFT KNEE, INITIAL ENCOUNTER: ICD-10-CM

## 2024-01-08 PROCEDURE — 73564 X-RAY EXAM KNEE 4 OR MORE: CPT | Performed by: FAMILY MEDICINE

## 2024-01-08 PROCEDURE — 99204 OFFICE O/P NEW MOD 45 MIN: CPT | Performed by: FAMILY MEDICINE

## 2024-01-08 PROCEDURE — 73562 X-RAY EXAM OF KNEE 3: CPT | Performed by: FAMILY MEDICINE

## 2024-01-08 RX ORDER — DICLOFENAC SODIUM 75 MG/1
75 TABLET, DELAYED RELEASE ORAL 2 TIMES DAILY
Qty: 28 TABLET | Refills: 1 | Status: SHIPPED | OUTPATIENT
Start: 2024-01-08

## 2024-01-08 NOTE — H&P
Sports Medicine Clinic Note     Subjective:    Chief Complaint: Acute left knee pain.    History of Present Illness: This is a pleasant 53 year old male who presents with acute pain in the left knee for the last 2 weeks. The patient describes an incident occurring wherein they twisted the knee following a mechanical stumble. The incident was followed by immediate pain and swelling. Reports difficulty bearing weight on the affected leg. Denies any history of prior left knee injuries or surgeries but does report similar instance in the right knee that was successfully treated with arthroscopy. No reported numbness, tingling, or other neurological symptoms. The patient does report mechanical symptoms, no locking but left knee is catching and clicking now which is new since injury.    Objective:    Left Knee Examination:    Inspection:  Antalgic gait  Neutral alignment  No appreciable muscle atrophy  No warmth, erythema  Moderate effusion    Palpation:  Tenderness over the medial joint line  No tenderness over the lateral joint line, patella, with patellofemoral compression, over the tibial tubercle, suprapatellar pouch, pes anserine bursa, popliteal fossa, distal IT band, or other compartments of the leg  Flexion from 0-135 with catching throughout  Extensor mechanism intact and without palpable defects  No audible crepitus    Neurovascular:  SILT S / S / SP / DP / Tib nerve distributions  Fires quad / hamstrings / GSC / TA / EHL  2+ DP & PT pulses, brisk cap refill    Special:  Normal patellar tracking  No laxity/opening to varus/valgus force at both 30 and 0 degrees  Negative Lachman  Negative Posterior Drawer  Negative Dorota    Imaging:    Radiographs of the knee were personally reviewed in the office and revealed no apparent fracture, dislocation, or significant joint space narrowing. Patellar alignment and overall bony architecture appears normal.    Assessment:    Suspected Internal Derangement of Left  Knee  Differential Diagnosis: Ligamentous injury, Meniscal tear, Chondral injury    Plan:    Imaging: Order MRI of the knee to evaluate chondral surfaces, ligamentous structures, and rule out internal derangement.  Medication: Prescribe Diclofenac for pain management, can be combined with Acetaminophen as needed for pain control.  Activity Recommendations: Activity is advised to be limited to tolerance, avoiding excessive walking or standing for prolonged periods. Advise the patient to avoid activities that exacerbate pain.  Bracing: Knee brace for stability and support during activities.    Follow-up: Tentative follow-up is planned following MRI to reassess the treatment plan and adapt as necessary. Instruct the patient to return earlier if symptoms worsen or new symptoms develop.    Evangelist Reeder DO, CAQSM   Primary Care Sports Medicine    Department of Orthopaedic Surgery  [address] [address]   [address] [place] [address]    t: [phone]  f: [phone]      [facility]

## 2024-01-12 ENCOUNTER — TELEPHONE (OUTPATIENT)
Dept: ORTHOPEDICS CLINIC | Facility: CLINIC | Age: 54
End: 2024-01-12

## 2024-01-12 ENCOUNTER — OFFICE VISIT (OUTPATIENT)
Dept: ORTHOPEDICS CLINIC | Facility: CLINIC | Age: 54
End: 2024-01-12
Payer: COMMERCIAL

## 2024-01-12 DIAGNOSIS — S83.412D SPRAIN OF MEDIAL COLLATERAL LIGAMENT OF LEFT KNEE, SUBSEQUENT ENCOUNTER: ICD-10-CM

## 2024-01-12 DIAGNOSIS — S76.109A INJURY OF QUADRICEPS MUSCLE: ICD-10-CM

## 2024-01-12 DIAGNOSIS — S83.512D SPRAIN OF ANTERIOR CRUCIATE LIGAMENT OF LEFT KNEE, SUBSEQUENT ENCOUNTER: ICD-10-CM

## 2024-01-12 DIAGNOSIS — S83.222D PERIPHERAL TEAR OF MEDIAL MENISCUS, CURRENT INJURY, LEFT KNEE, SUBSEQUENT ENCOUNTER: Primary | ICD-10-CM

## 2024-01-12 PROCEDURE — 99214 OFFICE O/P EST MOD 30 MIN: CPT | Performed by: FAMILY MEDICINE

## 2024-01-12 RX ORDER — TRAMADOL HYDROCHLORIDE 50 MG/1
TABLET ORAL
Qty: 20 TABLET | Refills: 1 | Status: SHIPPED | OUTPATIENT
Start: 2024-01-12

## 2024-01-12 NOTE — TELEPHONE ENCOUNTER
Helga Pickering, patient was referred to you for a left knee surgery consultation by Dr. Reeder. Patient was seen by him today. Okay to double book on your schedule or next available?

## 2024-01-12 NOTE — PROGRESS NOTES
Sports Medicine Clinic Note     Subjective    Chief Complaint: Follow up left knee pain, MRI review    Interval History of Present Illness: 53-year-old male patient presents for follow-up regarding ongoing left knee pain, which he first experienced after tripping and twisting his knee. He describes persistent pain, swelling, and a limited range of motion since the incident. The patient recalls a previous similar issue in his right knee that was treated successfully with arthroscopy. He reports new symptoms of the left knee catching and clicking, which were not present before the injury. There is no history of numbness, tingling, or other neurological symptoms.    Objective:    Physical Examination - Left Knee:    Inspection: Antalgic gait, neutral alignment, no muscle atrophy, no warmth or erythema, moderate effusion.  Palpation: Tenderness over medial joint line; no tenderness at lateral joint line, patella, tibial tubercle, suprapatellar pouch, pes anserine bursa, popliteal fossa, or distal IT band.  Range of Motion: Flexion from 0-135 degrees, noting catching. Extensor mechanism intact, no crepitus.  Neurovascular: Sensation intact, strong muscle activation in quadriceps and hamstrings, normal ankle dorsiflexion and plantarflexion. Pulses palpable and brisk capillary refill.  Special Tests: Normal patellar tracking, stable knee with no varus/valgus laxity at 30 and 0 degrees, negative Lachman, negative Posterior Drawer, and positive Dorota test.    Imaging:    Radiographs of the knees were personally reviewed in the office and revealed no apparent fracture, dislocation. Joint space narrowing and peripheral osteophytes noted in the medial tibiofemoral spaces primarily.    MRI Findings: Medial meniscal tear, mild ACL sprain without discrete tear, chronic mild MCL sprain, and mild quadriceps tendon tendinosis with partial tearing.    Assessment:    Medial Meniscal Tear.  Mild Sprain of ACL without discrete  tear.  Mild Chronic MCL Sprain.  Quadriceps Tendon Tendinosis with Partial Tearing.    Plan:    We had a lengthy discussion about the patient's MRI findings. In the setting of degenerative changes in the knee cannot be confident that arthroscopic intervention of his meniscal injury will completely resolve his pain. That being said he does report catching and locking of the knee which arthroscopic intervention would likely be able to address as this is potentially implicated with his meniscal injury. He had a very similar discussion regarding his right knee in the past that also had a meniscal injury in the setting of DJD. He underwent arthroscopic intervention of the right knee previously with some success but does admit today that some of the pain has persisted and he does rely on PO diclofenac daily to remain functional in his day-to-day activities. Given this previous experience he expressed preference to discuss options with a knee replacement specialist. I advised that if this is the case then we should try a injection today of the need to maximize nonoperative options before discussing knee replacement he elected to defer an injection at this time but would like Dr. Pickering's information either way which was provided. We did discuss at length that injection therapy is typically maximized first prior to consideration of arthroplasty which he expressed understanding of but would like to continue to defer. Information for sports surgeons also provided to address mechanical symptoms. Bracing: Continue use of knee brace for support and stability. Physical Therapy: Recommended, but the patient deferred at this time. Medication: Continue Diclofenac for pain management. Can be combined with Acetaminophen as needed. Activity Recommendations: Limit activities to tolerance level, avoiding exacerbation of pain.    Follow-up: Tentative follow-up as needed for injection therapy if he chooses to proceed with  this.    Evangelist Reeder DO, CAQSM   Primary Care Sports Medicine    Department of Orthopaedic Surgery  [address] [address]   [address] [place] [id number]    t: [phone]  f: [phone]      [facility]

## 2024-01-16 NOTE — TELEPHONE ENCOUNTER
Patient scheduled. Please advise if additional imaging will be needed  Future Appointments   Date Time Provider Department Center   2/19/2024 10:00 AM Daniel Pickering MD [facility]   7/16/2024 10:30 AM Ayse Luo,  [facility] [address]

## 2024-02-09 DIAGNOSIS — F98.8 ATTENTION DEFICIT DISORDER (ADD) IN ADULT: ICD-10-CM

## 2024-02-09 RX ORDER — DEXTROAMPHETAMINE SACCHARATE, AMPHETAMINE ASPARTATE, DEXTROAMPHETAMINE SULFATE AND AMPHETAMINE SULFATE 5; 5; 5; 5 MG/1; MG/1; MG/1; MG/1
20 TABLET ORAL 2 TIMES DAILY
Qty: 60 TABLET | Refills: 0 | Status: SHIPPED | OUTPATIENT
Start: 2024-02-09 | End: 2024-03-10

## 2024-02-09 RX ORDER — DEXTROAMPHETAMINE SACCHARATE, AMPHETAMINE ASPARTATE, DEXTROAMPHETAMINE SULFATE AND AMPHETAMINE SULFATE 5; 5; 5; 5 MG/1; MG/1; MG/1; MG/1
20 TABLET ORAL 2 TIMES DAILY
Qty: 60 TABLET | Refills: 0 | Status: SHIPPED | OUTPATIENT
Start: 2024-03-11 | End: 2024-04-10

## 2024-02-09 RX ORDER — DEXTROAMPHETAMINE SACCHARATE, AMPHETAMINE ASPARTATE, DEXTROAMPHETAMINE SULFATE AND AMPHETAMINE SULFATE 5; 5; 5; 5 MG/1; MG/1; MG/1; MG/1
20 TABLET ORAL 2 TIMES DAILY
Qty: 60 TABLET | Refills: 0 | Status: SHIPPED | OUTPATIENT
Start: 2024-04-11 | End: 2024-05-11

## 2024-02-09 NOTE — TELEPHONE ENCOUNTER
Patient is needing 3-month of refills for amphetamine-dextroamphetamine (ADDERALL) 20 MG Oral Tab refills.    Send to: [id number] [facility] [address] AT [facility] [address], [phone], [phone]

## 2024-02-13 ENCOUNTER — HOSPITAL ENCOUNTER (OUTPATIENT)
Dept: CT IMAGING | Facility: HOSPITAL | Age: 54
Discharge: HOME | End: 2024-02-13
Attending: FAMILY MEDICINE
Payer: COMMERCIAL

## 2024-02-13 DIAGNOSIS — Z87.891 FORMER TOBACCO USE: ICD-10-CM

## 2024-02-13 DIAGNOSIS — Z77.128 EXPOSURE TO ENVIRONMENTAL HAZARD: ICD-10-CM

## 2024-02-13 PROCEDURE — 71271 CT THORAX LUNG CANCER SCR C-: CPT | Performed by: FAMILY MEDICINE

## 2024-02-19 ENCOUNTER — OFFICE VISIT (OUTPATIENT)
Dept: ORTHOPEDICS CLINIC | Facility: CLINIC | Age: 54
End: 2024-02-19
Payer: COMMERCIAL

## 2024-02-19 VITALS — HEIGHT: 67 IN | WEIGHT: 282.63 LBS | BODY MASS INDEX: 44.36 KG/M2

## 2024-02-19 DIAGNOSIS — S83.222D PERIPHERAL TEAR OF MEDIAL MENISCUS, CURRENT INJURY, LEFT KNEE, SUBSEQUENT ENCOUNTER: Primary | ICD-10-CM

## 2024-02-19 NOTE — H&P
[facility] New Patient Note    CC:   Chief Complaint   Patient presents with    Knee Pain     Left knee pain . Referred by  for left knee surgical consult        HPI: This 53 year old male presents today with complaints of left knee pain.  He reports that this started a few months back when he had a twist of his knee and felt immediate onset of pain along the inside of the knee/points to the medial joint line.  He states that over the past few months, symptoms have been fairly stable/persistent.  He has pain with weightbearing, putting any weight across the joint, as well as pain with range of motion of the knee and attempting to straighten it completely or bend it past 90.  He reports some mechanical symptoms as well with catching and clicking.  He states he had a similar issue in the past for his right knee years ago that underwent arthroscopic surgery for meniscus tear and he did get improvement in his symptoms.  He has taken medications without sufficient relief.  He has not done injection for the left knee, but notes he did do this for the right knee and it did not help at all and therefore he does not want to proceed with another injection.    Past Medical History:   Diagnosis Date    Essential hypertension     High blood pressure     High cholesterol     Leukemia (HCC)     Leukemia (HCC)     Personal history of antineoplastic chemotherapy     oral chemo (Sprycel)    Smoking 04/27/2016     Past Surgical History:   Procedure Laterality Date    COLONOSCOPY  07/2016    diverticulosis, polyps (adenoma/hp). repeat 5 years    COLONOSCOPY N/A 05/17/2021    Procedure: COLONOSCOPY;  Surgeon: Fortino Maier MD;  Location:  ENDOSCOPY    COLONOSCOPY,BIOPSY N/A 07/11/2016    Procedure: COLONOSCOPY, POSSIBLE BIOPSY, POSSIBLE POLYPECTOMY 62123;  Surgeon: Fortino Maier MD;  Location: [facility]    COLONOSCOPY,REMV LESN,SNARE N/A 07/11/2016    Procedure: COLONOSCOPY, POSSIBLE BIOPSY, POSSIBLE  POLYPECTOMY 04088;  Surgeon: Fortino Maier MD;  Location: [facility]    CREATE EARDRUM OPENING,GEN ANESTH  10/07/2021    KNEE ARTHROSCOPY      LAP GASTRIC BYPASS/LORA-EN-Y      OTHER SURGICAL HISTORY  2005    Rotator Cuff Repair left side    OTHER SURGICAL HISTORY Right     meniscus     Current Outpatient Medications   Medication Sig Dispense Refill    amphetamine-dextroamphetamine (ADDERALL) 20 MG Oral Tab Take 1 tablet (20 mg total) by mouth 2 (two) times daily. 60 tablet 0    [START ON 3/11/2024] amphetamine-dextroamphetamine (ADDERALL) 20 MG Oral Tab Take 1 tablet (20 mg total) by mouth 2 (two) times daily. 60 tablet 0    [START ON 4/11/2024] amphetamine-dextroamphetamine (ADDERALL) 20 MG Oral Tab Take 1 tablet (20 mg total) by mouth 2 (two) times daily. 60 tablet 0    traMADol 50 MG Oral Tab Please take 1 to 2 tablets every 4 hours as needed for pain. 20 tablet 1    amphetamine-dextroamphetamine (ADDERALL) 20 MG Oral Tab Take 1 tablet (20 mg total) by mouth 2 (two) times daily. 60 tablet 0    spironolactone 50 MG Oral Tab Take 1 tablet (50 mg total) by mouth daily. 90 tablet 0    ATORVASTATIN 10 MG Oral Tab TAKE 1 TABLET(10 MG) BY MOUTH DAILY 90 tablet 0    diclofenac 75 MG Oral Tab EC Take 1 tablet (75 mg total) by mouth 2 (two) times daily as needed. 60 tablet 1    LOSARTAN 100 MG Oral Tab TAKE 1 TABLET(100 MG) BY MOUTH DAILY 90 tablet 0    Dasatinib (SPRYCEL) 100 MG Oral Tab Take 1 tablet by mouth daily.  0    Labetalol HCl 200 MG Oral Tab Take 1 tablet (200 mg total) by mouth 2 (two) times daily. 60 tablet 3    Vitamin B-12 1000 MCG Oral Tab Take 1 tablet (1,000 mcg total) by mouth daily.      Cholecalciferol (VITAMIN D3) 1.25 MG (31835 UT) Oral Cap Take 1 tablet by mouth once a week.      Ascorbic Acid (VITAMIN C) 1000 MG Oral Tab Take 1 tablet (1,000 mg total) by mouth daily.       Allergies   Allergen Reactions    Iodine (Topical) RASH     Family History   Problem Relation Age of Onset    Other  (COPD [Other]) Father     Heart Attack Father     Stroke Father     Breast Cancer Mother     Diabetes Mother      Social History     Occupational History    Not on file   Tobacco Use    Smoking status: Former     Packs/day: .5     Types: Cigarettes     Quit date: 8/10/2018     Years since quittin.5    Smokeless tobacco: Never   Vaping Use    Vaping Use: Never used   Substance and Sexual Activity    Alcohol use: Yes     Comment: once per month    Drug use: Yes     Types: Cannabis     Comment: medical marijuana card    Sexual activity: Not on file        ROS:  Detailed system review obtained and negative except as mentioned above      Physical Exam:    Ht 5' 7\" (1.702 m)   Wt 282 lb 9.6 oz (128.2 kg)   BMI 44.26 kg/m²   Constitutional: Awake, alert, no distress.  Very pleasant  Psychological: Appropriate affect.  Respiratory: Unlabored breathing.  Left lower extremity:  Inspection: skin intact, varus valgus stabilizing knee brace in place.  Upon removal, skin intact, no wounds or swelling or ecchymosis  Palpation: Focally tender to palpation about the medial joint line, reproduces pain complaint  Range of motion: Active extension of the knee is difficult due to pain, passively also causes pain medially.  Flexion past 90 is stiff and painful with active flexion.  Knee stable to varus and valgus stress but there is reproduction of significant pain with valgus stress testing  Neuromuscular: 5 out of 5 quad strength, sensation intact  Vascular: Warm well-perfused      Imaging: X-rays and MRI of the left knee personally viewed, independently interpreted and radiology report read.  Weightbearing x-rays demonstrate minimal to no degenerative changes with largely maintained medial and lateral joint space, no osteophytes.  MRI demonstrates tear of the posterior medial meniscus with a radial component      Assessment/Plan:  Diagnoses and all orders for this visit:    Peripheral tear of medial meniscus, current injury,  left knee, subsequent encounter      Assessment: 53-year-old male with acute left knee pain and MRI finding of meniscus tear    Plan: We discussed the anatomy, etiologies.  He has followed with Dr. Peters, had question whether knee replacement would be his next best option.  Did discuss with the patient that decision for knee replacement is based off weightbearing x-ray which correlates with outcome, and findings on weightbearing x-rays demonstrate minimal if any degenerative arthritis of the left knee.  Did discuss that replacement would not be a recommended treatment option, and is also associated with suboptimal outcomes when done for minimal/mild osteoarthritis at worst.  He states that the symptoms are similar to his right knee in the past for which he underwent arthroscopic surgery for his medial meniscus tear.  He does state that he has done nonsurgical treatments for the right knee in the past and prefers not going through these again.  He does have questions whether arthroscopic surgery for his left knee would be warranted, as symptoms have not been improving over a few months.  I did discuss with him that this would be an area I would advise having him seen by one of our sports orthopedic surgeons, and we will help him set up an appointment.  He expressed understanding and agreement.    Daniel Pickering MD, FAAOS  [facility] Orthopedic Surgery  Phone [phone]  Fax [phone]

## 2024-02-21 ENCOUNTER — TELEPHONE (OUTPATIENT)
Dept: ORTHOPEDICS CLINIC | Facility: CLINIC | Age: 54
End: 2024-02-21

## 2024-02-21 ENCOUNTER — OFFICE VISIT (OUTPATIENT)
Dept: ORTHOPEDICS CLINIC | Facility: CLINIC | Age: 54
End: 2024-02-21
Payer: COMMERCIAL

## 2024-02-21 VITALS — WEIGHT: 282 LBS | HEIGHT: 67 IN | BODY MASS INDEX: 44.26 KG/M2

## 2024-02-21 DIAGNOSIS — M23.204 OLD COMPLEX TEAR OF MEDIAL MENISCUS OF LEFT KNEE: Primary | ICD-10-CM

## 2024-02-21 DIAGNOSIS — M22.42 CHONDROMALACIA OF LEFT PATELLA: ICD-10-CM

## 2024-02-21 DIAGNOSIS — C92.10 CML (CHRONIC MYELOCYTIC LEUKEMIA) (HCC): ICD-10-CM

## 2024-02-21 PROCEDURE — 99215 OFFICE O/P EST HI 40 MIN: CPT | Performed by: ORTHOPAEDIC SURGERY

## 2024-02-21 NOTE — PROGRESS NOTES
[facility] New Patient Consult        Chief Complaint: Acute left knee pain    History: The patient is a 53 year old male with history of leukemia in remission presenting at the request of Dr. Daniel Pickering for orthopaedic consultation due to persistent and progressive medial left knee pain.  The patient describes onset about 3 months ago without any history of significant injury.  He has continued to have significant stiffness, medial sided pain despite conservative care.  The patient underwent an MRI which is available for our review.  Symptoms remain localized to the medial and anterior aspect of the knee with no new complicating complaints.  Symptoms have prevented return to previous levels of activity including a normal gait.  He has a history of right knee arthroscopy performed in 2022 after which she has done well other than some mild residual arthritic symptoms responsive to diclofenac.     Past Medical History:   Diagnosis Date    Essential hypertension     High blood pressure     High cholesterol     Leukemia (HCC)     Leukemia (HCC)     Personal history of antineoplastic chemotherapy     oral chemo (Sprycel)    Smoking 04/27/2016     Past Surgical History:   Procedure Laterality Date    COLONOSCOPY  07/2016    diverticulosis, polyps (adenoma/hp). repeat 5 years    COLONOSCOPY N/A 05/17/2021    Procedure: COLONOSCOPY;  Surgeon: Fortino Maier MD;  Location:  ENDOSCOPY    COLONOSCOPY,BIOPSY N/A 07/11/2016    Procedure: COLONOSCOPY, POSSIBLE BIOPSY, POSSIBLE POLYPECTOMY 25851;  Surgeon: Fortino Maier MD;  Location: [facility]    COLONOSCOPY,REMV LESN,SNARE N/A 07/11/2016    Procedure: COLONOSCOPY, POSSIBLE BIOPSY, POSSIBLE POLYPECTOMY 11687;  Surgeon: Fortino Maier MD;  Location: [facility]    CREATE EARDRUM OPENING,GEN ANESTH  10/07/2021    KNEE ARTHROSCOPY      LAP GASTRIC BYPASS/LORA-EN-Y      OTHER SURGICAL HISTORY  2005    Rotator Cuff Repair left side    OTHER SURGICAL  HISTORY Right     meniscus     Current Outpatient Medications   Medication Sig Dispense Refill    amphetamine-dextroamphetamine (ADDERALL) 20 MG Oral Tab Take 1 tablet (20 mg total) by mouth 2 (two) times daily. 60 tablet 0    [START ON 3/11/2024] amphetamine-dextroamphetamine (ADDERALL) 20 MG Oral Tab Take 1 tablet (20 mg total) by mouth 2 (two) times daily. 60 tablet 0    [START ON 4/11/2024] amphetamine-dextroamphetamine (ADDERALL) 20 MG Oral Tab Take 1 tablet (20 mg total) by mouth 2 (two) times daily. 60 tablet 0    amphetamine-dextroamphetamine (ADDERALL) 20 MG Oral Tab Take 1 tablet (20 mg total) by mouth 2 (two) times daily. 60 tablet 0    spironolactone 50 MG Oral Tab Take 1 tablet (50 mg total) by mouth daily. 90 tablet 0    ATORVASTATIN 10 MG Oral Tab TAKE 1 TABLET(10 MG) BY MOUTH DAILY 90 tablet 0    LOSARTAN 100 MG Oral Tab TAKE 1 TABLET(100 MG) BY MOUTH DAILY 90 tablet 0    Dasatinib (SPRYCEL) 100 MG Oral Tab Take 1 tablet by mouth daily.  0    Labetalol HCl 200 MG Oral Tab Take 1 tablet (200 mg total) by mouth 2 (two) times daily. 60 tablet 3    Vitamin B-12 1000 MCG Oral Tab Take 1 tablet (1,000 mcg total) by mouth daily.      Cholecalciferol (VITAMIN D3) 1.25 MG (38784 UT) Oral Cap Take 1 tablet by mouth once a week.      Ascorbic Acid (VITAMIN C) 1000 MG Oral Tab Take 1 tablet (1,000 mg total) by mouth daily.      traMADol 50 MG Oral Tab Please take 1 to 2 tablets every 4 hours as needed for pain. (Patient not taking: Reported on 2/21/2024) 20 tablet 1    diclofenac 75 MG Oral Tab EC Take 1 tablet (75 mg total) by mouth 2 (two) times daily as needed. (Patient not taking: Reported on 2/21/2024) 60 tablet 1     Allergies   Allergen Reactions    Iodine (Topical) RASH     Family History   Problem Relation Age of Onset    Other (COPD [Other]) Father     Heart Attack Father     Stroke Father     Breast Cancer Mother     Diabetes Mother      Social History     Occupational History    Not on file   Tobacco  Use    Smoking status: Former     Packs/day: .5     Types: Cigarettes     Quit date: 8/10/2018     Years since quittin.5    Smokeless tobacco: Never   Vaping Use    Vaping Use: Never used   Substance and Sexual Activity    Alcohol use: Yes     Comment: once per month    Drug use: Yes     Types: Cannabis     Comment: medical marijuana card    Sexual activity: Not on file        ROS:  Complete ROS reviewed by me and non-contributory to the chief complaint except as mentioned above.    Physical Exam:    Ht 5' 7\" (1.702 m)   Wt 282 lb (127.9 kg)   BMI 44.17 kg/m²   Constitutional: Well developed, well nourished 53 year old male  Psychological: NAD, alert and appropriate  Respiratory: Breathing comfortably on room air with RR of 10-14  Cardiac: Palpable distal pulses with pink warm extremities  Left knee exam:  On examination there is no significant swelling or discoloration about the knee.  There is exquisite anterior medial and posterior medial joint line tenderness with a painful Dorota's test.  Lateral joint line is less tender.  Knee ligaments are stable on varus valgus stress with solid endpoints.  Lachman and posterior drawer are intact.  Range of motion testing demonstrates trace flexion contracture with 95 degrees of flexion with discomfort on passive hyperflexion.  There is no distal edema about the foot and ankle with intact motor, sensory and perfusion.     Imaging Results:      XR KNEE (3 VIEWS), RIGHT (CPT=73562)    Result Date: 2024  CONCLUSION:  See above.   LOCATION:  [facility]   Dictated by (CST): Hitesh Holm MD on 2024 at 11:19 AM     Finalized by (CST): Hitesh Holm MD on 2024 at 11:20 AM       XR KNEE, COMPLETE (4 OR MORE VIEWS), LEFT (CPT=73564)    Result Date: 2024  CONCLUSION:  See above.   LOCATION:  [facility]   Dictated by (CST): Hitesh Holm MD on 2024 at 11:18 AM     Finalized by (CST): Hitesh Holm MD on 2024 at 11:19 AM          MRI left knee personally viewed,  independently interpreted and radiology report read.  Signal is seen at the junction of the posterior horn and mid body consistent with macro tearing of the medial meniscus without obvious displacement.  Quadriceps tendinosis is also suspected.  Lateral meniscus, cruciates and collaterals are intact.    MRI left knee obtained at 1/9/2024:    IMPRESSION:   1. Medial meniscal tear.   2. Mild sprain of the ACL without discrete tear.   3. Mild chronic MCL sprain.   4. Findings felt to represent mild tendinosis and mild partial tearing of the quadriceps tendon centrally, likely chronic. No discrete fluid-filled gap or tendon retraction is seen. Please correlate clinically.     This report was performed utilizing speech recognition software technology. Despite thorough proofreading, speech recognition errors could escape detection. If a word or phrase is confusing or out of context, please do not hesitate to call for   clarification.     Interpreting Radiologist:     Husam Munroe M.D.   Electronically Signed: 01/09/2024 03:30 PM      Assessment: Diagnoses and all orders for this visit:    Diagnoses and all orders for this visit:    Old complex tear of medial meniscus of left knee    Chondromalacia of left patella    CML (chronic myelocytic leukemia) (HCC)      Plan: We reviewed imaging and exam findings with the patient.  MRI correlates with physical exam identifying a meniscus tear which remains symptomatic.  We had a long discussion regarding the nature of this diagnosis and treatment options.  This includes continued conservative care with activity protection, injections versus minimally invasive management with arthroscopy.  In light of continuing symptoms and an active lifestyle, the patient would like to proceed with arthroscopy.  We discussed in detail the nature of the procedure and the typical postoperative course.  Risk, benefits and alternatives to surgery were discussed including but not limited to possible  infection, bleeding, neurovascular injury as well as postop stiffness, continued pain and failed improvement following surgery.  Risks of anesthesia were also reviewed including but not limited to possible cardiac, pulmonary, or cerebrovascular complications, any of which could be life-threatening.  Given the patients stabilized leukemia, the risk of the serious complications is felt to be low but not zero.  Pre-operative medical clearance for anesthesia will be required.  The patient verbalized understanding and agreement and was advised to contact our office for surgical scheduling if desired.  The proposed outpatient procedure is left knee arthroscopy with partial medial meniscectomy and chondroplasty as needed.  All questions were answered and patient verbalized understanding and appreciation.    Pat Altman MD, FAAOS  Sports Medicine/Knee and Shoulder  [facility]  Department of Orthopaedics  Phone [phone]  Fax [phone]    This document was partially prepared using Dragon Medical voice recognition software.  Although every attempt is made to correct errors during dictation, discrepancies may still exist.

## 2024-02-23 ENCOUNTER — TELEPHONE (OUTPATIENT)
Dept: ORTHOPEDICS CLINIC | Facility: CLINIC | Age: 54
End: 2024-02-23

## 2024-02-23 DIAGNOSIS — M22.42 CHONDROMALACIA OF LEFT PATELLA: ICD-10-CM

## 2024-02-23 DIAGNOSIS — M23.204 OLD COMPLEX TEAR OF MEDIAL MENISCUS OF LEFT KNEE: Primary | ICD-10-CM

## 2024-02-23 NOTE — TELEPHONE ENCOUNTER
Called and spoke with Valerio in regards to getting him scheduled for surgery. I did discuss possible surgical dates, as he has opted to proceed with surgery on 3/8/24. I did confirm that his surgery will take place at [facility]. I also discussed the surgical protocol and post op appt date and time with him. He states understanding with no concerns or questions.

## 2024-02-27 NOTE — TELEPHONE ENCOUNTER
SURGERY SCHEDULING SHEET    Valerio Fleipe  12/18/1970  [id number]    Procedure: Left Knee Arthroscopy, Partial Medial Meniscectomy (01708), and Chondroplasty (27860)    Diagnosis:    Old complex tear of medial meniscus of left knee M23.204    Chondromalacia of left patella M22.42    Anesthesia: General    Length of Surgery: 1 hr    Disposition: Outpatient    Special Equipment: NONE    Positioning:    Assist: Yes ALVINO DE LEÓN    Pre-op Testing: PER ANESTHESIA GUIDELINES    Clearance: HISTORY AND PHYSICAL     Post op: 7-10 days post op    Pat Altman MD  [facility] Orthopedic Surgery  Phone: [phone]  Fax: [phone]

## 2024-03-01 ENCOUNTER — OFFICE VISIT (OUTPATIENT)
Dept: FAMILY MEDICINE CLINIC | Facility: CLINIC | Age: 54
End: 2024-03-01
Payer: COMMERCIAL

## 2024-03-01 ENCOUNTER — LAB ENCOUNTER (OUTPATIENT)
Dept: LAB | Age: 54
End: 2024-03-01
Attending: FAMILY MEDICINE
Payer: COMMERCIAL

## 2024-03-01 ENCOUNTER — EKG ENCOUNTER (OUTPATIENT)
Dept: LAB | Age: 54
End: 2024-03-01
Attending: FAMILY MEDICINE
Payer: COMMERCIAL

## 2024-03-01 VITALS
BODY MASS INDEX: 43.79 KG/M2 | OXYGEN SATURATION: 96 % | RESPIRATION RATE: 16 BRPM | WEIGHT: 279 LBS | DIASTOLIC BLOOD PRESSURE: 76 MMHG | SYSTOLIC BLOOD PRESSURE: 126 MMHG | HEIGHT: 67 IN | HEART RATE: 77 BPM

## 2024-03-01 DIAGNOSIS — Z85.6 H/O: CML (CHRONIC MYELOID LEUKEMIA): ICD-10-CM

## 2024-03-01 DIAGNOSIS — F98.8 ATTENTION DEFICIT DISORDER (ADD) IN ADULT: ICD-10-CM

## 2024-03-01 DIAGNOSIS — Z86.010 HISTORY OF ADENOMATOUS POLYP OF COLON: ICD-10-CM

## 2024-03-01 DIAGNOSIS — Z01.810 PREOP CARDIOVASCULAR EXAM: Primary | ICD-10-CM

## 2024-03-01 DIAGNOSIS — Z87.891 FORMER TOBACCO USE: ICD-10-CM

## 2024-03-01 DIAGNOSIS — Z01.810 PREOP CARDIOVASCULAR EXAM: ICD-10-CM

## 2024-03-01 DIAGNOSIS — Z85.820 HISTORY OF MELANOMA: ICD-10-CM

## 2024-03-01 DIAGNOSIS — Z98.84 LAP-BAND SURGERY STATUS: ICD-10-CM

## 2024-03-01 DIAGNOSIS — S83.222D PERIPHERAL TEAR OF MEDIAL MENISCUS OF LEFT KNEE AS CURRENT INJURY, SUBSEQUENT ENCOUNTER: ICD-10-CM

## 2024-03-01 DIAGNOSIS — G47.33 OSA (OBSTRUCTIVE SLEEP APNEA): ICD-10-CM

## 2024-03-01 LAB
ALBUMIN SERPL-MCNC: 3.9 G/DL (ref 3.4–5)
ALBUMIN/GLOB SERPL: 1.1 {RATIO} (ref 1–2)
ALP LIVER SERPL-CCNC: 79 U/L
ALT SERPL-CCNC: 26 U/L
ANION GAP SERPL CALC-SCNC: 3 MMOL/L (ref 0–18)
AST SERPL-CCNC: 16 U/L (ref 15–37)
ATRIAL RATE: 71 BPM
BASOPHILS # BLD AUTO: 0.08 X10(3) UL (ref 0–0.2)
BASOPHILS NFR BLD AUTO: 0.9 %
BILIRUB SERPL-MCNC: 0.4 MG/DL (ref 0.1–2)
BUN BLD-MCNC: 24 MG/DL (ref 9–23)
CALCIUM BLD-MCNC: 9.5 MG/DL (ref 8.5–10.1)
CHLORIDE SERPL-SCNC: 108 MMOL/L (ref 98–112)
CO2 SERPL-SCNC: 26 MMOL/L (ref 21–32)
CREAT BLD-MCNC: 1.44 MG/DL
EGFRCR SERPLBLD CKD-EPI 2021: 57 ML/MIN/1.73M2 (ref 60–?)
EOSINOPHIL # BLD AUTO: 0.17 X10(3) UL (ref 0–0.7)
EOSINOPHIL NFR BLD AUTO: 2 %
ERYTHROCYTE [DISTWIDTH] IN BLOOD BY AUTOMATED COUNT: 13 %
FASTING STATUS PATIENT QL REPORTED: YES
GLOBULIN PLAS-MCNC: 3.5 G/DL (ref 2.8–4.4)
GLUCOSE BLD-MCNC: 102 MG/DL (ref 70–99)
HCT VFR BLD AUTO: 36 %
HGB BLD-MCNC: 12.1 G/DL
IMM GRANULOCYTES # BLD AUTO: 0.03 X10(3) UL (ref 0–1)
IMM GRANULOCYTES NFR BLD: 0.4 %
LYMPHOCYTES # BLD AUTO: 2.04 X10(3) UL (ref 1–4)
LYMPHOCYTES NFR BLD AUTO: 24.1 %
MCH RBC QN AUTO: 31.2 PG (ref 26–34)
MCHC RBC AUTO-ENTMCNC: 33.6 G/DL (ref 31–37)
MCV RBC AUTO: 92.8 FL
MONOCYTES # BLD AUTO: 0.9 X10(3) UL (ref 0.1–1)
MONOCYTES NFR BLD AUTO: 10.6 %
NEUTROPHILS # BLD AUTO: 5.24 X10 (3) UL (ref 1.5–7.7)
NEUTROPHILS # BLD AUTO: 5.24 X10(3) UL (ref 1.5–7.7)
NEUTROPHILS NFR BLD AUTO: 62 %
OSMOLALITY SERPL CALC.SUM OF ELEC: 288 MOSM/KG (ref 275–295)
P AXIS: 38 DEGREES
P-R INTERVAL: 178 MS
PLATELET # BLD AUTO: 169 10(3)UL (ref 150–450)
POTASSIUM SERPL-SCNC: 4.6 MMOL/L (ref 3.5–5.1)
PROT SERPL-MCNC: 7.4 G/DL (ref 6.4–8.2)
Q-T INTERVAL: 410 MS
QRS DURATION: 100 MS
QTC CALCULATION (BEZET): 445 MS
R AXIS: 40 DEGREES
RBC # BLD AUTO: 3.88 X10(6)UL
SODIUM SERPL-SCNC: 137 MMOL/L (ref 136–145)
T AXIS: 32 DEGREES
VENTRICULAR RATE: 71 BPM
WBC # BLD AUTO: 8.5 X10(3) UL (ref 4–11)

## 2024-03-01 PROCEDURE — 80053 COMPREHEN METABOLIC PANEL: CPT | Performed by: FAMILY MEDICINE

## 2024-03-01 PROCEDURE — 85025 COMPLETE CBC W/AUTO DIFF WBC: CPT | Performed by: FAMILY MEDICINE

## 2024-03-01 PROCEDURE — 93010 ELECTROCARDIOGRAM REPORT: CPT | Performed by: INTERNAL MEDICINE

## 2024-03-01 PROCEDURE — 36415 COLL VENOUS BLD VENIPUNCTURE: CPT | Performed by: FAMILY MEDICINE

## 2024-03-01 PROCEDURE — 93005 ELECTROCARDIOGRAM TRACING: CPT

## 2024-03-01 PROCEDURE — 99213 OFFICE O/P EST LOW 20 MIN: CPT | Performed by: FAMILY MEDICINE

## 2024-03-01 NOTE — PROGRESS NOTES
Valerio Felipe is a 53 year old male who presents for preop clearance here with   LEFT KNEE ARTHROSCOPY, PARTIAL MEDIAL MENSICECTOMY AND CHONDROPLASTY     Dr. Altman requesting clearance.  3/8/24  At [facility]   HPI:   Pt complains of chronic left knee pain.  Pt denies any chest pain/sob/dizziness or lightheadedness at rest or with exertion.  No previous reaction or problems with anesthesia.    Wt Readings from Last 6 Encounters:   03/01/24 279 lb (126.6 kg)   02/21/24 282 lb (127.9 kg)   02/19/24 282 lb 9.6 oz (128.2 kg)   01/16/24 276 lb (125.2 kg)   12/22/23 260 lb (117.9 kg)   12/06/23 286 lb (129.7 kg)     Body mass index is 43.7 kg/m².     Cholesterol, Total (mg/dL)   Date Value   12/06/2023 142   07/24/2021 132   11/10/2020 144   01/17/2008 180     CHOLESTEROL, TOTAL (mg/dL)   Date Value   12/01/2022 148     CHOLESTEROL (mg/dL)   Date Value   08/01/2014 145   08/30/2013 160   03/07/2011 161     Cholesterol (mg/dL)   Date Value   11/09/2019 145.00   10/22/2018 148     HDL Cholesterol (mg/dL)   Date Value   12/06/2023 50   07/24/2021 46   11/10/2020 45   01/17/2008 39 (L)     HDL CHOLESTEROL (mg/dL)   Date Value   12/01/2022 59     HDL CHOL (mg/dL)   Date Value   08/01/2014 67   08/30/2013 42 (L)   03/07/2011 38 (L)     Direct HDL (mg/dL)   Date Value   11/09/2019 46 (L)   10/22/2018 87     LDL Cholesterol Calc (mg/dL)   Date Value   01/17/2008 103 (H)     LDL Cholesterol (mg/dL)   Date Value   12/06/2023 75   07/24/2021 60   11/10/2020 74     LDL-CHOLESTEROL (mg/dL (calc))   Date Value   12/01/2022 69     LDL CHOLESTROL (mg/dL)   Date Value   08/01/2014 53   08/30/2013 82   03/07/2011 93     Calculated LDL (mg/dL)   Date Value   11/09/2019 72   10/22/2018 43     Triglycerides (mg/dL)   Date Value   01/17/2008 190 (H)     AST (SGOT) (IU/L)   Date Value   01/17/2008 18   12/11/2006 20     AST (U/L)   Date Value   12/06/2023 14 (L)   04/17/2023 23   12/01/2022 15   10/14/2021 15   11/09/2019 15   10/22/2018  22   08/01/2014 14 (L)   12/30/2013 10 (L)   08/30/2013 17     ALT (SGPT) (IU/L)   Date Value   01/17/2008 40   12/11/2006 25     ALT (U/L)   Date Value   12/06/2023 28   04/17/2023 59   12/01/2022 23   10/14/2021 28   11/09/2019 21   10/22/2018 39   08/01/2014 46   12/30/2013 31   08/30/2013 38      Current Outpatient Medications   Medication Sig Dispense Refill    amphetamine-dextroamphetamine (ADDERALL) 20 MG Oral Tab Take 1 tablet (20 mg total) by mouth 2 (two) times daily. 60 tablet 0    [START ON 3/11/2024] amphetamine-dextroamphetamine (ADDERALL) 20 MG Oral Tab Take 1 tablet (20 mg total) by mouth 2 (two) times daily. 60 tablet 0    [START ON 4/11/2024] amphetamine-dextroamphetamine (ADDERALL) 20 MG Oral Tab Take 1 tablet (20 mg total) by mouth 2 (two) times daily. 60 tablet 0    traMADol 50 MG Oral Tab Please take 1 to 2 tablets every 4 hours as needed for pain. 20 tablet 1    amphetamine-dextroamphetamine (ADDERALL) 20 MG Oral Tab Take 1 tablet (20 mg total) by mouth 2 (two) times daily. 60 tablet 0    spironolactone 50 MG Oral Tab Take 1 tablet (50 mg total) by mouth daily. 90 tablet 0    ATORVASTATIN 10 MG Oral Tab TAKE 1 TABLET(10 MG) BY MOUTH DAILY 90 tablet 0    diclofenac 75 MG Oral Tab EC Take 1 tablet (75 mg total) by mouth 2 (two) times daily as needed. 60 tablet 1    LOSARTAN 100 MG Oral Tab TAKE 1 TABLET(100 MG) BY MOUTH DAILY 90 tablet 0    Dasatinib (SPRYCEL) 100 MG Oral Tab Take 1 tablet by mouth daily.  0    Labetalol HCl 200 MG Oral Tab Take 1 tablet (200 mg total) by mouth 2 (two) times daily. 60 tablet 3    Vitamin B-12 1000 MCG Oral Tab Take 1 tablet (1,000 mcg total) by mouth daily.      Cholecalciferol (VITAMIN D3) 1.25 MG (61938 UT) Oral Cap Take 1 tablet by mouth once a week.      Ascorbic Acid (VITAMIN C) 1000 MG Oral Tab Take 1 tablet (1,000 mg total) by mouth daily.        Past Medical History:   Diagnosis Date    Essential hypertension     High blood pressure     High cholesterol      Leukemia (HCC)     Leukemia (HCC)     Personal history of antineoplastic chemotherapy     oral chemo (Sprycel)    Smoking 2016      Past Surgical History:   Procedure Laterality Date    COLONOSCOPY  2016    diverticulosis, polyps (adenoma/hp). repeat 5 years    COLONOSCOPY N/A 2021    Procedure: COLONOSCOPY;  Surgeon: Fortino Maier MD;  Location:  ENDOSCOPY    COLONOSCOPY,BIOPSY N/A 2016    Procedure: COLONOSCOPY, POSSIBLE BIOPSY, POSSIBLE POLYPECTOMY 03978;  Surgeon: Fotrino Maier MD;  Location: [facility]    COLONOSCOPY,REMV LESN,SNARE N/A 2016    Procedure: COLONOSCOPY, POSSIBLE BIOPSY, POSSIBLE POLYPECTOMY 04738;  Surgeon: Fortino Maier MD;  Location: [facility]    CREATE EARDRUM OPENING,GEN ANESTH  10/07/2021    KNEE ARTHROSCOPY      LAP GASTRIC BYPASS/LORA-EN-Y      OTHER SURGICAL HISTORY      Rotator Cuff Repair left side    OTHER SURGICAL HISTORY Right     meniscus      Family History   Problem Relation Age of Onset    Other (COPD [Other]) Father     Heart Attack Father     Stroke Father     Breast Cancer Mother     Diabetes Mother       Social History:  Social History     Socioeconomic History    Marital status:    Tobacco Use    Smoking status: Former     Packs/day: .5     Types: Cigarettes     Quit date: 8/10/2018     Years since quittin.5    Smokeless tobacco: Never   Vaping Use    Vaping Use: Never used   Substance and Sexual Activity    Alcohol use: Yes     Comment: once per month    Drug use: Yes     Types: Cannabis     Comment: medical marijuana card   Other Topics Concern    Caffeine Concern Yes     Comment: 1 cup a day     Exercise Yes     Comment: 7 x week       Occ: . : . Children: .        REVIEW OF SYSTEMS:   GENERAL: feels well otherwise  SKIN: denies any unusual skin lesions  EYES:denies blurred vision or double vision  HEENT: denies nasal congestion, sinus pain or ST  LUNGS: denies shortness of breath with  exertion  CARDIOVASCULAR: denies chest pain on exertion  GI: denies abdominal pain,denies heartburn  : denies nocturia or changes in stream  MUSCULOSKELETAL: denies back pain  NEURO: denies headaches  PSYCHE: denies depression or anxiety  HEMATOLOGIC: denies hx of anemia  ENDOCRINE: denies thyroid history  ALL/ASTHMA: denies hx of allergy or asthma    EXAM:   /76   Pulse 77   Resp 16   Ht 5' 7\" (1.702 m)   Wt 279 lb (126.6 kg)   SpO2 96%   BMI 43.70 kg/m²   Body mass index is 43.7 kg/m².   GENERAL: well developed, well nourished,in no apparent distress  SKIN: no rashes,no suspicious lesions  HEENT: atraumatic, normocephalic,ears and throat are clear  EYES:PERRLA, EOMI, normal optic disk,conjunctiva are clear  NECK: supple,no adenopathy,no bruits, no JVD  CHEST: no chest tenderness  BREAST: no dominant or suspicious mass  LUNGS: clear to auscultation  CARDIO: RRR without murmur  GI: good BS's,no masses, HSM or tenderness  MUSCULOSKELETAL: back is not tender,FROM of the back  EXTREMITIES: no cyanosis, clubbing or edema  NEURO: Oriented times three,cranial nerves are intact,motor and sensory are grossly intact    ASSESSMENT AND PLAN:   Valerio Felipe is a 53 year old male who presents for preop clearance.     1. Preop cardiovascular exam  CLEARED FOR SURGERY   - EKG 12 Lead performed at [facility]; Future  - CBC With Differential With Platelet  - Comp Metabolic Panel (14)    2. Peripheral tear of medial meniscus of left knee as current injury, subsequent encounter    - EKG 12 Lead performed at [facility]; Future  - CBC With Differential With Platelet  - Comp Metabolic Panel (14)    3. H/O: CML (chronic myeloid leukemia)    - EKG 12 Lead performed at [facility]; Future  - CBC With Differential With Platelet  - Comp Metabolic Panel (14)    4. CHRIS (obstructive sleep apnea)    - EKG 12 Lead performed at [facility]; Future  - CBC With Differential With Platelet  - Comp Metabolic Panel  (14)    5. LAP-BAND surgery status    - EKG 12 Lead performed at [facility]; Future  - CBC With Differential With Platelet  - Comp Metabolic Panel (14)    6. History of adenomatous polyp of colon    - EKG 12 Lead performed at [facility]; Future  - CBC With Differential With Platelet  - Comp Metabolic Panel (14)    7. History of melanoma    - EKG 12 Lead performed at [facility]; Future  - CBC With Differential With Platelet  - Comp Metabolic Panel (14)    8. Former tobacco use    - EKG 12 Lead performed at [facility]; Future  - CBC With Differential With Platelet  - Comp Metabolic Panel (14)    9. Attention deficit disorder (ADD) in adult    - EKG 12 Lead performed at [facility]; Future  - CBC With Differential With Platelet  - Comp Metabolic Panel (14)    Pt is low-moderate risk for a low risk procedure.     RTC 3 mo or sooner if needed.

## 2024-03-04 RX ORDER — ATORVASTATIN CALCIUM 10 MG/1
10 TABLET, FILM COATED ORAL DAILY
Qty: 90 TABLET | Refills: 0 | Status: SHIPPED | OUTPATIENT
Start: 2024-03-04

## 2024-03-04 RX ORDER — SPIRONOLACTONE 50 MG/1
50 TABLET, FILM COATED ORAL DAILY
Qty: 90 TABLET | Refills: 0 | Status: SHIPPED | OUTPATIENT
Start: 2024-03-04

## 2024-03-04 NOTE — TELEPHONE ENCOUNTER
A refill request was received for:  Requested Prescriptions     Pending Prescriptions Disp Refills    SPIRONOLACTONE 50 MG Oral Tab [Pharmacy Med Name: SPIRONOLACTONE 50MG TABLETS] 90 tablet 0     Sig: TAKE 1 TABLET(50 MG) BY MOUTH DAILY    ATORVASTATIN 10 MG Oral Tab [Pharmacy Med Name: ATORVASTATIN 10MG TABLETS] 90 tablet 0     Sig: TAKE 1 TABLET(10 MG) BY MOUTH DAILY       Last refill date:   11/15/2023    Last office visit: 03/01/2024    Follow up due:  Future Appointments   Date Time Provider Department Center   3/18/2024 11:40 AM Cassidy Roberts PA-C [facility]   7/16/2024 10:30 AM Ayse Luo DO [facility] [address]

## 2024-03-08 ENCOUNTER — TELEPHONE (OUTPATIENT)
Dept: FAMILY MEDICINE CLINIC | Facility: CLINIC | Age: 54
End: 2024-03-08

## 2024-03-08 RX ORDER — LOSARTAN POTASSIUM 100 MG/1
100 TABLET ORAL DAILY
Qty: 90 TABLET | Refills: 0 | Status: SHIPPED | OUTPATIENT
Start: 2024-03-08

## 2024-03-08 NOTE — TELEPHONE ENCOUNTER
Pt said he's waiting on LOSARTAN 100 MG refill - requested with other meds recently - [facility] abins & book

## 2024-03-18 ENCOUNTER — OFFICE VISIT (OUTPATIENT)
Dept: ORTHOPEDICS CLINIC | Facility: CLINIC | Age: 54
End: 2024-03-18
Payer: COMMERCIAL

## 2024-03-18 VITALS — WEIGHT: 275 LBS | BODY MASS INDEX: 43.16 KG/M2 | HEIGHT: 67 IN

## 2024-03-18 DIAGNOSIS — Z48.89 AFTERCARE FOLLOWING SURGERY: Primary | ICD-10-CM

## 2024-03-18 DIAGNOSIS — Z98.890 STATUS POST ARTHROSCOPY OF LEFT KNEE: ICD-10-CM

## 2024-03-18 PROCEDURE — 99024 POSTOP FOLLOW-UP VISIT: CPT | Performed by: PHYSICIAN ASSISTANT

## 2024-03-18 NOTE — PROGRESS NOTES
[facility] Post Op Progress Note    Date of Surgery: 03/08/2024      Subjective: Valerio Felipe is a 53 year old male who is here for follow-up of his left knee.  He underwent left knee arthroscopy with partial medial meniscectomy and chondroplasty and excision of medial plica by Dr. Altman approximately 10 days ago.  He states overall he is doing much better than prior to surgery.  He has some achiness but the pain prior to surgery has significantly improved.  He does note some stiffness but overall is doing much better.  He denies calf pain.      Objective: Exam of the left knee and lower extremity reveals that the portals are clean, dry, and intact and healing well.  He has full extension and flexion to 110 degrees.  He has no significant effusion.  Sensation is present to light touch.  No calf tenderness upon palpation.  Negative Homans' sign.      Assessment: Status post left knee arthroscopy with partial medial meniscectomy, chondroplasty patellofemoral compartment, partial synovectomy and excision of medial plica      Plan: Surgical findings were discussed with the patient.  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  Overall he is progressing well and will continue to work on range of motion exercises on his own.  Offered formal therapy but he declined for now.  He will call if he would like to initiate formal PT.  He will slowly advance with activities as tolerated.  Wound care was discussed and he will avoid submersion of the wounds for an additional 2 to 3 weeks.  Like to see him back in 3 weeks in hopes of resolution of pain and full range of motion with normal gait.  He will follow-up sooner with questions, concerns, or worsening symptoms.    Student was present during the visit after the patient's consent.      Cassidy Roberts, [facility], PA-C Orthopedic Surgery   [address] [place] [address]   t: [phone]   f: [phone]

## 2024-04-07 DIAGNOSIS — M17.11 PRIMARY OSTEOARTHRITIS OF RIGHT KNEE: ICD-10-CM

## 2024-04-08 RX ORDER — DICLOFENAC SODIUM 75 MG/1
75 TABLET, DELAYED RELEASE ORAL 2 TIMES DAILY PRN
Qty: 60 TABLET | Refills: 1 | Status: SHIPPED | OUTPATIENT
Start: 2024-04-08

## 2024-04-10 ENCOUNTER — OFFICE VISIT (OUTPATIENT)
Dept: ORTHOPEDICS CLINIC | Facility: CLINIC | Age: 54
End: 2024-04-10
Payer: COMMERCIAL

## 2024-04-10 VITALS — BODY MASS INDEX: 43.16 KG/M2 | HEIGHT: 67 IN | WEIGHT: 275 LBS

## 2024-04-10 DIAGNOSIS — Z98.890 STATUS POST ARTHROSCOPY OF LEFT KNEE: ICD-10-CM

## 2024-04-10 DIAGNOSIS — Z48.89 AFTERCARE FOLLOWING SURGERY: Primary | ICD-10-CM

## 2024-04-10 PROCEDURE — 99024 POSTOP FOLLOW-UP VISIT: CPT | Performed by: PHYSICIAN ASSISTANT

## 2024-04-10 NOTE — PROGRESS NOTES
[facility] Post Op Progress Note    Date of Surgery: 03/08/2024      Subjective: Valerio Felipe is a 53 year old male who is here for follow-up of his left knee.  He underwent left knee arthroscopy with partial medial meniscectomy and chondroplasty and excision of medial plica by Dr. Altman approximately 1 month ago.  He reports normalizing range of motion and decreasing pain.  His only complaint is some soreness in the anterior aspect of the knee with sitting for long periods of time and going up and down stairs.  Otherwise he is doing much better.      Objective: Exam of the left knee and lower extremity reveals that the portals are well-healed.  He has full extension and full flexion without pain.  Trace palpable effusion.  Sensation is present to light touch.  He ambulates without antalgia      Assessment: Status post left knee arthroscopy with partial medial meniscectomy and chondroplasty and excision of medial plica       Plan: Overall he is doing very well with normal range of motion and minimal pain.  I explained that he did have some chondromalacia at the patellofemoral joint which may cause occasional soreness especially with sitting for long periods of time and going up and down stairs.  He is losing weight as well which will help.  He will continue to slowly advance with activities as tolerated.  He will follow-up with us on an as-needed basis with any recurrent or persistent symptoms, questions or concerns.      Cassidy Roberts, [facility], PA-C Orthopedic Surgery   [address] [place] [id number]   t: [phone]  f: [phone]

## 2024-04-17 ENCOUNTER — E-VISIT (OUTPATIENT)
Dept: TELEHEALTH | Age: 54
End: 2024-04-17
Payer: COMMERCIAL

## 2024-04-17 DIAGNOSIS — J01.90 ACUTE SINUSITIS WITH SYMPTOMS GREATER THAN 10 DAYS: Primary | ICD-10-CM

## 2024-04-17 RX ORDER — AMOXICILLIN AND CLAVULANATE POTASSIUM 875; 125 MG/1; MG/1
1 TABLET, FILM COATED ORAL 2 TIMES DAILY
Qty: 14 TABLET | Refills: 0 | Status: SHIPPED | OUTPATIENT
Start: 2024-04-17 | End: 2024-04-24

## 2024-04-17 NOTE — PROGRESS NOTES
Valerio Felipe is a 53 year old male.  HPI:   See answers to questions above.     Current Outpatient Medications   Medication Sig Dispense Refill    amoxicillin clavulanate 875-125 MG Oral Tab Take 1 tablet by mouth 2 (two) times daily for 7 days. 14 tablet 0    diclofenac 75 MG Oral Tab EC Take 1 tablet (75 mg total) by mouth 2 (two) times daily as needed. 60 tablet 1    acetaminophen-codeine 300-30 MG Oral Tab Take 1 tablet by mouth every 6 (six) hours as needed for Pain. 20 tablet 0    losartan 100 MG Oral Tab Take 1 tablet (100 mg total) by mouth daily. 90 tablet 0    spironolactone 50 MG Oral Tab Take 1 tablet (50 mg total) by mouth daily. 90 tablet 0    atorvastatin 10 MG Oral Tab Take 1 tablet (10 mg total) by mouth daily. 90 tablet 0    amphetamine-dextroamphetamine (ADDERALL) 20 MG Oral Tab Take 1 tablet (20 mg total) by mouth 2 (two) times daily. 60 tablet 0    traMADol 50 MG Oral Tab Please take 1 to 2 tablets every 4 hours as needed for pain. 20 tablet 1    amphetamine-dextroamphetamine (ADDERALL) 20 MG Oral Tab Take 1 tablet (20 mg total) by mouth 2 (two) times daily. 60 tablet 0    Dasatinib (SPRYCEL) 100 MG Oral Tab Take 1 tablet by mouth daily.  0    Labetalol HCl 200 MG Oral Tab Take 1 tablet (200 mg total) by mouth 2 (two) times daily. 60 tablet 3    Vitamin B-12 1000 MCG Oral Tab Take 1 tablet (1,000 mcg total) by mouth daily.      Cholecalciferol (VITAMIN D3) 1.25 MG (60045 UT) Oral Cap Take 1 tablet by mouth once a week.      Ascorbic Acid (VITAMIN C) 1000 MG Oral Tab Take 1 tablet (1,000 mg total) by mouth daily.        Past Medical History:    Essential hypertension    High blood pressure    High cholesterol    Leukemia (HCC)    Leukemia (HCC)    Personal history of antineoplastic chemotherapy    oral chemo (Sprycel)    Smoking      Past Surgical History:   Procedure Laterality Date    Colonoscopy  07/2016    diverticulosis, polyps (adenoma/hp). repeat 5 years    Colonoscopy N/A 05/17/2021     Procedure: COLONOSCOPY;  Surgeon: Fortino Maier MD;  Location:  ENDOSCOPY    Colonoscopy,biopsy N/A 2016    Procedure: COLONOSCOPY, POSSIBLE BIOPSY, POSSIBLE POLYPECTOMY 68096;  Surgeon: Fortino Maier MD;  Location: [facility]    Colonoscopy,remv lesn,snare N/A 2016    Procedure: COLONOSCOPY, POSSIBLE BIOPSY, POSSIBLE POLYPECTOMY 41876;  Surgeon: Fortino Maier MD;  Location: [facility]    Create eardrum opening,gen anesth  10/07/2021    Knee arthroscopy      Lap gastric bypass/hernando-en-y      Other surgical history      Rotator Cuff Repair left side    Other surgical history Right     meniscus      Family History   Problem Relation Age of Onset    Other (COPD [Other]) Father     Heart Attack Father     Stroke Father     Breast Cancer Mother     Diabetes Mother       Social History:  Social History     Socioeconomic History    Marital status:    Tobacco Use    Smoking status: Former     Current packs/day: 0.00     Types: Cigarettes     Quit date: 8/10/2018     Years since quittin.6    Smokeless tobacco: Never   Vaping Use    Vaping status: Never Used   Substance and Sexual Activity    Alcohol use: Yes     Comment: once per month    Drug use: Yes     Types: Cannabis     Comment: medical marijuana card   Other Topics Concern    Caffeine Concern Yes     Comment: 1 cup a day     Exercise Yes     Comment: 7 x week      Social Determinants of Health     Physical Activity: Inactive (2019)    Received from [facility], [facility]    Exercise Vital Sign     Days of Exercise per Week: 0 days     Minutes of Exercise per Session: 0 min    Received from [facility], [facility]    Social Connections    [facility] [employer] [facility]         ASSESSMENT AND PLAN:     Encounter Diagnosis   Name Primary?    Acute sinusitis with symptoms greater than 10 days Yes           Meds &  Refills for this Visit:  Requested Prescriptions     Signed Prescriptions Disp Refills    amoxicillin clavulanate 875-125 MG Oral Tab 14 tablet 0     Sig: Take 1 tablet by mouth 2 (two) times daily for 7 days.       Duration of  the service:  5 minutes    Patient advised to follow up with PCP if no improvement or worsening of symptoms  Refer to MyChart message for specific patient instructions

## 2024-05-17 ENCOUNTER — APPOINTMENT (OUTPATIENT)
Dept: CT IMAGING | Age: 54
DRG: 392 | End: 2024-05-17
Attending: EMERGENCY MEDICINE

## 2024-05-17 ENCOUNTER — HOSPITAL ENCOUNTER (INPATIENT)
Facility: HOSPITAL | Age: 54
LOS: 3 days | Discharge: HOME | DRG: 392 | End: 2024-05-20
Attending: EMERGENCY MEDICINE | Admitting: HOSPITALIST

## 2024-05-17 DIAGNOSIS — K57.92 ACUTE DIVERTICULITIS: Primary | ICD-10-CM

## 2024-05-17 LAB
ALBUMIN SERPL-MCNC: 3.6 G/DL (ref 3.4–5)
ALBUMIN/GLOB SERPL: 0.9 {RATIO} (ref 1–2)
ALP LIVER SERPL-CCNC: 97 U/L
ALT SERPL-CCNC: 25 U/L
ANION GAP SERPL CALC-SCNC: 5 MMOL/L (ref 0–18)
AST SERPL-CCNC: 13 U/L (ref 15–37)
BASOPHILS # BLD AUTO: 0.08 X10(3) UL (ref 0–0.2)
BASOPHILS NFR BLD AUTO: 0.6 %
BILIRUB SERPL-MCNC: 0.4 MG/DL (ref 0.1–2)
BUN BLD-MCNC: 24 MG/DL (ref 9–23)
CALCIUM BLD-MCNC: 9.3 MG/DL (ref 8.5–10.1)
CHLORIDE SERPL-SCNC: 108 MMOL/L (ref 98–112)
CO2 SERPL-SCNC: 24 MMOL/L (ref 21–32)
CREAT BLD-MCNC: 1.14 MG/DL
EGFRCR SERPLBLD CKD-EPI 2021: 76 ML/MIN/1.73M2 (ref 60–?)
EOSINOPHIL # BLD AUTO: 0.24 X10(3) UL (ref 0–0.7)
EOSINOPHIL NFR BLD AUTO: 1.7 %
ERYTHROCYTE [DISTWIDTH] IN BLOOD BY AUTOMATED COUNT: 14.1 %
GLOBULIN PLAS-MCNC: 4.1 G/DL (ref 2.8–4.4)
GLUCOSE BLD-MCNC: 120 MG/DL (ref 70–99)
HCT VFR BLD AUTO: 33.5 %
HGB BLD-MCNC: 11.3 G/DL
IMM GRANULOCYTES # BLD AUTO: 0.06 X10(3) UL (ref 0–1)
IMM GRANULOCYTES NFR BLD: 0.4 %
LIPASE SERPL-CCNC: 25 U/L (ref 13–75)
LYMPHOCYTES # BLD AUTO: 1.9 X10(3) UL (ref 1–4)
LYMPHOCYTES NFR BLD AUTO: 13.4 %
MCH RBC QN AUTO: 31 PG (ref 26–34)
MCHC RBC AUTO-ENTMCNC: 33.7 G/DL (ref 31–37)
MCV RBC AUTO: 92 FL
MONOCYTES # BLD AUTO: 1.8 X10(3) UL (ref 0.1–1)
MONOCYTES NFR BLD AUTO: 12.7 %
NEUTROPHILS # BLD AUTO: 10.06 X10 (3) UL (ref 1.5–7.7)
NEUTROPHILS # BLD AUTO: 10.06 X10(3) UL (ref 1.5–7.7)
NEUTROPHILS NFR BLD AUTO: 71.2 %
OSMOLALITY SERPL CALC.SUM OF ELEC: 289 MOSM/KG (ref 275–295)
PLATELET # BLD AUTO: 188 10(3)UL (ref 150–450)
POTASSIUM SERPL-SCNC: 4.4 MMOL/L (ref 3.5–5.1)
PROT SERPL-MCNC: 7.7 G/DL (ref 6.4–8.2)
RBC # BLD AUTO: 3.64 X10(6)UL
SODIUM SERPL-SCNC: 137 MMOL/L (ref 136–145)
WBC # BLD AUTO: 14.1 X10(3) UL (ref 4–11)

## 2024-05-17 PROCEDURE — 99223 1ST HOSP IP/OBS HIGH 75: CPT | Performed by: HOSPITALIST

## 2024-05-17 PROCEDURE — 74177 CT ABD & PELVIS W/CONTRAST: CPT | Performed by: EMERGENCY MEDICINE

## 2024-05-17 RX ORDER — ONDANSETRON 2 MG/ML
4 INJECTION INTRAMUSCULAR; INTRAVENOUS EVERY 6 HOURS PRN
Status: DISCONTINUED | OUTPATIENT
Start: 2024-05-17 | End: 2024-05-20

## 2024-05-17 RX ORDER — METRONIDAZOLE 500 MG/100ML
500 INJECTION, SOLUTION INTRAVENOUS ONCE
Status: COMPLETED | OUTPATIENT
Start: 2024-05-17 | End: 2024-05-17

## 2024-05-17 RX ORDER — ATORVASTATIN CALCIUM 10 MG/1
10 TABLET, FILM COATED ORAL DAILY
Status: DISCONTINUED | OUTPATIENT
Start: 2024-05-18 | End: 2024-05-20

## 2024-05-17 RX ORDER — ACETAMINOPHEN 500 MG
500 TABLET ORAL EVERY 4 HOURS PRN
Status: DISCONTINUED | OUTPATIENT
Start: 2024-05-17 | End: 2024-05-20

## 2024-05-17 RX ORDER — PROCHLORPERAZINE EDISYLATE 5 MG/ML
5 INJECTION INTRAMUSCULAR; INTRAVENOUS EVERY 8 HOURS PRN
Status: DISCONTINUED | OUTPATIENT
Start: 2024-05-17 | End: 2024-05-20

## 2024-05-17 RX ORDER — LABETALOL 200 MG/1
200 TABLET, FILM COATED ORAL 2 TIMES DAILY
Status: DISCONTINUED | OUTPATIENT
Start: 2024-05-17 | End: 2024-05-20

## 2024-05-17 RX ORDER — HYDROMORPHONE HYDROCHLORIDE 1 MG/ML
1 INJECTION, SOLUTION INTRAMUSCULAR; INTRAVENOUS; SUBCUTANEOUS EVERY 30 MIN PRN
Status: DISCONTINUED | OUTPATIENT
Start: 2024-05-17 | End: 2024-05-17

## 2024-05-17 RX ORDER — SODIUM CHLORIDE 9 MG/ML
INJECTION, SOLUTION INTRAVENOUS CONTINUOUS
Status: DISCONTINUED | OUTPATIENT
Start: 2024-05-17 | End: 2024-05-20

## 2024-05-17 RX ORDER — MELATONIN
3 NIGHTLY PRN
Status: DISCONTINUED | OUTPATIENT
Start: 2024-05-17 | End: 2024-05-20

## 2024-05-17 RX ORDER — SENNOSIDES 8.6 MG
17.2 TABLET ORAL NIGHTLY PRN
Status: DISCONTINUED | OUTPATIENT
Start: 2024-05-17 | End: 2024-05-20

## 2024-05-17 RX ORDER — ENEMA 19; 7 G/133ML; G/133ML
1 ENEMA RECTAL ONCE AS NEEDED
Status: DISCONTINUED | OUTPATIENT
Start: 2024-05-17 | End: 2024-05-20

## 2024-05-17 RX ORDER — HYDROMORPHONE HYDROCHLORIDE 1 MG/ML
0.8 INJECTION, SOLUTION INTRAMUSCULAR; INTRAVENOUS; SUBCUTANEOUS EVERY 2 HOUR PRN
Status: DISCONTINUED | OUTPATIENT
Start: 2024-05-17 | End: 2024-05-20

## 2024-05-17 RX ORDER — BISACODYL 10 MG
10 SUPPOSITORY, RECTAL RECTAL
Status: DISCONTINUED | OUTPATIENT
Start: 2024-05-17 | End: 2024-05-20

## 2024-05-17 RX ORDER — HEPARIN SODIUM 5000 [USP'U]/ML
7500 INJECTION, SOLUTION INTRAVENOUS; SUBCUTANEOUS EVERY 12 HOURS SCHEDULED
Status: DISCONTINUED | OUTPATIENT
Start: 2024-05-17 | End: 2024-05-20

## 2024-05-17 RX ORDER — SODIUM CHLORIDE 9 MG/ML
INJECTION, SOLUTION INTRAVENOUS CONTINUOUS
Status: ACTIVE | OUTPATIENT
Start: 2024-05-17 | End: 2024-05-17

## 2024-05-17 RX ORDER — HYDROMORPHONE HYDROCHLORIDE 1 MG/ML
1 INJECTION, SOLUTION INTRAMUSCULAR; INTRAVENOUS; SUBCUTANEOUS ONCE
Status: COMPLETED | OUTPATIENT
Start: 2024-05-17 | End: 2024-05-17

## 2024-05-17 RX ORDER — POLYETHYLENE GLYCOL 3350 17 G/17G
17 POWDER, FOR SOLUTION ORAL DAILY PRN
Status: DISCONTINUED | OUTPATIENT
Start: 2024-05-17 | End: 2024-05-20

## 2024-05-17 RX ORDER — HYDROMORPHONE HYDROCHLORIDE 1 MG/ML
1 INJECTION, SOLUTION INTRAMUSCULAR; INTRAVENOUS; SUBCUTANEOUS
Status: DISCONTINUED | OUTPATIENT
Start: 2024-05-17 | End: 2024-05-17 | Stop reason: DRUGHIGH

## 2024-05-17 RX ORDER — MORPHINE SULFATE 4 MG/ML
4 INJECTION, SOLUTION INTRAMUSCULAR; INTRAVENOUS ONCE
Status: COMPLETED | OUTPATIENT
Start: 2024-05-17 | End: 2024-05-17

## 2024-05-17 RX ORDER — HYDROMORPHONE HYDROCHLORIDE 1 MG/ML
0.4 INJECTION, SOLUTION INTRAMUSCULAR; INTRAVENOUS; SUBCUTANEOUS EVERY 2 HOUR PRN
Status: DISCONTINUED | OUTPATIENT
Start: 2024-05-17 | End: 2024-05-20

## 2024-05-17 RX ORDER — HYDROMORPHONE HYDROCHLORIDE 1 MG/ML
0.2 INJECTION, SOLUTION INTRAMUSCULAR; INTRAVENOUS; SUBCUTANEOUS EVERY 2 HOUR PRN
Status: DISCONTINUED | OUTPATIENT
Start: 2024-05-17 | End: 2024-05-20

## 2024-05-17 NOTE — ED QUICK NOTES
Orders for admission, patient is aware of plan and ready to go upstairs. Any questions, please call ED RN Charles  at extension [phone].     Vaccinated? Yes   Type of COVID test sent: none  COVID Suspicion level: Low      Titratable drug(s) infusing: Flagyl 100 ml/hr  0.9  ml/hr   Rate:    LOC at time of transport: A+Ox3    Other pertinent information: none    CIWA score= n/a  NIH score=  n/a

## 2024-05-17 NOTE — RESPIRATORY THERAPY NOTE
PATIENT HAS HISTORY OF CHRIS. PATIENT REFUSES TO USE CPAP DURING HOSPITAL STAY. CHRIS PROTOCOL IN CHART.

## 2024-05-17 NOTE — PAYOR COMM NOTE
--------------  ADMISSION REVIEW     Payor: [facility] CORE/NAVIGATE  Subscriber #:  [id number]  Authorization Number: [id number]    Admit date: 5/17/24  Admit time: 12:19 PM         History and Physical   History of Present Illness:    Valerio Felipe is a 53 year old male with PMHx HTN, HLD, CML, hx of gastric bypass presents to ER w/ complains of abd pain.  Pain started 2-3 days ago. Denies n/v/diarrhea/constipation. No cp or sob. Pt was hoping it would get better but pain kept coming back w/ food. Pt no longer can really tolerating fluids or food now.     /62 (BP Location: Right arm)   Pulse 67   Temp 97.2 °F (36.2 °C) (Oral)   Resp 18   Ht 5' 7\" (1.702 m)   Wt 260 lb (117.9 kg)   SpO2 95%   BMI 40.72 kg/m²   General: Alert  Respiratory: No rhonchi, no wheezes  Cardiovascular: S1, S2. Regular rate and rhythm  Abdomen: Soft, midly-tender, non-distended, positive bowel sounds  Neuro: No new focal deficits  Extremities: No edema     Lab 05/17/24  0743   RBC 3.64*   HGB 11.3*   HCT 33.5*   MCV 92.0   MCH 31.0   MCHC 33.7   RDW 14.1   NEPRELIM 10.06*   WBC 14.1*   .0      Lab 05/17/24  0743   *   BUN 24*   CREATSERUM 1.14   EGFRCR 76   CA 9.3   ALB 3.6      K 4.4      CO2 24.0   ALKPHO 97   AST 13*   ALT 25   BILT 0.4   TP 7.7    Assessment & Plan:       #Acute diverticulitis without abscess  -IV Zosyn, pain control, IV fluids  -gets colonoscopies every 2y, advised to fu w/ [facility] as [facility]      #Essential hypertension  -bp soft, sys in 100s, resume labetalol w/ holding parameters      #Hyperlipidemia     #CML  -primary oncologist at outside hospital   -PTA: Dasatinib     #History of gastric bypass surgery     #Morbid obesity  -BMI 40.72        MEDICATIONS ADMINISTERED IN LAST 1 DAY:  cefTRIAXone (Rocephin) 2 g in D5W 100 mL IVPB-ADD       Date Action Dose Route User    5/17/2024 1013 New Bag 2 g Intravenous Charles Alvarenga, RN          HYDROmorphone (Dilaudid) 1 MG/ML injection 1  mg       Date Action Dose Route User    5/17/2024 0850 Given 1 mg Intravenous Charles Alvarenga RN          HYDROmorphone (Dilaudid) 1 MG/ML injection 1 mg       Date Action Dose Route User    5/17/2024 1143 Given 1 mg Intravenous Charles Alvarenga RN    5/17/2024 1032 Given 1 mg Intravenous Charles Alvarenga RN          HYDROmorphone (Dilaudid) 1 MG/ML injection 0.8 mg       Date Action Dose Route User    5/17/2024 1342 Given 0.8 mg Intravenous Sherrill Castillo RN          iopamidol 76% (ISOVUE-370) injection for power injector       Date Action Dose Route User    5/17/2024 0855 Given 100 mL Intravenous Inamine, [place] G          metRONIDAZOLE in sodium chloride 0.79% (Flagyl) 5 mg/mL IVPB premix 500 mg       Date Action Dose Route User    5/17/2024 1101 New Bag 500 mg Intravenous Charles Alvarenga RN          morphINE PF 4 MG/ML injection 4 mg       Date Action Dose Route User    5/17/2024 0808 Given 4 mg Intravenous Charles Alvarenga RN          ondansetron (Zofran) 4 MG/2ML injection 4 mg       Date Action Dose Route User    5/17/2024 1340 Given 4 mg Intravenous Sherrill Castillo RN          piperacillin-tazobactam (Zosyn) 3.375 g in dextrose 5% 100 mL IVPB-ADDV       Date Action Dose Route User    5/17/2024 1403 New Bag 3.375 g Intravenous Chetna Melchor RN          sodium chloride 0.9% infusion       Date Action Dose Route User    5/17/2024 1315 Restarted (none) Intravenous Chetna Melchor RN          sodium chloride 0.9 % IV bolus 1,000 mL       Date Action Dose Route User    5/17/2024 0809 New Bag 1,000 mL Intravenous Charles Alvarenga RN            Vitals (last day)       Date/Time Temp Pulse Resp BP SpO2 Weight O2 Device O2 Flow Rate (L/min) [facility]    05/17/24 1300 97.2 °F (36.2 °C) 67 18 106/62 -- -- None (Room air) -- AC    05/17/24 0849 -- 72 18 134/64 97 % -- None (Room air) -- BR    05/17/24 0739 97.4 °F (36.3 °C) 71 18 136/66 98 % 260 lb None (Room air) -- BR

## 2024-05-17 NOTE — ED PROVIDER NOTES
Patient Seen in: [facility] [place]      History     Chief Complaint   Patient presents with    Abdomen/Flank Pain     Stated Complaint: LLQ pain for a couple days    Subjective:   HPI  Patient is a 54 yo M with a history of HTN, HLD, leukemia on oral chemo who presents to ED for evaluation of LLQ intermittently over past 3-4 days that worsened over last night.  Notes that pain was initially a soreness but then felt like someone was punching him last night and became more constant and severe.  No alleviating factors.  Patient reports pain is worse with movement and standing.  Denies any history of similar pains.  But denies any fevers, chills, nausea or vomiting, diarrhea, urinary symptoms, or testicular swelling/pain.  Patient reports hard stool today.  Denies any history of abdominal surgeries in the past.    Objective:   Past Medical History:    Essential hypertension    High blood pressure    High cholesterol    Leukemia (HCC)    Leukemia (HCC)    Personal history of antineoplastic chemotherapy    oral chemo (Sprycel)    Smoking              Past Surgical History:   Procedure Laterality Date    Colonoscopy  07/2016    diverticulosis, polyps (adenoma/hp). repeat 5 years    Colonoscopy N/A 05/17/2021    Procedure: COLONOSCOPY;  Surgeon: Fortino Maier MD;  Location:  ENDOSCOPY    Colonoscopy,biopsy N/A 07/11/2016    Procedure: COLONOSCOPY, POSSIBLE BIOPSY, POSSIBLE POLYPECTOMY 30233;  Surgeon: Fortino Maier MD;  Location: [facility]    Colonoscopy,remv lesn,snare N/A 07/11/2016    Procedure: COLONOSCOPY, POSSIBLE BIOPSY, POSSIBLE POLYPECTOMY 75615;  Surgeon: Fortino Maier MD;  Location: [facility]    Create eardrum opening,gen anesth  10/07/2021    Knee arthroscopy      Lap gastric bypass/hernando-en-y      Other surgical history  2005    Rotator Cuff Repair left side    Other surgical history Right     meniscus                Social History     Socioeconomic History     Marital status:    Tobacco Use    Smoking status: Former     Current packs/day: 0.00     Types: Cigarettes     Quit date: 8/10/2018     Years since quittin.7    Smokeless tobacco: Never   Vaping Use    Vaping status: Never Used   Substance and Sexual Activity    Alcohol use: Yes     Comment: once per month    Drug use: Yes     Types: Cannabis     Comment: medical marijuana card   Other Topics Concern    Caffeine Concern Yes     Comment: 1 cup a day     Exercise Yes     Comment: 7 x week      Social Determinants of Health     Physical Activity: Inactive (2019)    [facility]    Exercise Vital Sign     Days of Exercise per Week: 0 days     Minutes of Exercise per Session: 0 min    [facility]    Social Connections    [facility]    [facility]              Review of Systems    Positive for stated complaint: LLQ pain for a couple days  Other systems are as noted in HPI.  Constitutional and vital signs reviewed.      All other systems reviewed and negative except as noted above.    Physical Exam     ED Triage Vitals   BP 24 0739 136/66   Pulse 24 0739 71   Resp 24 0739 18   Temp 24 0739 97.4 °F (36.3 °C)   Temp src 24 1300 Oral   SpO2 24 0739 98 %   O2 Device 24 0739 None (Room air)       Current Vitals:   Vital Signs  BP: 106/62  Pulse: 67  Resp: 18  Temp: 97.2 °F (36.2 °C)  Temp src: Oral  MAP (mmHg): 74    Oxygen Therapy  SpO2: 95 %  O2 Device: None (Room air)            Physical Exam  Vitals and nursing note reviewed.   Constitutional:       General: He is not in acute distress.  HENT:      Head: Normocephalic and atraumatic.      Nose: Nose normal.      Mouth/Throat:      Mouth: Mucous membranes are moist.   Eyes:      Extraocular Movements: Extraocular movements intact.      Pupils: Pupils are equal,  round, and reactive to light.   Cardiovascular:      Rate and Rhythm: Normal rate and regular rhythm.      Pulses: Normal pulses.   Pulmonary:      Effort: Pulmonary effort is normal. No respiratory distress.      Breath sounds: No wheezing.   Abdominal:      General: There is no distension.      Palpations: Abdomen is soft.      Tenderness: There is abdominal tenderness in the suprapubic area, left upper quadrant and left lower quadrant.   Musculoskeletal:         General: No swelling. Normal range of motion.      Cervical back: Normal range of motion.   Skin:     General: Skin is warm and dry.      Capillary Refill: Capillary refill takes less than 2 seconds.   Neurological:      General: No focal deficit present.      Mental Status: He is alert.   Psychiatric:         Mood and Affect: Mood normal.             ED Course     Labs Reviewed   COMP METABOLIC PANEL (14) - Abnormal; Notable for the following components:       Result Value    Glucose 120 (*)     BUN 24 (*)     AST 13 (*)     A/G Ratio 0.9 (*)     All other components within normal limits   CBC W/ DIFFERENTIAL - Abnormal; Notable for the following components:    WBC 14.1 (*)     RBC 3.64 (*)     HGB 11.3 (*)     HCT 33.5 (*)     Neutrophil Absolute Prelim 10.06 (*)     Neutrophil Absolute 10.06 (*)     Monocyte Absolute 1.80 (*)     All other components within normal limits   LIPASE - Normal   CBC WITH DIFFERENTIAL WITH PLATELET    Narrative:     The following orders were created for panel order CBC With Differential With Platelet.  Procedure                               Abnormality         Status                     ---------                               -----------         ------                     CBC W/ DIFFERENTIAL[[id number]]          Abnormal            Final result                 Please view results for these tests on the individual orders.   URINALYSIS, ROUTINE   RAINBOW DRAW LAVENDER   RAINBOW DRAW LIGHT GREEN          ED Course as of  05/17/24 1803  ------------------------------------------------------------  Time: 05/17 0841  Comment: This show leukocytosis of 14.1, hemoglobin 11.3, platelets normal.  CMP overall unremarkable.  Lipase normal.  ------------------------------------------------------------  Time: 05/17 0858  Comment: Pt still c/o pain, ordered IV dilaudid.   ------------------------------------------------------------  Time: 05/17 0936  Comment: CONCLUSION:       1.  Acute diverticulitis involving the distal descending colon without abscess.      2. Stable right lower lobe pulmonary nodule consistent with granulomatous disease.     ------------------------------------------------------------  Time: 05/17 1004  Comment: Pt re-evaluated, still c/o significant pain. Ordered antibiotics.   ------------------------------------------------------------  Time: 05/17 1026  Comment: Patient given another dose of dilaudid due to persistent severe LLQ abd pain. Given severity of pain, patient was admitted to hospitalist and transferred to [facility] for further management.             MDM      Patient is a 52 yo M with a history of HTN, HLD, leukemia on oral chemo who presents to ED for evaluation of LLQ intermittently over past 3-4 days that worsened over last night.  Patient is afebrile, HDS, satting well on RA. On exam patient has LLQ, LUQ and suprapubic TTP. No CVT.  Differential diagnosis includes but not limited to diverticulitis, pyelonephritis/UTI, kidney stone, muscular strain. Will obtain labs, UA, CT abd/pelvis. Will give IVF, morphine and re-evaluate.     ED Course as of 05/17/24 1803  ------------------------------------------------------------  Time: 05/17 0841  Comment: This show leukocytosis of 14.1, hemoglobin 11.3, platelets normal.  CMP overall unremarkable.  Lipase normal.  ------------------------------------------------------------  Time: 05/17 0858  Comment: Pt still c/o pain, ordered IV dilaudid.    ------------------------------------------------------------  Time: 05/17 0936  Comment: CONCLUSION:       1.  Acute diverticulitis involving the distal descending colon without abscess.      2. Stable right lower lobe pulmonary nodule consistent with granulomatous disease.     ------------------------------------------------------------  Time: 05/17 1004  Comment: Pt re-evaluated, still c/o significant pain. Ordered antibiotics.   ------------------------------------------------------------  Time: 05/17 1026  Comment: Patient given another dose of dilaudid due to persistent severe LLQ abd pain. Given severity of pain, patient was admitted to hospitalist and transferred to [facility] for further management.        Admission disposition: 5/17/2024 10:26 AM                 Disposition and Plan     Clinical Impression:  1. Acute diverticulitis         Disposition:  Admit  5/17/2024 10:26 am    Follow-up:  No follow-up provider specified.        Medications Prescribed:  Current Discharge Medication List          Rain Padron DO  Emergency Medicine Physician                    Hospital Problems       Present on Admission  Date Reviewed: 4/10/2024            ICD-10-CM Noted POA    * (Principal) Acute diverticulitis K57.92 5/17/2024 Unknown

## 2024-05-17 NOTE — H&P
[facility]     Valerio Felipe Patient Status:  Inpatient    1970 MRN [id number]   Location [facility] 0SW-A Attending Radha Rees,    Hosp Day # 0 PCP Ayse Luo DO     Chief Complaint: \"Left lower quadrant pain\"    Subjective:    History of Present Illness:     Valerio Felipe is a 53 year old male with PMHx HTN, HLD, CML, hx of gastric bypass presents to ER w/ complains of abd pain.  Pain started 2-3 days ago. Denies n/v/diarrhea/constipation. No cp or sob. Pt was hoping it would get better but pain kept coming back w/ food. Pt no longer can really tolerating fluids or food now.             History/Other:    Past Medical History:  Past Medical History:    Essential hypertension    High blood pressure    High cholesterol    Leukemia (HCC)    Leukemia (HCC)    Personal history of antineoplastic chemotherapy    oral chemo (Sprycel)    Smoking     Past Surgical History:   Past Surgical History:   Procedure Laterality Date    Colonoscopy  2016    diverticulosis, polyps (adenoma/hp). repeat 5 years    Colonoscopy N/A 2021    Procedure: COLONOSCOPY;  Surgeon: Fortino Maier MD;  Location:  ENDOSCOPY    Colonoscopy,biopsy N/A 2016    Procedure: COLONOSCOPY, POSSIBLE BIOPSY, POSSIBLE POLYPECTOMY 17376;  Surgeon: Fortino Maier MD;  Location: [facility]    Colonoscopy,remv lesn,snare N/A 2016    Procedure: COLONOSCOPY, POSSIBLE BIOPSY, POSSIBLE POLYPECTOMY 84635;  Surgeon: Fortino Maier MD;  Location: [facility]    Create eardrum opening,gen anesth  10/07/2021    Knee arthroscopy      Lap gastric bypass/hernando-en-y      Other surgical history      Rotator Cuff Repair left side    Other surgical history Right     meniscus      Family History:   Family History   Problem Relation Age of Onset    Other (COPD [Other]) Father     Heart Attack Father     Stroke Father     Breast Cancer Mother     Diabetes Mother      Social History:     reports that he quit smoking about 5 years ago. His smoking use included cigarettes. He has never used smokeless tobacco. He reports current alcohol use. He reports current drug use. Drug: Cannabis.     Allergies: No Known Allergies    Medications:    Current Facility-Administered Medications on File Prior to Encounter   Medication Dose Route Frequency Provider Last Rate Last Admin    [COMPLETED] metoclopramide (Reglan) tab 10 mg  10 mg Oral Once Pat Altman MD   10 mg at 03/08/24 0758    [COMPLETED] famotidine (Pepcid) tab 20 mg  20 mg Oral Once Pat Altman MD   20 mg at 03/08/24 0758    [COMPLETED] ceFAZolin (Ancef) 2 g in D5W 50 mL Duplex container  2 g Intravenous Once Pat Altman MD   3 g at 03/08/24 0915     Current Outpatient Medications on File Prior to Encounter   Medication Sig Dispense Refill    diclofenac 75 MG Oral Tab EC Take 1 tablet (75 mg total) by mouth 2 (two) times daily as needed. 60 tablet 1    losartan 100 MG Oral Tab Take 1 tablet (100 mg total) by mouth daily. 90 tablet 0    spironolactone 50 MG Oral Tab Take 1 tablet (50 mg total) by mouth daily. 90 tablet 0    atorvastatin 10 MG Oral Tab Take 1 tablet (10 mg total) by mouth daily. 90 tablet 0    amphetamine-dextroamphetamine (ADDERALL) 20 MG Oral Tab Take 1 tablet (20 mg total) by mouth 2 (two) times daily. 60 tablet 0    Dasatinib (SPRYCEL) 100 MG Oral Tab Take 1 tablet by mouth daily.  0    Labetalol HCl 200 MG Oral Tab Take 1 tablet (200 mg total) by mouth 2 (two) times daily. 60 tablet 3    Vitamin B-12 1000 MCG Oral Tab Take 1 tablet (1,000 mcg total) by mouth daily.      Ascorbic Acid (VITAMIN C) 1000 MG Oral Tab Take 1 tablet (1,000 mg total) by mouth daily.      amphetamine-dextroamphetamine (ADDERALL) 20 MG Oral Tab Take 1 tablet (20 mg total) by mouth 2 (two) times daily. 60 tablet 0    [START ON 6/13/2024] amphetamine-dextroamphetamine (ADDERALL) 20 MG Oral Tab Take 1 tablet (20 mg total) by mouth 2 (two) times daily.  60 tablet 0    [START ON 2024] amphetamine-dextroamphetamine (ADDERALL) 20 MG Oral Tab Take 1 tablet (20 mg total) by mouth 2 (two) times daily. 60 tablet 0    [] amphetamine-dextroamphetamine (ADDERALL) 20 MG Oral Tab Take 1 tablet (20 mg total) by mouth 2 (two) times daily. 60 tablet 0    [] amphetamine-dextroamphetamine (ADDERALL) 20 MG Oral Tab Take 1 tablet (20 mg total) by mouth 2 (two) times daily. 60 tablet 0    [] amphetamine-dextroamphetamine (ADDERALL) 20 MG Oral Tab Take 1 tablet (20 mg total) by mouth 2 (two) times daily. 60 tablet 0    Cholecalciferol (VITAMIN D3) 1.25 MG (06525 UT) Oral Cap Take 1 tablet by mouth once a week.         Review of Systems:   A comprehensive review of systems was completed.    Pertinent positives and negatives noted in the HPI.    Objective:   Physical Exam:    /62 (BP Location: Right arm)   Pulse 67   Temp 97.2 °F (36.2 °C) (Oral)   Resp 18   Ht 5' 7\" (1.702 m)   Wt 260 lb (117.9 kg)   SpO2 95%   BMI 40.72 kg/m²   General: No acute distress, Alert  Respiratory: No rhonchi, no wheezes  Cardiovascular: S1, S2. Regular rate and rhythm  Abdomen: Soft, midly-tender, non-distended, positive bowel sounds  Neuro: No new focal deficits  Extremities: No edema      Results:    Labs:      Labs Last 24 Hours:    Recent Labs   Lab 24  0743   RBC 3.64*   HGB 11.3*   HCT 33.5*   MCV 92.0   MCH 31.0   MCHC 33.7   RDW 14.1   NEPRELIM 10.06*   WBC 14.1*   .0       Recent Labs   Lab 24  0743   *   BUN 24*   CREATSERUM 1.14   EGFRCR 76   CA 9.3   ALB 3.6      K 4.4      CO2 24.0   ALKPHO 97   AST 13*   ALT 25   BILT 0.4   TP 7.7       Lab Results   Component Value Date    INR 1.02 2022       No results for input(s): \"TROP\", \"TROPHS\", \"CK\" in the last 168 hours.    No results for input(s): \"TROP\", \"PBNP\" in the last 168 hours.    No results for input(s): \"PCT\" in the last 168 hours.    Imaging: Imaging data  reviewed in Epic.    Assessment & Plan:      #Acute diverticulitis without abscess  -IV Zosyn, pain control, IV fluids  -gets colonoscopies every 2y, advised to fu w/ [facility] as outpatient     #Essential hypertension  -bp soft, sys in 100s, resume labetalol w/ holding parameters     #Hyperlipidemia    #CML  -primary oncologist at outside hospital   -PTA: Dasatinib    #History of gastric bypass surgery    #Morbid obesity  -BMI 40.72        Plan of care discussed with Er physician & patient    Leslee Madsen DO    Supplementary Documentation:     The 21st Century Cures Act makes medical notes like these available to patients in the interest of transparency. Please be advised this is a medical document. Medical documents are intended to carry relevant information, facts as evident, and the clinical opinion of the practitioner. The medical note is intended as peer to peer communication and may appear blunt or direct. It is written in medical language and may contain abbreviations or verbiage that are unfamiliar.

## 2024-05-18 LAB
ALBUMIN SERPL-MCNC: 3.2 G/DL (ref 3.4–5)
ALBUMIN/GLOB SERPL: 0.9 {RATIO} (ref 1–2)
ALP LIVER SERPL-CCNC: 73 U/L
ALT SERPL-CCNC: 19 U/L
ANION GAP SERPL CALC-SCNC: 4 MMOL/L (ref 0–18)
AST SERPL-CCNC: 7 U/L (ref 15–37)
BASOPHILS # BLD AUTO: 0.04 X10(3) UL (ref 0–0.2)
BASOPHILS NFR BLD AUTO: 0.4 %
BILIRUB SERPL-MCNC: 0.7 MG/DL (ref 0.1–2)
BUN BLD-MCNC: 12 MG/DL (ref 9–23)
CALCIUM BLD-MCNC: 8.6 MG/DL (ref 8.5–10.1)
CHLORIDE SERPL-SCNC: 107 MMOL/L (ref 98–112)
CO2 SERPL-SCNC: 25 MMOL/L (ref 21–32)
CREAT BLD-MCNC: 0.96 MG/DL
EGFRCR SERPLBLD CKD-EPI 2021: 93 ML/MIN/1.73M2 (ref 60–?)
EOSINOPHIL # BLD AUTO: 0.25 X10(3) UL (ref 0–0.7)
EOSINOPHIL NFR BLD AUTO: 2.7 %
ERYTHROCYTE [DISTWIDTH] IN BLOOD BY AUTOMATED COUNT: 14.1 %
GLOBULIN PLAS-MCNC: 3.6 G/DL (ref 2.8–4.4)
GLUCOSE BLD-MCNC: 93 MG/DL (ref 70–99)
GLUCOSE BLD-MCNC: 95 MG/DL (ref 70–99)
HCT VFR BLD AUTO: 29.7 %
HGB BLD-MCNC: 10 G/DL
IMM GRANULOCYTES # BLD AUTO: 0.04 X10(3) UL (ref 0–1)
IMM GRANULOCYTES NFR BLD: 0.4 %
LYMPHOCYTES # BLD AUTO: 0.95 X10(3) UL (ref 1–4)
LYMPHOCYTES NFR BLD AUTO: 10.3 %
MAGNESIUM SERPL-MCNC: 1.9 MG/DL (ref 1.6–2.6)
MCH RBC QN AUTO: 31 PG (ref 26–34)
MCHC RBC AUTO-ENTMCNC: 33.7 G/DL (ref 31–37)
MCV RBC AUTO: 92 FL
MONOCYTES # BLD AUTO: 1.09 X10(3) UL (ref 0.1–1)
MONOCYTES NFR BLD AUTO: 11.8 %
NEUTROPHILS # BLD AUTO: 6.87 X10 (3) UL (ref 1.5–7.7)
NEUTROPHILS # BLD AUTO: 6.87 X10(3) UL (ref 1.5–7.7)
NEUTROPHILS NFR BLD AUTO: 74.4 %
OSMOLALITY SERPL CALC.SUM OF ELEC: 282 MOSM/KG (ref 275–295)
PLATELET # BLD AUTO: 174 10(3)UL (ref 150–450)
POTASSIUM SERPL-SCNC: 3.9 MMOL/L (ref 3.5–5.1)
PROT SERPL-MCNC: 6.8 G/DL (ref 6.4–8.2)
RBC # BLD AUTO: 3.23 X10(6)UL
SODIUM SERPL-SCNC: 136 MMOL/L (ref 136–145)
WBC # BLD AUTO: 9.2 X10(3) UL (ref 4–11)

## 2024-05-18 PROCEDURE — 99232 SBSQ HOSP IP/OBS MODERATE 35: CPT | Performed by: INTERNAL MEDICINE

## 2024-05-18 NOTE — PLAN OF CARE
Assumed patient care at 0730.  Vital signs stable.  Patient alert and oriented x 4.  Patient states pain is improving and he he has an appetite.  Diet advanced to clear liquids per Dr. Delarosa and patient tolerating.     Problem: Patient/Family Goals  Goal: Patient/Family Long Term Goal  Description: Patient's Long Term Goal: DC home    Interventions:  - NPO.  - IVF  - monitor VS, labs, intake and output  - offer pain meds prn  - Telemetry  - will tolerate diet w/o abd pain  - See additional Care Plan goals for specific interventions  Outcome: Progressing  Goal: Patient/Family Short Term Goal  Description: Patient's Short Term Goal: VS stable    Interventions:   -  NPO.  - IVF  - monitor VS, labs, intake and output  - offer pain meds prn  - Telemetry  - will tolerate diet w/o abd pain    - See additional Care Plan goals for specific interventions  Outcome: Progressing     Problem: GASTROINTESTINAL - ADULT  Goal: Minimal or absence of nausea and vomiting  Description: INTERVENTIONS:  - Maintain adequate hydration with IV or PO as ordered and tolerated  - Nasogastric tube to low intermittent suction as ordered  - Evaluate effectiveness of ordered antiemetic medications  - Provide nonpharmacologic comfort measures as appropriate  - Advance diet as tolerated, if ordered  - Obtain nutritional consult as needed  - Evaluate fluid balance  Outcome: Progressing  Goal: Maintains or returns to baseline bowel function  Description: INTERVENTIONS:  - Assess bowel function  - Maintain adequate hydration with IV or PO as ordered and tolerated  - Evaluate effectiveness of GI medications  - Encourage mobilization and activity  - Obtain nutritional consult as needed  - Establish a toileting routine/schedule  - Consider collaborating with pharmacy to review patient's medication profile  Outcome: Progressing     Problem: METABOLIC/FLUID AND ELECTROLYTES - ADULT  Goal: Electrolytes maintained within normal limits  Description:  INTERVENTIONS:  - Monitor labs and rhythm and assess patient for signs and symptoms of electrolyte imbalances  - Administer electrolyte replacement as ordered  - Monitor response to electrolyte replacements, including rhythm and repeat lab results as appropriate  - Fluid restriction as ordered  - Instruct patient on fluid and nutrition restrictions as appropriate  Outcome: Progressing  Goal: Hemodynamic stability and optimal renal function maintained  Description: INTERVENTIONS:  - Monitor labs and assess for signs and symptoms of volume excess or deficit  - Monitor intake, output and patient weight  - Monitor urine specific gravity, serum osmolarity and serum sodium as indicated or ordered  - Monitor response to interventions for patient's volume status, including labs, urine output, blood pressure (other measures as available)  - Encourage oral intake as appropriate  - Instruct patient on fluid and nutrition restrictions as appropriate  Outcome: Progressing

## 2024-05-18 NOTE — PROGRESS NOTES
[facility]   part of [facility]     Hospitalist Progress Note     Valerio Felipe Patient Status:  Inpatient    1970 MRN [id number]   Location [facility] 0SW-A Attending Gisela Delarosa MD   Hosp Day # 1 PCP Ayse Luo DO     Chief Complaint: Left lower quadrant abdominal pain    Subjective:     Patient states pain is better than yesterday, still has 5 out of 10 left lower quadrant abdominal pain.  Has some nausea earlier, better after having some clear liquid diet according to patient.  No nausea vomiting.    Objective:    Review of Systems:   A comprehensive review of systems was completed; pertinent positive and negatives stated in subjective.    Vital signs:  Temp:  [98.1 °F (36.7 °C)-98.8 °F (37.1 °C)] 98.6 °F (37 °C)  Pulse:  [79-87] 84  Resp:  [16-20] 17  BP: (105-115)/(44-61) 105/51  SpO2:  [92 %-96 %] 96 %    Physical Exam:    /51 (BP Location: Right arm)   Pulse 84   Temp 98.6 °F (37 °C) (Oral)   Resp 17   Ht 5' 7\" (1.702 m)   Wt 259 lb 14.8 oz (117.9 kg)   SpO2 96%   BMI 40.71 kg/m²     General: No acute distress  Respiratory: No wheezes, no rhonchi  Cardiovascular: S1, S2, regular rate and rhythm  Abdomen: Soft, left lower quadrant, non-distended, positive bowel sounds  Neuro: No new focal deficits.   Extremities: No pedal edema  No calf tenderness    Diagnostic Data:    Labs:  Recent Labs   Lab 24  0743 24  1004   WBC 14.1* 9.2   HGB 11.3* 10.0*   MCV 92.0 92.0   .0 174.0       Recent Labs   Lab 24  0743 24  1004   * 95   BUN 24* 12   CREATSERUM 1.14 0.96   CA 9.3 8.6   ALB 3.6 3.2*    136   K 4.4 3.9    107   CO2 24.0 25.0   ALKPHO 97 73   AST 13* 7*   ALT 25 19   BILT 0.4 0.7   TP 7.7 6.8       Estimated Creatinine Clearance: 81.3 mL/min (based on SCr of 0.96 mg/dL).    No results for input(s): \"TROP\", \"TROPHS\", \"CK\" in the last 168 hours.    No results for input(s): \"PTP\", \"INR\" in the last 168 hours.                Microbiology    No results found for this visit on 05/17/24.      Imaging: Reviewed in Epic.    Medications:    piperacillin-tazobactam  3.375 g Intravenous Q8H    heparin  7,500 Units Subcutaneous 2 times per day    atorvastatin  10 mg Oral Daily    labetalol  200 mg Oral BID       Assessment & Plan:      #Acute diverticulitis without abscess  -Continue IV antibiotics with IV Zosyn  -pain control  - IV fluids  -Bowel rest, was n.p.o. and clear liquid diet was advanced today  -Patient follows up with GI Dr. Fortino Maier as outpatient and has had prior colonoscopy in 5 /2021 on chart review which showed diverticulosis at that time     #Essential hypertension  -Continue labetalol w/ holding parameters      #Hyperlipidemia  -Continue home statin     #CML  -primary oncologist at outside hospital   -PTA: Dasatinib     #History of gastric bypass surgery     #Morbid obesity  -BMI 40.72         Gisela Delarosa MD    Supplementary Documentation:     Quality:  DVT Mechanical Prophylaxis:   SCDs,    DVT Pharmacologic Prophylaxis   Medication    heparin (Porcine) 5000 UNIT/ML injection 7,500 Units                Code Status: Not on file  Jamil: No urinary catheter in place  Jamil Duration (in days):   Central line:    PAUL:     Discharge is dependent on: Clinical progress  At this point Mr. Felipe is expected to be discharge to: Home    The 21st Century Cures Act makes medical notes like these available to patients in the interest of transparency. Please be advised this is a medical document. Medical documents are intended to carry relevant information, facts as evident, and the clinical opinion of the practitioner. The medical note is intended as peer to peer communication and may appear blunt or direct. It is written in medical language and may contain abbreviations or verbiage that are unfamiliar.

## 2024-05-18 NOTE — PLAN OF CARE
NURSING ADMISSION NOTE      Patient admitted via Ambulance  Oriented to room.  Safety precautions initiated.  Bed in low position.  Call light in reach.  IV fluids infusion in progress.  Admission interview completed.  Hospitalist paged for admitting orders.

## 2024-05-18 NOTE — PLAN OF CARE
Received patient awake and alert x 4. On room air, clear lungs. IV fluids. NPO. Up ad hernandez. Dilaudid for pain w/ partial relief.     Problem: Patient/Family Goals  Goal: Patient/Family Long Term Goal  Description: Patient's Long Term Goal: DC home    Interventions:  - NPO.  - IVF  - monitor VS, labs, intake and output  - offer pain meds prn  - Telemetry  - will tolerate diet w/o abd pain  - See additional Care Plan goals for specific interventions  Outcome: Progressing  Goal: Patient/Family Short Term Goal  Description: Patient's Short Term Goal: VS stable    Interventions:   -  NPO.  - IVF  - monitor VS, labs, intake and output  - offer pain meds prn  - Telemetry  - will tolerate diet w/o abd pain    - See additional Care Plan goals for specific interventions  Outcome: Progressing     Problem: GASTROINTESTINAL - ADULT  Goal: Minimal or absence of nausea and vomiting  Description: INTERVENTIONS:  - Maintain adequate hydration with IV or PO as ordered and tolerated  - Nasogastric tube to low intermittent suction as ordered  - Evaluate effectiveness of ordered antiemetic medications  - Provide nonpharmacologic comfort measures as appropriate  - Advance diet as tolerated, if ordered  - Obtain nutritional consult as needed  - Evaluate fluid balance  Outcome: Progressing  Goal: Maintains or returns to baseline bowel function  Description: INTERVENTIONS:  - Assess bowel function  - Maintain adequate hydration with IV or PO as ordered and tolerated  - Evaluate effectiveness of GI medications  - Encourage mobilization and activity  - Obtain nutritional consult as needed  - Establish a toileting routine/schedule  - Consider collaborating with pharmacy to review patient's medication profile  Outcome: Progressing     Problem: METABOLIC/FLUID AND ELECTROLYTES - ADULT  Goal: Electrolytes maintained within normal limits  Description: INTERVENTIONS:  - Monitor labs and rhythm and assess patient for signs and symptoms of electrolyte  imbalances  - Administer electrolyte replacement as ordered  - Monitor response to electrolyte replacements, including rhythm and repeat lab results as appropriate  - Fluid restriction as ordered  - Instruct patient on fluid and nutrition restrictions as appropriate  Outcome: Progressing  Goal: Hemodynamic stability and optimal renal function maintained  Description: INTERVENTIONS:  - Monitor labs and assess for signs and symptoms of volume excess or deficit  - Monitor intake, output and patient weight  - Monitor urine specific gravity, serum osmolarity and serum sodium as indicated or ordered  - Monitor response to interventions for patient's volume status, including labs, urine output, blood pressure (other measures as available)  - Encourage oral intake as appropriate  - Instruct patient on fluid and nutrition restrictions as appropriate  Outcome: Progressing

## 2024-05-18 NOTE — PLAN OF CARE
Patient transferred from overflow. VSS. Pain 4/10, declines pain medication. POC discussed, all questions and concerns addressed. All safety measures in place. Call light within reach.

## 2024-05-19 PROCEDURE — 99232 SBSQ HOSP IP/OBS MODERATE 35: CPT | Performed by: INTERNAL MEDICINE

## 2024-05-19 NOTE — PROGRESS NOTES
[facility]   part of [facility]     Hospitalist Progress Note     Valerio Felipe Patient Status:  Inpatient    1970 MRN [id number]   Location [facility] 0SW-A Attending Gisela Delarosa MD   Hosp Day # 2 PCP Ayse Luo DO     Chief Complaint: Left lower quadrant abdominal pain    Subjective:     Patient states pain is better than yesterday, still has 3 out of 10 left lower quadrant abdominal pain.  Tolerated clear liquid diet, eager to advance diet, diet advanced to full liquid this morning and would prefer to take soft diet later today according to patient.  No nausea vomiting.    Objective:    Review of Systems:   A comprehensive review of systems was completed; pertinent positive and negatives stated in subjective.    Vital signs:  Temp:  [98 °F (36.7 °C)-98.4 °F (36.9 °C)] 98.2 °F (36.8 °C)  Pulse:  [77-97] 78  Resp:  [16-20] 20  BP: (128-157)/(66-81) 144/72  SpO2:  [97 %-100 %] 97 %    Physical Exam:    /72 (BP Location: Right arm)   Pulse 78   Temp 98.2 °F (36.8 °C) (Oral)   Resp 20   Ht 5' 7\" (1.702 m)   Wt 259 lb 14.8 oz (117.9 kg)   SpO2 97%   BMI 40.71 kg/m²     General: No acute distress  Respiratory: No wheezes, no rhonchi  Cardiovascular: S1, S2, regular rate and rhythm  Abdomen: Soft, mild tenderness left lower quadrant, non-distended, positive bowel sounds  Neuro: No new focal deficits.   Extremities: No pedal edema  No calf tenderness    Diagnostic Data:    Labs:  Recent Labs   Lab 24  0743 24  1004   WBC 14.1* 9.2   HGB 11.3* 10.0*   MCV 92.0 92.0   .0 174.0       Recent Labs   Lab 24  0743 24  1004   * 95   BUN 24* 12   CREATSERUM 1.14 0.96   CA 9.3 8.6   ALB 3.6 3.2*    136   K 4.4 3.9    107   CO2 24.0 25.0   ALKPHO 97 73   AST 13* 7*   ALT 25 19   BILT 0.4 0.7   TP 7.7 6.8       Estimated Creatinine Clearance: 81.3 mL/min (based on SCr of 0.96 mg/dL).    No results for input(s): \"TROP\", \"TROPHS\", \"CK\" in the  last 168 hours.    No results for input(s): \"PTP\", \"INR\" in the last 168 hours.               Microbiology    No results found for this visit on 05/17/24.      Imaging: Reviewed in Epic.    Medications:    piperacillin-tazobactam  3.375 g Intravenous Q8H    heparin  7,500 Units Subcutaneous 2 times per day    atorvastatin  10 mg Oral Daily    labetalol  200 mg Oral BID       Assessment & Plan:      #Acute diverticulitis without abscess  -Continue IV antibiotics with IV Zosyn  -pain control  - IV fluids  -Tolerated clear liquid diet , diet advanced to full liquid diet this morning, plan to advance to soft diet this evening if no worsening pain or nausea vomiting  -Patient follows up with GI Dr. Fortino Maier as outpatient and has had prior colonoscopy in 5 /2021 on chart review which showed diverticulosis at that time     #Essential hypertension  -Continue labetalol w/ holding parameters      #Hyperlipidemia  -Continue home statin     #CML  -primary oncologist at outside hospital   -PTA: Dasatinib     #History of gastric bypass surgery     #Morbid obesity  -BMI 40.72         Gisela Delarosa MD    Supplementary Documentation:     Quality:  DVT Mechanical Prophylaxis:   SCDs,    DVT Pharmacologic Prophylaxis   Medication    heparin (Porcine) 5000 UNIT/ML injection 7,500 Units                Code Status: Not on file  Jamil: No urinary catheter in place  Jamil Duration (in days):   Central line:    PAUL: 5/20/2024    Discharge is dependent on: Clinical progress  At this point Mr. Felipe is expected to be discharge to: Home    The 21st Century Cures Act makes medical notes like these available to patients in the interest of transparency. Please be advised this is a medical document. Medical documents are intended to carry relevant information, facts as evident, and the clinical opinion of the practitioner. The medical note is intended as peer to peer communication and may appear blunt or direct. It is written in medical  language and may contain abbreviations or verbiage that are unfamiliar.

## 2024-05-19 NOTE — PLAN OF CARE
Received pt at 1930. Pt is A&O x 4; follows commands, states he has abd pain but does not want any medicine at this time. Pt is on RA, VSS, clear liquid diet. Pt is continent of bowel and bladder. Pt ambulates independently. IV access is patent infusing 0.9 NS @ 100 ml/hr and IV Zosyn. Shift assessment performed, HS meds given. NOC safety plan in place; bed in low position, call light and personal items within reach, pt encouraged to call staff for assistance.

## 2024-05-19 NOTE — PLAN OF CARE
BS active, passing flatus, abd pain to left lower quadrant 3/10.  Pt. Declines pain meds.  No n/v.  Pt. hoping to advance diet today.  On clear liquid diet.  NSR on tele, SAO2 100% on RA.  Ivf and abx infusing without problems.  Cbir.

## 2024-05-20 VITALS
RESPIRATION RATE: 14 BRPM | OXYGEN SATURATION: 97 % | SYSTOLIC BLOOD PRESSURE: 139 MMHG | DIASTOLIC BLOOD PRESSURE: 80 MMHG | BODY MASS INDEX: 40.8 KG/M2 | TEMPERATURE: 98 F | WEIGHT: 259.94 LBS | HEIGHT: 67 IN | HEART RATE: 70 BPM

## 2024-05-20 PROCEDURE — 99239 HOSP IP/OBS DSCHRG MGMT >30: CPT | Performed by: INTERNAL MEDICINE

## 2024-05-20 RX ORDER — AMOXICILLIN AND CLAVULANATE POTASSIUM 875; 125 MG/1; MG/1
1 TABLET, FILM COATED ORAL 2 TIMES DAILY
Qty: 20 TABLET | Refills: 0 | Status: SHIPPED | OUTPATIENT
Start: 2024-05-20 | End: 2024-05-30

## 2024-05-20 NOTE — PROGRESS NOTES
[facility]   part of [facility]     Hospitalist Progress Note     Valerio Felipe Patient Status:  Inpatient    1970 MRN [id number]   Location [facility] 0SW-A Attending Gisela Delarosa MD   Hosp Day # 3 PCP Ayse Luo DO     Chief Complaint: Left lower quadrant abdominal pain    Subjective:     Feeling better.  Tolerated soft diet.  Left lower quadrant abdominal pain 1 out of 10 only with palpation.  He could go home today.  No nausea vomiting.  Had bowel movement.    Objective:    Review of Systems:   A comprehensive review of systems was completed; pertinent positive and negatives stated in subjective.    Vital signs:  Temp:  [98 °F (36.7 °C)-98.7 °F (37.1 °C)] 98.4 °F (36.9 °C)  Pulse:  [70-84] 70  Resp:  [14-20] 14  BP: ()/(41-80) 139/80  SpO2:  [95 %-100 %] 97 %    Physical Exam:    /80 (BP Location: Right arm)   Pulse 70   Temp 98.4 °F (36.9 °C) (Oral)   Resp 14   Ht 5' 7\" (1.702 m)   Wt 259 lb 14.8 oz (117.9 kg)   SpO2 97%   BMI 40.71 kg/m²     General: No acute distress  Respiratory: No wheezes, no rhonchi  Cardiovascular: S1, S2, regular rate and rhythm  Abdomen: Soft, mild tenderness left lower quadrant, non-distended, positive bowel sounds  Neuro: No new focal deficits.   Extremities: No pedal edema  No calf tenderness    Diagnostic Data:    Labs:  Recent Labs   Lab 24  0743 24  1004   WBC 14.1* 9.2   HGB 11.3* 10.0*   MCV 92.0 92.0   .0 174.0       Recent Labs   Lab 24  0743 24  1004   * 95   BUN 24* 12   CREATSERUM 1.14 0.96   CA 9.3 8.6   ALB 3.6 3.2*    136   K 4.4 3.9    107   CO2 24.0 25.0   ALKPHO 97 73   AST 13* 7*   ALT 25 19   BILT 0.4 0.7   TP 7.7 6.8       Estimated Creatinine Clearance: 81.3 mL/min (based on SCr of 0.96 mg/dL).    No results for input(s): \"TROP\", \"TROPHS\", \"CK\" in the last 168 hours.    No results for input(s): \"PTP\", \"INR\" in the last 168 hours.               Microbiology    No  results found for this visit on 05/17/24.      Imaging: Reviewed in Epic.    Medications:    piperacillin-tazobactam  3.375 g Intravenous Q8H    heparin  7,500 Units Subcutaneous 2 times per day    atorvastatin  10 mg Oral Daily    labetalol  200 mg Oral BID       Assessment & Plan:      #Acute diverticulitis without abscess  -Continue IV antibiotics with IV Zosyn  -pain control  - IV fluids  -Tolerated clear liquid diet , diet advanced to full liquid diet this morning, plan to advance to soft diet this evening if no worsening pain or nausea vomiting  -Patient follows up with GI Dr. Fortino Maier as outpatient and has had prior colonoscopy in 5 /2021 on chart review which showed diverticulosis at that time     #Essential hypertension  -Continue labetalol w/ holding parameters      #Hyperlipidemia  -Continue home statin     #CML  -primary oncologist at outside hospital   -PTA: Dasatinib     #History of gastric bypass surgery     #Morbid obesity  -BMI 40.72     Will plan discharge home on oral antibiotics to finish full course of any symptoms.  Follow-up with regular outpatient primary care physician within 1 week in office.  Follow-up with regular outpatient gastroenterologist as previously directed for the screening colonoscopy.      Gisela Delarosa MD    Supplementary Documentation:     Quality:  DVT Mechanical Prophylaxis:   SCDs,    DVT Pharmacologic Prophylaxis   Medication    heparin (Porcine) 5000 UNIT/ML injection 7,500 Units                Code Status: Not on file  Jamil: No urinary catheter in place  Jamil Duration (in days):   Central line:    PAUL: 5/20/2024    Discharge is dependent on: Clinical progress  At this point Mr. Felipe is expected to be discharge to: Home    The 21st Century Cures Act makes medical notes like these available to patients in the interest of transparency. Please be advised this is a medical document. Medical documents are intended to carry relevant information, facts as evident,  and the clinical opinion of the practitioner. The medical note is intended as peer to peer communication and may appear blunt or direct. It is written in medical language and may contain abbreviations or verbiage that are unfamiliar.

## 2024-05-20 NOTE — PLAN OF CARE
Pt vitals stable, A&O x4. Pt denied chest pain and shortness of breath. IVF infusing. Tolerating diet. Continuous pulse ox and telemetry in place. Bed locked in low position, call light in reach, rounding provided.

## 2024-05-20 NOTE — DISCHARGE SUMMARY
[facility]  Discharge Summary    Valerio Felipe Patient Status:  Inpatient    1970 MRN [id number]   Location [facility] 3NW-A Attending No att. providers found   Hosp Day # 3 PCP Ayse Luo DO     Date of Admission: 2024    Date of Discharge: 2024     Disposition: Home or Self Care    Discharge Diagnosis:   #Acute diverticulitis without abscess    #Essential hypertension    #Hyperlipidemia    #CML    #History of gastric bypass surgery     #Morbid obesity  -BMI 40.72       Chief Complaint:   Chief Complaint   Patient presents with    Abdomen/Flank Pain       History of Present Illness:   Valerio Felipe is a 53 year old male with PMHx HTN, HLD, CML, hx of gastric bypass presents to ER w/ complains of abd pain.  Pain started 2-3 days ago. Denies n/v/diarrhea/constipation. No cp or sob. Pt was hoping it would get better but pain kept coming back w/ food. Pt no longer can really tolerating fluids or food now.   Please refer to history and physical done by Dr. Madsen for details on admission       Brief Synopsis:   CT abdomen pelvis done on 2024 showed acute diverticulitis involving distal descending colon without abscess.  Stable right lower lobe pulmonary nodule consistent with granulomatous disease.  Treated conservatively with bowel rest with n.p.o., IV fluids, IV antibiotics, IV Zofran as needed nausea vomiting, IV pain medications as needed.  Patient improved clinically.  Clear liquid advance and tolerated.  Diet advance slowly to full liquid diet and then soft diet which patient tolerated. Patient follows up with GI Dr. Fortino Maier as outpatient and has had prior colonoscopy in  on chart review which showed diverticulosis at that time, follow-up advised regular outpatient gastroenterologist as outpatient as previously directed for the screening colonoscopy, patient verbalized understanding.   Discharged in stable condition.  Follow-up with regular outpatient primary  care physician Ayse Luo DO within 1 week in office.        TCM Diagnosis at discharge from Hospital: Other: See above; still recommend for TCM follow-up    Lace+ Score: 61  59-90 High Risk  29-58 Medium Risk  0-28   Low Risk.     TCM Follow-Up Recommendation:Valerio Tapiarone   LACE > 58: High Risk of readmission after discharge from the hospital.      PCP: Ayse Luo DO      Procedures during hospitalization:   None     Incidental or significant findings and recommendations (brief descriptions):  none    Lab/Test results pending at Discharge:   None      Discharge Medications:        Discharge Medications        START taking these medications        Instructions Prescription details   amoxicillin clavulanate 875-125 MG Tabs  Commonly known as: Augmentin  Notes to patient: Can take with food if needed.      Take 1 tablet by mouth 2 (two) times daily for 10 days.   Stop taking on: May 30, 2024  Quantity: 20 tablet  Refills: 0            CHANGE how you take these medications        Instructions Prescription details   amphetamine-dextroamphetamine 20 MG Tabs  Commonly known as: Adderall  What changed: Another medication with the same name was removed. Continue taking this medication, and follow the directions you see here.      Take 1 tablet (20 mg total) by mouth 2 (two) times daily.   Quantity: 60 tablet  Refills: 0     amphetamine-dextroamphetamine 20 MG Tabs  Commonly known as: Adderall  What changed: Another medication with the same name was removed. Continue taking this medication, and follow the directions you see here.      Take 1 tablet (20 mg total) by mouth 2 (two) times daily.   Stop taking on: June 12, 2024  Quantity: 60 tablet  Refills: 0     amphetamine-dextroamphetamine 20 MG Tabs  Commonly known as: Adderall  Start taking on: June 13, 2024  What changed: Another medication with the same name was removed. Continue taking this medication, and follow the directions you see here.      Take 1 tablet  (20 mg total) by mouth 2 (two) times daily.   Stop taking on: July 13, 2024  Quantity: 60 tablet  Refills: 0     amphetamine-dextroamphetamine 20 MG Tabs  Commonly known as: Adderall  Start taking on: July 14, 2024  What changed: Another medication with the same name was removed. Continue taking this medication, and follow the directions you see here.      Take 1 tablet (20 mg total) by mouth 2 (two) times daily.   Stop taking on: August 13, 2024  Quantity: 60 tablet  Refills: 0            CONTINUE taking these medications        Instructions Prescription details   atorvastatin 10 MG Tabs  Commonly known as: Lipitor      Take 1 tablet (10 mg total) by mouth daily.   Quantity: 90 tablet  Refills: 0     cyanocobalamin 1000 MCG Tabs  Commonly known as: Vitamin B12      Take 1 tablet (1,000 mcg total) by mouth daily.   Refills: 0     diclofenac 75 MG Tbec  Commonly known as: Voltaren      Take 1 tablet (75 mg total) by mouth 2 (two) times daily as needed.   Quantity: 60 tablet  Refills: 1     labetalol 200 MG Tabs  Commonly known as: Normodyne      Take 1 tablet (200 mg total) by mouth 2 (two) times daily.   Quantity: 60 tablet  Refills: 3     losartan 100 MG Tabs  Commonly known as: Cozaar      Take 1 tablet (100 mg total) by mouth daily.   Quantity: 90 tablet  Refills: 0     spironolactone 50 MG Tabs  Commonly known as: Aldactone      Take 1 tablet (50 mg total) by mouth daily.   Quantity: 90 tablet  Refills: 0     Sprycel 100 MG Tabs  Generic drug: Dasatinib      Take 1 tablet by mouth daily.   Refills: 0     vitamin C 1000 MG Tabs      Take 1 tablet (1,000 mg total) by mouth daily.   Refills: 0     Vitamin D3 1.25 MG (80562 UT) Caps      Take 1 tablet by mouth once a week.   Refills: 0               Where to Get Your Medications        These medications were sent to [id number] - [place], [place]  [address] AT [facility] [address], [phone], [phone]  [address], [facility] [address]       Phone: [phone]   amoxicillin clavulanate 875-125 MG Tabs          [place] prescription monitoring program data reviewed in epic before prescribing narcotics/controlled substances: Not applicable as no narcotics prescribed    Follow up Visits: Follow-up with Ayse Luo DO in 1 week    Ayse Luo DO  [address]  [facility] [address] [phone]    Schedule an appointment as soon as possible for a visit in 1 week(s)      Fortino Maier MD  [address] [facility] [address]  [facility] [address] [phone]    Follow up in 4 week(s)      Appointments for Next 30 Days 5/20/2024 - 6/19/2024      None              Physical Exam:  /80 (BP Location: Right arm)   Pulse 70   Temp 98.4 °F (36.9 °C) (Oral)   Resp 14   Ht 5' 7\" (1.702 m)   Wt 259 lb 14.8 oz (117.9 kg)   SpO2 97%   BMI 40.71 kg/m²     General: No acute distress  Respiratory: No wheezes, no rhonchi  Cardiovascular: S1, S2, regular rate and rhythm  Abdomen: Soft, mild tenderness left lower quadrant, non-distended, positive bowel sounds  Neuro: No new focal deficits.   Extremities: No pedal edema  No calf tenderness      Discharge Condition: stable    Patient Discharge Instructions: See electronic chart      More than 30 minutes on discharge    Gisela Delarosa MD  5/20/2024

## 2024-05-20 NOTE — DISCHARGE PLANNING
NURSING DISCHARGE NOTE    Discharged Home via Ambulatory.  Accompanied by Support staff  Belongings Taken by patient/family.    AVS reviewed; questions addressed. Patient discharged home @ 0060.

## 2024-05-20 NOTE — PAYOR COMM NOTE
ASKING FOR RECONSIDERATION INPT  PT REMAINS IN HOUSE  HAS BEEN NPO, CLEAR LIQ DIET TO BEGIN ON 5/18  PT HAS BEEN ON IV ABS    CONTINUED STAY REVIEW    Payor: [facility] CORE/NAVIGATE  Subscriber #:  [id number]  Authorization Number: [id number]    Admit date: 5/17/24  Admit time: 12:19 PM      MEDICATIONS ADMINISTERED IN LAST 1 DAY:  atorvastatin (Lipitor) tab 10 mg       Date Action Dose Route User    5/20/2024 0900 Given 10 mg Oral Lilia Garza RN          heparin (Porcine) 5000 UNIT/ML injection 7,500 Units       Date Action Dose Route User    5/20/2024 0901 Given 7,500 Units Subcutaneous (Right Upper Abdomen) Lilia Garza RN    5/19/2024 2114 Given 7,500 Units Subcutaneous (Right Lower Abdomen) Tereza Orozco RN          labetalol (Normodyne) tab 200 mg       Date Action Dose Route User    5/19/2024 2114 Given 200 mg Oral Tereza Orozco RN          piperacillin-tazobactam (Zosyn) 3.375 g in dextrose 5% 100 mL IVPB-ADDV       Date Action Dose Route User    5/20/2024 0450 New Bag 3.375 g Intravenous Tereza Orozco RN    5/19/2024 2114 New Bag 3.375 g Intravenous Tereza Orozco RN    5/19/2024 1347 New Bag 3.375 g Intravenous Katherine Finney RN          sodium chloride 0.9% infusion       Date Action Dose Route User    5/19/2024 1347 New Bag (none) Intravenous Katherine Finney RN            Vitals (last day)       Date/Time Temp Pulse Resp BP SpO2 Weight O2 Device O2 Flow Rate (L/min) Who    05/20/24 0822 98.4 °F (36.9 °C) 70 14 139/80 97 % -- None (Room air) -- TB    05/20/24 0440 -- 73 -- 98/48 -- -- -- -- RA    05/20/24 0422 98.7 °F (37.1 °C) 75 20 88/41 95 % -- None (Room air) -- GL    05/20/24 0013 98 °F (36.7 °C) 84 20 125/73 100 % -- None (Room air) -- GL    05/19/24 1956 98.2 °F (36.8 °C) 79 16 123/74 100 % -- None (Room air) 0 L/min GL    05/19/24 1709 98.1 °F (36.7 °C) 77 20 121/68 100 % -- None (Room air) 0 L/min JM    05/19/24 1252 98.2 °F (36.8 °C) 78 20 144/72 97 % -- None (Room air) 0 L/min  SC    05/19/24 0840 98.4 °F (36.9 °C) 77 20 157/81 100 % -- None (Room air) 0 L/min     05/19/24 0352 98.2 °F (36.8 °C) 78 18 128/66 98 % -- None (Room air) -- [place]    05/19/24 0100 98 °F (36.7 °C) 81 18 137/76 97 % -- None (Room air) -- [place]       5/18 HOSPITALST NOTE    Chief Complaint: Left lower quadrant abdominal pain        Subjective:  Patient states pain is better than yesterday, still has 5 out of 10 left lower quadrant abdominal pain.  Has some nausea earlier, better after having some clear liquid diet according to patient.  No nausea vomiting.           Objective:  Review of Systems:   A comprehensive review of systems was completed; pertinent positive and negatives stated in subjective.     Vital signs:  Temp:  [98.1 °F (36.7 °C)-98.8 °F (37.1 °C)] 98.6 °F (37 °C)  Pulse:  [79-87] 84  Resp:  [16-20] 17  BP: (105-115)/(44-61) 105/51  SpO2:  [92 %-96 %] 96 %     Physical Exam:    /51 (BP Location: Right arm)   Pulse 84   Temp 98.6 °F (37 °C) (Oral)   Resp 17   Ht 5' 7\" (1.702 m)   Wt 259 lb 14.8 oz (117.9 kg)   SpO2 96%   BMI 40.71 kg/m²      General: No acute distress  Respiratory: No wheezes, no rhonchi  Cardiovascular: S1, S2, regular rate and rhythm  Abdomen: Soft, left lower quadrant, non-distended, positive bowel sounds  Neuro: No new focal deficits.   Extremities: No pedal edema  No calf tenderness     Diagnostic Data:    Labs:       Recent Labs   Lab 05/17/24  0743 05/18/24  1004   WBC 14.1* 9.2   HGB 11.3* 10.0*   MCV 92.0 92.0   .0 174.0              Recent Labs   Lab 05/17/24  0743 05/18/24  1004   * 95   BUN 24* 12   CREATSERUM 1.14 0.96   CA 9.3 8.6   ALB 3.6 3.2*    136   K 4.4 3.9    107   CO2 24.0 25.0   ALKPHO 97 73   AST 13* 7*   ALT 25 19   BILT 0.4 0.7   TP 7.7 6.8       Medications:   Scheduled Medications    piperacillin-tazobactam  3.375 g Intravenous Q8H    heparin  7,500 Units Subcutaneous 2 times per day    atorvastatin  10 mg Oral Daily     labetalol  200 mg Oral BID                  Assessment & Plan:  #Acute diverticulitis without abscess  -Continue IV antibiotics with IV Zosyn  -pain control  - IV fluids  -Bowel rest, was n.p.o. and clear liquid diet was advanced today  -Patient follows up with GI Dr. Fortino Maier as outpatient and has had prior colonoscopy in 5 /2021 on chart review which showed diverticulosis at that time     #Essential hypertension  -Continue labetalol w/ holding parameters      #Hyperlipidemia  -Continue home statin    #CML  -primary oncologist at outside hospital   -PTA: Dasatinib     #History of gastric bypass surgery     #Morbid obesity  -BMI 40.72      5/19 HOSPITALIST NOTE  Chief Complaint: Left lower quadrant abdominal pain        Subjective:  Patient states pain is better than yesterday, still has 3 out of 10 left lower quadrant abdominal pain.  Tolerated clear liquid diet, eager to advance diet, diet advanced to full liquid this morning and would prefer to take soft diet later today according to patient.  No nausea vomiting.     Vital signs:  Temp:  [98 °F (36.7 °C)-98.4 °F (36.9 °C)] 98.2 °F (36.8 °C)  Pulse:  [77-97] 78  Resp:  [16-20] 20  BP: (128-157)/(66-81) 144/72  SpO2:  [97 %-100 %] 97 %    Physical Exam:    /72 (BP Location: Right arm)   Pulse 78   Temp 98.2 °F (36.8 °C) (Oral)   Resp 20   Ht 5' 7\" (1.702 m)   Wt 259 lb 14.8 oz (117.9 kg)   SpO2 97%   BMI 40.71 kg/m²      General: No acute distress  Respiratory: No wheezes, no rhonchi  Cardiovascular: S1, S2, regular rate and rhythm  Abdomen: Soft, mild tenderness left lower quadrant, non-distended, positive bowel sounds  Neuro: No new focal deficits.   Extremities: No pedal edema  No calf tenderness     Abdomen: Soft, mild tenderness left lower quadrant, non-distended, positive bowel sounds  Neuro: No new focal deficits.   Extremities: No pedal edema  No calf tenderness     Diagnostic Data:    Labs:       Recent Labs   Lab 05/17/24  0743  05/18/24  1004   WBC 14.1* 9.2   HGB 11.3* 10.0*   MCV 92.0 92.0   .0 174.0              Recent Labs   Lab 05/17/24  0743 05/18/24  1004   * 95   BUN 24* 12   CREATSERUM 1.14 0.96   CA 9.3 8.6   ALB 3.6 3.2*    136   K 4.4 3.9    107   CO2 24.0 25.0   ALKPHO 97 73   AST 13* 7*   ALT 25 19   BILT 0.4 0.7   TP 7.7 6.8         Estimated Creatinine Clearance: 81.3 mL/min (based on SCr of 0.96 mg/dL).          Assessment & Plan:  #Acute diverticulitis without abscess  -Continue IV antibiotics with IV Zosyn  -pain control  - IV fluids  -Tolerated clear liquid diet , diet advanced to full liquid diet this morning, plan to advance to soft diet this evening if no worsening pain or nausea vomiting  -Patient follows up with GI Dr. Fortino Maier as outpatient and has had prior colonoscopy in 5 /2021 on chart review which showed diverticulosis at that time     #Essential hypertension  -Continue labetalol w/ holding parameters      #Hyperlipidemia  -Continue home statin     #CML  -primary oncologist at outside hospital   -PTA: Dasatinib     #History of gastric bypass surgery     #Morbid obesity  -BMI 40.72      5/20 HOSPITALIST NOTE    Subjective:  Feeling better.  Tolerated soft diet.  Left lower quadrant abdominal pain 1 out of 10 only with palpation.  He could go home today.  No nausea vomiting.  Had bowel movement.           Objective:  Review of Systems:   A comprehensive review of systems was completed; pertinent positive and negatives stated in subjective.     Vital signs:  Temp:  [98 °F (36.7 °C)-98.7 °F (37.1 °C)] 98.4 °F (36.9 °C)  Pulse:  [70-84] 70  Resp:  [14-20] 14  BP: ()/(41-80) 139/80  SpO2:  [95 %-100 %] 97 %     Physical Exam:    /80 (BP Location: Right arm)   Pulse 70   Temp 98.4 °F (36.9 °C) (Oral)   Resp 14   Ht 5' 7\" (1.702 m)   Wt 259 lb 14.8 oz (117.9 kg)   SpO2 97%   BMI 40.71 kg/m²      General: No acute distress  Respiratory: No wheezes, no  rhonchi  Cardiovascular: S1, S2, regular rate and rhythm  Abdomen: Soft, mild tenderness left lower quadrant, non-distended, positive bowel sounds  Neuro: No new focal deficits.   Extremities: No pedal edema  No calf tenderness      Medications:   Scheduled Medications    piperacillin-tazobactam  3.375 g Intravenous Q8H    heparin  7,500 Units Subcutaneous 2 times per day    atorvastatin  10 mg Oral Daily    labetalol  200 mg Oral BID                  Assessment & Plan:  #Acute diverticulitis without abscess  -Continue IV antibiotics with IV Zosyn  -pain control  - IV fluids  -Tolerated clear liquid diet , diet advanced to full liquid diet this morning, plan to advance to soft diet this evening if no worsening pain or nausea vomiting  -Patient follows up with GI Dr. Fortino Maier as outpatient and has had prior colonoscopy in 5 /2021 on chart review which showed diverticulosis at that time     #Essential hypertension  -Continue labetalol w/ holding parameters      #Hyperlipidemia  -Continue home statin     #CML  -primary oncologist at outside hospital   -PTA: Dasatinib     #History of gastric bypass surgery     #Morbid obesity  -BMI 40.72     Will plan discharge home on oral antibiotics to finish full course of any symptoms.  Follow-up with regular outpatient primary care physician within 1 week in office.  Follow-up with regular outpatient gastroenterologist as previously directed for the screening colonoscopy.

## 2024-05-21 ENCOUNTER — PATIENT OUTREACH (OUTPATIENT)
Dept: CASE MANAGEMENT | Age: 54
End: 2024-05-21

## 2024-05-21 ENCOUNTER — TELEPHONE (OUTPATIENT)
Dept: FAMILY MEDICINE CLINIC | Facility: CLINIC | Age: 54
End: 2024-05-21

## 2024-05-21 DIAGNOSIS — Z02.9 ENCOUNTERS FOR UNSPECIFIED ADMINISTRATIVE PURPOSE: Primary | ICD-10-CM

## 2024-05-21 NOTE — TELEPHONE ENCOUNTER
Attempted to contact patient for TCM today however no answer.  Patient does not have an appointment scheduled at this time.  TCM appointment recommended by 5/27/24 as patient is a High risk for readmission.  Please advise.    BOOK BY DATE (last date for TCM): 6/3/24    Clinical staff:  Please follow-up with patient and try to get them to schedule as patient would greatly benefit from TCM appointment.  Thank you!

## 2024-05-21 NOTE — PROGRESS NOTES
LM for pt to call [facility] for TCM since discharge. [facility] phone number was provided for pt to call back.

## 2024-05-21 NOTE — PROGRESS NOTES
LM for pt to call [facility] for TCM since discharge. [facility] phone number was provided for pt to call back.    TE will be sent to PCP office to FU on TCM appt as pt is a high risk for readmission.

## 2024-05-22 NOTE — TELEPHONE ENCOUNTER
My-chart message sent to pt  Me   to Valerio DÍAZ      5/22/24  8:38 AM  [place],  Please call us at [phone] to schedule a hospital follow up appointment.  Carla ANDERSON    This [employer] message has not been read.

## 2024-05-23 ENCOUNTER — HOSPITAL ENCOUNTER (EMERGENCY)
Age: 54
Discharge: HOME | End: 2024-05-23
Attending: EMERGENCY MEDICINE

## 2024-05-23 ENCOUNTER — APPOINTMENT (OUTPATIENT)
Dept: GENERAL RADIOLOGY | Age: 54
End: 2024-05-23
Attending: EMERGENCY MEDICINE

## 2024-05-23 ENCOUNTER — APPOINTMENT (OUTPATIENT)
Dept: CT IMAGING | Age: 54
End: 2024-05-23
Attending: EMERGENCY MEDICINE

## 2024-05-23 ENCOUNTER — APPOINTMENT (OUTPATIENT)
Dept: ULTRASOUND IMAGING | Age: 54
End: 2024-05-23
Attending: EMERGENCY MEDICINE

## 2024-05-23 ENCOUNTER — TELEPHONE (OUTPATIENT)
Dept: FAMILY MEDICINE CLINIC | Facility: CLINIC | Age: 54
End: 2024-05-23

## 2024-05-23 VITALS
HEIGHT: 67 IN | WEIGHT: 260 LBS | TEMPERATURE: 99 F | SYSTOLIC BLOOD PRESSURE: 122 MMHG | RESPIRATION RATE: 16 BRPM | OXYGEN SATURATION: 97 % | HEART RATE: 80 BPM | BODY MASS INDEX: 40.81 KG/M2 | DIASTOLIC BLOOD PRESSURE: 70 MMHG

## 2024-05-23 DIAGNOSIS — R06.02 SHORTNESS OF BREATH: Primary | ICD-10-CM

## 2024-05-23 DIAGNOSIS — M79.661 RIGHT CALF PAIN: ICD-10-CM

## 2024-05-23 LAB
ALBUMIN SERPL-MCNC: 3.4 G/DL (ref 3.4–5)
ALBUMIN/GLOB SERPL: 0.9 {RATIO} (ref 1–2)
ALP LIVER SERPL-CCNC: 73 U/L
ALT SERPL-CCNC: 86 U/L
ANION GAP SERPL CALC-SCNC: 6 MMOL/L (ref 0–18)
AST SERPL-CCNC: 58 U/L (ref 15–37)
BASOPHILS # BLD AUTO: 0.07 X10(3) UL (ref 0–0.2)
BASOPHILS NFR BLD AUTO: 1 %
BILIRUB SERPL-MCNC: 0.3 MG/DL (ref 0.1–2)
BUN BLD-MCNC: 20 MG/DL (ref 9–23)
CALCIUM BLD-MCNC: 9.5 MG/DL (ref 8.5–10.1)
CHLORIDE SERPL-SCNC: 108 MMOL/L (ref 98–112)
CO2 SERPL-SCNC: 24 MMOL/L (ref 21–32)
CREAT BLD-MCNC: 1.33 MG/DL
D DIMER PPP FEU-MCNC: 0.87 UG/ML FEU (ref ?–0.55)
EGFRCR SERPLBLD CKD-EPI 2021: 63 ML/MIN/1.73M2 (ref 60–?)
EOSINOPHIL # BLD AUTO: 0.27 X10(3) UL (ref 0–0.7)
EOSINOPHIL NFR BLD AUTO: 4 %
ERYTHROCYTE [DISTWIDTH] IN BLOOD BY AUTOMATED COUNT: 13.8 %
GLOBULIN PLAS-MCNC: 3.9 G/DL (ref 2.8–4.4)
GLUCOSE BLD-MCNC: 101 MG/DL (ref 70–99)
HCT VFR BLD AUTO: 32.5 %
HGB BLD-MCNC: 11.2 G/DL
IMM GRANULOCYTES # BLD AUTO: 0.03 X10(3) UL (ref 0–1)
IMM GRANULOCYTES NFR BLD: 0.4 %
LYMPHOCYTES # BLD AUTO: 1.76 X10(3) UL (ref 1–4)
LYMPHOCYTES NFR BLD AUTO: 26.3 %
MCH RBC QN AUTO: 31.6 PG (ref 26–34)
MCHC RBC AUTO-ENTMCNC: 34.5 G/DL (ref 31–37)
MCV RBC AUTO: 91.8 FL
MONOCYTES # BLD AUTO: 1.18 X10(3) UL (ref 0.1–1)
MONOCYTES NFR BLD AUTO: 17.6 %
NEUTROPHILS # BLD AUTO: 3.38 X10 (3) UL (ref 1.5–7.7)
NEUTROPHILS # BLD AUTO: 3.38 X10(3) UL (ref 1.5–7.7)
NEUTROPHILS NFR BLD AUTO: 50.7 %
OSMOLALITY SERPL CALC.SUM OF ELEC: 289 MOSM/KG (ref 275–295)
PLATELET # BLD AUTO: 237 10(3)UL (ref 150–450)
POTASSIUM SERPL-SCNC: 3.6 MMOL/L (ref 3.5–5.1)
PROT SERPL-MCNC: 7.3 G/DL (ref 6.4–8.2)
RBC # BLD AUTO: 3.54 X10(6)UL
SODIUM SERPL-SCNC: 138 MMOL/L (ref 136–145)
WBC # BLD AUTO: 6.7 X10(3) UL (ref 4–11)

## 2024-05-23 PROCEDURE — 85025 COMPLETE CBC W/AUTO DIFF WBC: CPT | Performed by: EMERGENCY MEDICINE

## 2024-05-23 PROCEDURE — 93010 ELECTROCARDIOGRAM REPORT: CPT

## 2024-05-23 PROCEDURE — 71275 CT ANGIOGRAPHY CHEST: CPT | Performed by: EMERGENCY MEDICINE

## 2024-05-23 PROCEDURE — 99285 EMERGENCY DEPT VISIT HI MDM: CPT

## 2024-05-23 PROCEDURE — 85379 FIBRIN DEGRADATION QUANT: CPT | Performed by: EMERGENCY MEDICINE

## 2024-05-23 PROCEDURE — 80053 COMPREHEN METABOLIC PANEL: CPT | Performed by: EMERGENCY MEDICINE

## 2024-05-23 PROCEDURE — 71045 X-RAY EXAM CHEST 1 VIEW: CPT | Performed by: EMERGENCY MEDICINE

## 2024-05-23 PROCEDURE — 93971 EXTREMITY STUDY: CPT | Performed by: EMERGENCY MEDICINE

## 2024-05-23 PROCEDURE — 93005 ELECTROCARDIOGRAM TRACING: CPT

## 2024-05-23 PROCEDURE — 96360 HYDRATION IV INFUSION INIT: CPT

## 2024-05-23 NOTE — TELEPHONE ENCOUNTER
Patient return a phone call patient just got out of the hospital on 05/20/2024 and was told that he need to schedule a appointment with Dr. TAVARES patient stated that his right calf is hard and on fire.    Please call patient back [phone]     Please advise

## 2024-05-23 NOTE — TELEPHONE ENCOUNTER
Spoke to pt, states leg is in constant  pain and hard and warm to touch, swollen  Advised ER  Pt agrees and will proceed with family member    Pt has hospital f/u in July, do you want to move appt up?

## 2024-05-23 NOTE — ED INITIAL ASSESSMENT (HPI)
Pt to ed with c/o posterior right knee and calf pain with SOB since  2 am, pt called PCP and was sent to ed for r/o DVT

## 2024-05-23 NOTE — ED PROVIDER NOTES
Patient Seen in: [facility] In [place]      History     Chief Complaint   Patient presents with    Leg Pain     Stated Complaint: Pt recently in hospital for diverticulitis, patient now with right leg pain , s*    Subjective:   HPI    Patient presents with right calf pain.  The patient states he was admitted from Thursday to Monday for treatment for diverticulitis.  He is still on oral antibiotics but feels like he is overall improving.  He noticed Tuesday and Wednesday that he was having some pain in the right calf.  He thought maybe it was a cramp.  Seems to be more painful and consistent now.  He notified his PCP and they suggested he come to rule out DVT.  He states that he feels a little short of breath.  It is more like he gets out of breath easily.  He denies any chest pain.    Objective:   Past Medical History:    Essential hypertension    High blood pressure    High cholesterol    Leukemia (HCC)    Leukemia (HCC)    Personal history of antineoplastic chemotherapy    oral chemo ([facility])    Smoking              Past Surgical History:   Procedure Laterality Date    Colonoscopy  07/2016    diverticulosis, polyps (adenoma/hp). repeat 5 years    Colonoscopy N/A 05/17/2021    Procedure: COLONOSCOPY;  Surgeon: Fortino Maier MD;  Location:  ENDOSCOPY    Colonoscopy,biopsy N/A 07/11/2016    Procedure: COLONOSCOPY, POSSIBLE BIOPSY, POSSIBLE POLYPECTOMY 63283;  Surgeon: Fortino Maier MD;  Location: [facility]    Colonoscopy,remv lesn,snare N/A 07/11/2016    Procedure: COLONOSCOPY, POSSIBLE BIOPSY, POSSIBLE POLYPECTOMY 61548;  Surgeon: Fortino Maier MD;  Location: [facility]    Create eardrum opening,gen anesth  10/07/2021    Knee arthroscopy      Lap gastric bypass/hernando-en-y      Other surgical history  2005    Rotator Cuff Repair left side    Other surgical history Right     meniscus                Social History     Socioeconomic History    Marital status:     Tobacco Use    Smoking status: Former     Current packs/day: 0.00     Types: Cigarettes     Quit date: 8/10/2018     Years since quittin.7    Smokeless tobacco: Never   Vaping Use    Vaping status: Never Used   Substance and Sexual Activity    Alcohol use: Yes     Comment: once per month    Drug use: Yes     Types: Cannabis     Comment: medical marijuana card   Other Topics Concern    Caffeine Concern Yes     Comment: 1 cup a day     Exercise Yes     Comment: 7 x week      Social Determinants of Health     Food Insecurity: No Food Insecurity (2024)    Food Insecurity     Food Insecurity: Never true   Transportation Needs: No Transportation Needs (2024)    Transportation Needs     Lack of Transportation: No   Physical Activity: Inactive (2019)    [facility], [facility]    Exercise Vital Sign     Days of Exercise per Week: 0 days     Minutes of Exercise per Session: 0 min    Received [facility], [facility]    Social Connections   Housing Stability: Low Risk  (2024)    Housing Stability     Housing Instability: No              Review of Systems    Positive for stated complaint: Pt recently in hospital for diverticulitis, patient now with right leg pain , s*  Other systems are as noted in HPI.  Constitutional and vital signs reviewed.      All other systems reviewed and negative except as noted above.    Physical Exam     ED Triage Vitals [24 1636]   /75   Pulse 83   Resp 18   Temp 98.7 °F (37.1 °C)   Temp src Temporal   SpO2 95 %   O2 Device None (Room air)       Current Vitals:   Vital Signs  BP: 122/70  Pulse: 80  Resp: 16  Temp: 98.7 °F (37.1 °C)  Temp src: Temporal    Oxygen Therapy  SpO2: 97 %  O2 Device: None (Room air)            Physical Exam    General: Alert and oriented x3 in no acute distress.  Neck: Supple.  Cardiovascular: Regular rate and rhythm, no  murmurs.  Respiratory: Lungs clear to auscultation.  Abdomen: Soft, nontender, no rebound or guarding, normal active bowel sounds, no CVA tenderness.  Extremities: Tenderness in the right calf without appreciable edema, pedal pulses intact.  Skin: Warm and dry.    ED Course     Labs Reviewed   COMP METABOLIC PANEL (14) - Abnormal; Notable for the following components:       Result Value    Glucose 101 (*)     Creatinine 1.33 (*)     AST 58 (*)     ALT 86 (*)     A/G Ratio 0.9 (*)     All other components within normal limits   D-DIMER - Abnormal; Notable for the following components:    D-Dimer 0.87 (*)     All other components within normal limits   CBC W/ DIFFERENTIAL - Abnormal; Notable for the following components:    RBC 3.54 (*)     HGB 11.2 (*)     HCT 32.5 (*)     Monocyte Absolute 1.18 (*)     All other components within normal limits   CBC WITH DIFFERENTIAL WITH PLATELET    Narrative:     The following orders were created for panel order CBC With Differential With Platelet.  Procedure                               Abnormality         Status                     ---------                               -----------         ------                     CBC W/ DIFFERENTIAL[[id number]]          Abnormal            Final result                 Please view results for these tests on the individual orders.     EKG    Rate, intervals and axes as noted on EKG Report.  Rate: 75  Rhythm: Sinus Rhythm  Reading: No acute ischemic abnormality               CT ANGIOGRAPHY, CHEST (CPT=71275)    Result Date: 5/23/2024  PROCEDURE:  CT ANGIOGRAPHY, CHEST (CPT=71275)  COMPARISON:  [place], CT, CT ABDOMEN+PELVIS(CONTRAST ONLY)(CPT=74177), 7/18/2021, 9:55 AM.  [place], CT, CT ABDOMEN+PELVIS(CONTRAST ONLY)(CPT=74177), 5/17/2024, 8:49 AM.  [place], CT, CT ANGIOGRAPHY, CHEST (CPT=71275), 4/01/2016, 2:16 PM.  INDICATIONS:  Pt recently in hospital for diverticulitis, patient now with right leg pain , sent by PMD for r/o blood  clot.  TECHNIQUE:  IV contrast-enhanced multislice CT angiography is performed through the pulmonary arterial anatomy. 3D volume renderings are generated.  Dose reduction techniques were used. Dose information is transmitted to the ACR (American College of Radiology) NRDR (National Radiology Data Registry) which includes the Dose Index Registry.  PATIENT STATED HISTORY:(As transcribed by Technologist)  Patient recently in hospital for diverticulitis. patient here now with right leg pain to check for blood clots.   CONTRAST USED:  100cc of Isovue 370  FINDINGS:  VASCULATURE:  No visible pulmonary arterial thrombus or attenuation.  THORACIC AORTA:  No aneurysm or visible dissection.  LUNGS:  Right lower lobe 1.2 x 0.8 cm nodule (image 151).  Minimal ground-glass densities in lungs.  Left lower lobe peripheral 5 mm nodule (image 173 close peer. MEDIASTINUM:  No adenopathy or mass.  SRINIVASA:  No mass or adenopathy.  CARDIAC:  No enlargement, pericardial thickening, or significant coronary artery calcification. PLEURA:  No mass or effusion.  CHEST WALL:  No mass or axillary adenopathy.  LIMITED ABDOMEN:  Sequelae of gastric banding is noted. BONES:  Diffuse idiopathic skeletal hyperostosis. OTHER:  Negative.             CONCLUSION:   No evidence of pulmonary embolus.  Right lower lobe 1.2 cm nodule is stable.  5 mm nodule in the left lower lobe is likely stable as well.      LOCATION:  [place] [address]   Dictated by (CST): Jakub Moore MD on 5/23/2024 at 7:37 PM     Finalized by (CST): Jakub Moore MD on 5/23/2024 at 7:42 PM       I personally reviewed the chest films and no appreciable pulmonary edema or focal infiltrate noted.    XR CHEST AP PORTABLE  (CPT=71045)    Result Date: 5/23/2024            PROCEDURE:  XR CHEST AP PORTABLE  (CPT=71045)  TECHNIQUE:  AP chest radiograph was obtained.  COMPARISON:  [place], XR, XR CHEST PA + LAT CHEST (CPT=71046), 10/12/2023, 12:52 PM.  INDICATIONS:  amanda  PATIENT STATED  HISTORY: (As transcribed by Technologist)  Pt c/o difficulty breathing.    FINDINGS: Enlarged cardiac silhouette.  Pulmonary vasculature demonstrates minimal apically redistribution. No consolidation, pleural effusion or pneumothorax. IMPRESSION: No consolidation.   LOCATION:  [id number]      Dictated by (CST): Jakub Moore MD on 5/23/2024 at 6:05 PM     Finalized by (CST): Jakub Moore MD on 5/23/2024 at 6:06 PM       US VENOUS DOPPLER LEG RIGHT - DIAG IMG (CPT=93971)    Result Date: 5/23/2024  PROCEDURE:  US VENOUS DOPPLER LEG RIGHT - DIAG IMG (CPT=93971)  COMPARISON:  [place], US, US VENOUS DOPPLER LEG RIGHT - DIAG IMG (CPT=93971), 8/31/2021, 11:43 AM.  INDICATIONS:  Swelling, pain  TECHNIQUE:  Real time, grey scale, and duplex ultrasound was used to evaluate the lower extremity venous system. B-mode two-dimensional images of the vascular structures, Doppler spectral analysis, and color flow.  Doppler imaging were performed.  The following veins were imaged:  Common, deep, and superficial femoral, popliteal, sapheno-femoral junction, posterior tibial veins, and the contralateral common femoral vein.  PATIENT STATED HISTORY: (As transcribed by Technologist)  Patient presents with right leg swelling and pain.    FINDINGS:  EXTREMITY EXAMINED:  Right lateral SAPHENOFEMORAL JUNCTION:  No reflux. THROMBI:  None visible. COMPRESSION:  Normal compressibility, phasicity, and augmentation. OTHER:  Negative.            CONCLUSION:  No evidence of right lower extremity DVT.   LOCATION:  [facility]    Dictated by (CST): Hitesh Holm MD on 5/23/2024 at 5:42 PM     Finalized by (CST): Hitesh Holm MD on 5/23/2024 at 5:43 PM       CT ABDOMEN+PELVIS(CONTRAST ONLY)(CPT=74177)    Result Date: 5/17/2024  PROCEDURE:  CT ABDOMEN+PELVIS (CONTRAST ONLY) (CPT=74177)  COMPARISON:  [place] CT, CT ABDOMEN+PELVIS(CONTRAST ONLY)(CPT=74177), 7/18/2021, 9:55 AM.  [facility] , CT, CT ABDOMEN+PELVIS KIDNEYSTONE 2D RNDR(NO IV,NO  ORAL)(CPT=74176), 1/27/2021, 5:22 AM.  [facility] , CT, ABD(S) / PELVIS(S)-SET, 1/04/2005, 10:46 PM.  INDICATIONS:  LLQ pain for a couple days  TECHNIQUE:  CT scanning was performed from the dome of the diaphragm to the pubic symphysis with non-ionic intravenous contrast material. Post contrast coronal MPR imaging was performed.  Dose reduction techniques were used. Dose information is transmitted to the ACR (American College of Radiology) NRDR (National Radiology Data Registry) which includes the Dose Index Registry.  PATIENT STATED HISTORY:(As transcribed by Technologist)  Patient has had increasing LLQ pain for a few days.   CONTRAST USED:  100cc of Isovue 370  FINDINGS:  LIVER:  No enlargement, atrophy, abnormal density, or significant focal lesion.  BILIARY:  No visible dilatation or calcification.  PANCREAS:  No lesion, fluid collection, ductal dilatation, or atrophy.  SPLEEN:  No enlargement or focal lesion.  KIDNEYS:  No mass, obstruction, or calcification.  ADRENALS:  No mass or enlargement.  AORTA/VASCULAR:  No aneurysm or dissection.  RETROPERITONEUM:  No mass or adenopathy.  BOWEL/MESENTERY:  There is inflammatory changes surrounding the distal descending colon in the area of diverticula with focal wall thickening.  Findings consistent with acute diverticulitis.  There is no abscess or free air.  The appendix is unremarkable.  There is a lap band noted surrounding the GE junction. ABDOMINAL WALL:  No mass or hernia.  URINARY BLADDER:  No visible focal wall thickening, lesion, or calculus.  PELVIC NODES:  No adenopathy.  PELVIC ORGANS:  No visible mass.  Pelvic organs appropriate for patient age.  BONES:  No bony lesion or fracture.  Mild to moderate degenerative changes of the thoracic and lumbar spine. LUNG BASES:  There is a pulmonary nodule in the right lower lobe measuring 10 x 8 mm which is stable since 2021 and therefore consistent with benign granuloma. OTHER:  Negative.             CONCLUSION:   1.   Acute diverticulitis involving the distal descending colon without abscess.  2. Stable right lower lobe pulmonary nodule consistent with granulomatous disease.   LOCATION:  [facility]   Dictated by (CST): Severino Lopez MD on 5/17/2024 at 9:17 AM     Finalized by (CST): Severino Lopez MD on 5/17/2024 at 9:20 AM        Medications   sodium chloride 0.9 % IV bolus 1,000 mL (0 mL Intravenous Stopped 5/23/24 1928)   iopamidol 76% (ISOVUE-370) injection for power injector (100 mL Intravenous Given 5/23/24 1907)     The patient was given a bolus of normal saline prior to CT given his elevated creatinine.       MDM      Patient presents with calf pain and shortness of breath after recent hospitalization.  Differential diagnosis includes but is not limited to DVT, pulmonary embolism, pneumonia, dehydration and electrolyte abnormalities.  The patient does not have evidence of DVT or PE on imaging despite elevated D-dimer.  He does have an elevated creatinine which may suggest relative dehydration.  He has minorly elevated liver enzymes which is nonspecific.  The etiology of his symptoms is not entirely clear at this time.  He may just be deconditioned from being in the hospital.  He overall appears well.  He will follow-up with his primary in the office as planned or return if he has new or worsening symptoms.                           Medical Decision Making      Disposition and Plan     Clinical Impression:  1. Shortness of breath    2. Right calf pain         Disposition:  Discharge  5/23/2024  8:17 pm    Follow-up:  Ayse Luo DO  [address] [place] [id number]  [phone]    Call  As needed          Medications Prescribed:  Discharge Medication List as of 5/23/2024  8:23 PM

## 2024-05-24 ENCOUNTER — TELEPHONE (OUTPATIENT)
Dept: FAMILY MEDICINE CLINIC | Facility: CLINIC | Age: 54
End: 2024-05-24

## 2024-05-24 LAB
ATRIAL RATE: 75 BPM
P AXIS: 26 DEGREES
P-R INTERVAL: 162 MS
Q-T INTERVAL: 414 MS
QRS DURATION: 100 MS
QTC CALCULATION (BEZET): 462 MS
R AXIS: 26 DEGREES
T AXIS: 19 DEGREES
VENTRICULAR RATE: 75 BPM

## 2024-05-24 RX ORDER — MELOXICAM 15 MG/1
15 TABLET ORAL DAILY
Qty: 14 TABLET | Refills: 0 | Status: SHIPPED | OUTPATIENT
Start: 2024-05-24

## 2024-05-24 RX ORDER — CYCLOBENZAPRINE HCL 10 MG
10 TABLET ORAL 3 TIMES DAILY PRN
Qty: 30 TABLET | Refills: 1 | Status: SHIPPED | OUTPATIENT
Start: 2024-05-24 | End: 2024-06-13

## 2024-05-24 NOTE — TELEPHONE ENCOUNTER
Received page stating was supposed to get medications but never sent, however notes in chart imply that ok for appointment to discuss prescription medications.    Was hospitalized for diverticulitis, now for the last couple of days had pain in calf, and went to ER for ruling out blood clot.    States has pinched nerve and struggling to get out of bed. Was hoping for pain reliever or muscle relaxant.    Did not receive message that recommended appointment.    Sent in muscle relaxant

## 2024-05-26 ENCOUNTER — HOSPITAL ENCOUNTER (EMERGENCY)
Facility: HOSPITAL | Age: 54
Discharge: HOME | End: 2024-05-26
Attending: EMERGENCY MEDICINE

## 2024-05-26 VITALS
SYSTOLIC BLOOD PRESSURE: 121 MMHG | TEMPERATURE: 98 F | DIASTOLIC BLOOD PRESSURE: 74 MMHG | HEART RATE: 69 BPM | OXYGEN SATURATION: 100 % | RESPIRATION RATE: 18 BRPM

## 2024-05-26 DIAGNOSIS — M54.40 BACK PAIN OF LUMBAR REGION WITH SCIATICA: Primary | ICD-10-CM

## 2024-05-26 PROCEDURE — 99284 EMERGENCY DEPT VISIT MOD MDM: CPT

## 2024-05-26 PROCEDURE — 96375 TX/PRO/DX INJ NEW DRUG ADDON: CPT

## 2024-05-26 PROCEDURE — 96361 HYDRATE IV INFUSION ADD-ON: CPT

## 2024-05-26 PROCEDURE — 99285 EMERGENCY DEPT VISIT HI MDM: CPT

## 2024-05-26 PROCEDURE — 96374 THER/PROPH/DIAG INJ IV PUSH: CPT

## 2024-05-26 RX ORDER — PREDNISONE 20 MG/1
40 TABLET ORAL DAILY
Qty: 8 TABLET | Refills: 0 | Status: SHIPPED | OUTPATIENT
Start: 2024-05-27 | End: 2024-05-30

## 2024-05-26 RX ORDER — HYDROMORPHONE HYDROCHLORIDE 1 MG/ML
1 INJECTION, SOLUTION INTRAMUSCULAR; INTRAVENOUS; SUBCUTANEOUS ONCE
Status: COMPLETED | OUTPATIENT
Start: 2024-05-26 | End: 2024-05-26

## 2024-05-26 RX ORDER — HYDROCODONE BITARTRATE AND ACETAMINOPHEN 5; 325 MG/1; MG/1
1 TABLET ORAL EVERY 8 HOURS PRN
Qty: 12 TABLET | Refills: 0 | Status: SHIPPED | OUTPATIENT
Start: 2024-05-26 | End: 2024-05-30

## 2024-05-26 RX ORDER — KETOROLAC TROMETHAMINE 15 MG/ML
15 INJECTION, SOLUTION INTRAMUSCULAR; INTRAVENOUS ONCE
Status: COMPLETED | OUTPATIENT
Start: 2024-05-26 | End: 2024-05-26

## 2024-05-26 RX ORDER — MORPHINE SULFATE 4 MG/ML
4 INJECTION, SOLUTION INTRAMUSCULAR; INTRAVENOUS ONCE
Status: COMPLETED | OUTPATIENT
Start: 2024-05-26 | End: 2024-05-26

## 2024-05-26 RX ORDER — IBUPROFEN 600 MG/1
600 TABLET ORAL EVERY 8 HOURS PRN
Qty: 30 TABLET | Refills: 0 | Status: SHIPPED | OUTPATIENT
Start: 2024-05-26 | End: 2024-06-02

## 2024-05-26 RX ORDER — DIAZEPAM 2 MG/1
2 TABLET ORAL 3 TIMES DAILY PRN
Qty: 12 TABLET | Refills: 0 | Status: SHIPPED | OUTPATIENT
Start: 2024-05-26 | End: 2024-05-30

## 2024-05-26 RX ORDER — DIAZEPAM 5 MG/ML
5 INJECTION, SOLUTION INTRAMUSCULAR; INTRAVENOUS ONCE
Status: COMPLETED | OUTPATIENT
Start: 2024-05-26 | End: 2024-05-26

## 2024-05-26 RX ORDER — DEXAMETHASONE SODIUM PHOSPHATE 10 MG/ML
10 INJECTION, SOLUTION INTRAMUSCULAR; INTRAVENOUS ONCE
Status: COMPLETED | OUTPATIENT
Start: 2024-05-26 | End: 2024-05-26

## 2024-05-26 NOTE — ED QUICK NOTES
Rounded on patient, VSS. Patient ambulated to bathroom. Steady gait noted. Patient moving at brisk pace. States pain has improved significantly.

## 2024-05-26 NOTE — ED INITIAL ASSESSMENT (HPI)
Ambulatory to ED with c/o right leg cramping. Seen here on 5.23.24. Pain starts in his buttocks and radiates down to his ankle.

## 2024-05-26 NOTE — ED PROVIDER NOTES
Patient Seen in: [facility] Emergency Department      History     Chief Complaint   Patient presents with    Pain    Cramping     Stated Complaint: leg cramping, right leg. starts in lower back. started 1 week prior.    Subjective:   53-year-old male here for sciatica-like features.  Patient recently mated for diverticulitis.  Then after discharge developed some right calf pain.  Was seen here about 3 days ago for some shortness of breath and right calf pain.  Duplex ultrasound is negative for DVT.  The CT abdomen pelvis to assess his diverticulitis was present without any abscess.  He has no complaints regarding that today.  Also a CT of the chest to rule out PE and that was negative.  States since then the pain has returned not just in the calf but now down in the right gluteus region down the right leg to the level of the calf.  No falls.  No trauma.  No midline back pain.  No history of back surgeries.  Not diabetic.  No fevers.  No incontinence or retention.            Objective:   Past Medical History:    Essential hypertension    High blood pressure    High cholesterol    Leukemia (HCC)    Leukemia (HCC)    Personal history of antineoplastic chemotherapy    oral chemo ([facility])    Smoking              Past Surgical History:   Procedure Laterality Date    Colonoscopy  07/2016    diverticulosis, polyps (adenoma/hp). repeat 5 years    Colonoscopy N/A 05/17/2021    Procedure: COLONOSCOPY;  Surgeon: Fortino Maier MD;  Location:  ENDOSCOPY    Colonoscopy,biopsy N/A 07/11/2016    Procedure: COLONOSCOPY, POSSIBLE BIOPSY, POSSIBLE POLYPECTOMY 73197;  Surgeon: Fortino Maier MD;  Location: [facility]    Colonoscopy,remv lesn,snare N/A 07/11/2016    Procedure: COLONOSCOPY, POSSIBLE BIOPSY, POSSIBLE POLYPECTOMY 71958;  Surgeon: Fortino Maier MD;  Location: [facility]    Create eardrum opening,gen anesth  10/07/2021    Knee arthroscopy      Lap gastric bypass/hernando-en-y      Other surgical  history  2005    Rotator Cuff Repair left side    Other surgical history Right     meniscus                Social History     Socioeconomic History    Marital status:    Tobacco Use    Smoking status: Former     Current packs/day: 0.00     Types: Cigarettes     Quit date: 8/10/2018     Years since quittin.7    Smokeless tobacco: Never   Vaping Use    Vaping status: Never Used   Substance and Sexual Activity    Alcohol use: Yes     Comment: once per month    Drug use: Yes     Types: Cannabis     Comment: medical marijuana card   Other Topics Concern    Caffeine Concern Yes     Comment: 1 cup a day     Exercise Yes     Comment: 7 x week      Social Determinants of Health     Food Insecurity: No Food Insecurity (2024)    Food Insecurity     Food Insecurity: Never true   Transportation Needs: No Transportation Needs (2024)    Transportation Needs     Lack of Transportation: No   Physical Activity: Inactive (2019)    [facility], [facility]    Exercise Vital Sign     Days of Exercise per Week: 0 days     Minutes of Exercise per Session: 0 min    [facility], [facility]    Social Connections   Housing Stability: Low Risk  (2024)    Housing Stability     Housing Instability: No              Review of Systems   Constitutional:  Negative for fever.   Respiratory:  Negative for shortness of breath.    Cardiovascular:  Negative for chest pain.   Gastrointestinal:  Negative for abdominal pain.   Genitourinary:  Negative for difficulty urinating and flank pain.   Musculoskeletal:  Positive for back pain.   Neurological:  Negative for numbness.   Hematological:  Does not bruise/bleed easily.       Positive for stated complaint: leg cramping, right leg. starts in lower back. started 1 week prior.  Other systems are as noted in HPI.  Constitutional and vital signs reviewed.      All other systems  reviewed and negative except as noted above.    Physical Exam     ED Triage Vitals [05/26/24 1049]   /77   Pulse 71   Resp 16   Temp 98.2 °F (36.8 °C)   Temp src Temporal   SpO2 100 %   O2 Device None (Room air)       Current Vitals:   Vital Signs  BP: 121/74  Pulse: 69  Resp: 18  Temp: 98.2 °F (36.8 °C)  Temp src: Temporal    Oxygen Therapy  SpO2: 100 %  O2 Device: None (Room air)            Physical Exam  Vitals and nursing note reviewed.   HENT:      Head: Normocephalic.   Cardiovascular:      Rate and Rhythm: Normal rate.      Pulses: Normal pulses.      Heart sounds: Normal heart sounds.   Pulmonary:      Effort: Pulmonary effort is normal. No respiratory distress.      Breath sounds: Normal breath sounds.   Abdominal:      General: There is no distension.      Palpations: Abdomen is soft.      Tenderness: There is no abdominal tenderness. There is no right CVA tenderness, left CVA tenderness, guarding or rebound.   Musculoskeletal:         General: Normal range of motion.      Cervical back: Neck supple.   Skin:     General: Skin is warm and dry.      Findings: No erythema or rash.   Neurological:      General: No focal deficit present.      Mental Status: He is alert and oriented to person, place, and time.      Sensory: No sensory deficit.      Motor: No weakness.      Coordination: Coordination normal.      Gait: Gait normal.      Deep Tendon Reflexes: Reflexes normal.         Reproducible tenderness in the right piriformis which causes radicular symptoms down the right leg.  He has no palpable pain to the lower extremities more radiating from the right glute consistent with sciatica.  2+ DTRs.  Straight leg positive on the right at about 30 degrees.  No rashes skin changes bruising crepitus erythema or rash or cellulitis.  No saddle anesthesia.  Neurovascular intact    ED Course   Labs Reviewed - No data to display                   MDM      CT ANGIOGRAPHY, CHEST (CPT=71275)    Result Date:  5/23/2024  CONCLUSION:   No evidence of pulmonary embolus.  Right lower lobe 1.2 cm nodule is stable.  5 mm nodule in the left lower lobe is likely stable as well.      LOCATION:  [id number]   Dictated by (CST): Jakub Moore MD on 5/23/2024 at 7:37 PM     Finalized by (CST): Jakub Moore MD on 5/23/2024 at 7:42 PM       XR CHEST AP PORTABLE  (CPT=71045)    Result Date: 5/23/2024  PROCEDURE:  XR CHEST AP PORTABLE  (CPT=71045)  TECHNIQUE:  AP chest radiograph was obtained.  COMPARISON:  [facility], XR, XR CHEST PA + LAT CHEST (CPT=71046), 10/12/2023, 12:52 PM.  INDICATIONS:  amanda  PATIENT STATED HISTORY: (As transcribed by Technologist)  Pt c/o difficulty breathing.    FINDINGS: Enlarged cardiac silhouette.  Pulmonary vasculature demonstrates minimal apically redistribution. No consolidation, pleural effusion or pneumothorax. IMPRESSION: No consolidation.   LOCATION:  [id number]      Dictated by (CST): Jakub Moore MD on 5/23/2024 at 6:05 PM     Finalized by (CST): Jakub Moore MD on 5/23/2024 at 6:06 PM       US VENOUS DOPPLER LEG RIGHT - DIAG IMG (CPT=93971)    Result Date: 5/23/2024  CONCLUSION:  No evidence of right lower extremity DVT.   LOCATION:  [facility]    Dictated by (CST): Hitesh Holm MD on 5/23/2024 at 5:42 PM     Finalized by (CST): Hitesh Holm MD on 5/23/2024 at 5:43 PM       CT ABDOMEN+PELVIS(CONTRAST ONLY)(CPT=74177)    Result Date: 5/17/2024  CONCLUSION:   1.  Acute diverticulitis involving the distal descending colon without abscess.  2. Stable right lower lobe pulmonary nodule consistent with granulomatous disease.   LOCATION:  [facility]   Dictated by (CST): Severino Lopez MD on 5/17/2024 at 9:17 AM     Finalized by (CST): Severino Lopez MD on 5/17/2024 at 9:20 AM           Differential diagnosis include, but is not limited to, sciatica, spasm, dehydration, cramping    Wife at bedside helpful to provide information on the history presenting illness    External chart review demonstrates his  recent ER visit including his CT angiogram of the chest, CT abdomen pelvis with IV contrast, ultrasound of the right lower extremity negative for DVT.  Also telephone encounter with PCP 2 days ago      53-year-old male with piriformis pain that radiates down the right leg with radicular symptoms.  No low back pain on exam.  Reflex intact.  Dermatomes intact.  Strength intact.  After IV medications here he is up and ambulatory albeit on the hallway to the restroom and back with minimal discomfort.  When he first arrived here with a hard time standing and/or sitting secondary to the sciatica-like pain.  Significantly improved.  Will keep him on a short course of Norco, Valium, prednisone and ibuprofen.  Given spine follow-up as needed.  He is in agreement, as his wife, heating and stretching otherwise, bending at the knees, no heavy lifting.  Had recent blood work and extensive imaging.  Do not believe that any more imaging or blood work would be of any benefit to him.  He has no red flag signs for spinal emergency at this time.  Well-appearing and nontoxic, discharge home at his request, shared decision making utilized and return precaution provided after discussion of risk benefits and alternatives    Patient was screened and evaluated during this visit.  As the treating physician attending to the patient, I determined within reasonable clinical confidence and prior to discharge, that an emergency medical condition was not or was no longer present.  There was no indication for further evaluation, treatment, or admission on an emergency basis.  Comprehensive verbal and written discharge and follow-up instructions were provided to help prevent relapse or worsening.  Patient was instructed to follow-up with their primary care provider for further evaluation and treatment, return immediately to ER for worsening, concerning, new, or changing/persisting symptoms. I discussed the case with the patient and they had no  questions, complaints, or concerns.  Patient was comfortable going home.     Per the discharge paperwork, patients are encouraged to and given instructions on how to sign up for [facility], where they have access to their records, including any/all incidental findings.     This note was prepared using Dragon Medical voice recognition dictation software. As a result errors may occur. When identified these errors have been corrected. While every attempt is made to correct errors during dictation discrepancies may still exist    Note to patient: The 21st Century Cures Act makes medical notes like these available to patients in the interest of transparency. However, this is a medical document intended as peer to peer communication. It is written in medical language and may contain abbreviations or verbiage that are unfamiliar. It may appear blunt or direct. Medical documents are intended to carry relevant information, facts as evident, and the clinical opinion of the practitioner.                                Medical Decision Making      Disposition and Plan     Clinical Impression:  1. Back pain of lumbar region with sciatica         Disposition:  Discharge  5/26/2024  1:26 pm    Follow-up:  Mark Rebolledo MD  [address] [facility] [address]  [facility] [address]  [phone]    Follow up      Ayse Luo DO  [address] [facility] [address]  [facility] [id number] [phone]    Follow up            Medications Prescribed:  Current Discharge Medication List        START taking these medications    Details   diazePAM 2 MG Oral Tab Take 1 tablet (2 mg total) by mouth 3 (three) times daily as needed (spasm).  Qty: 12 tablet, Refills: 0    Associated Diagnoses: Back pain of lumbar region with sciatica      HYDROcodone-acetaminophen 5-325 MG Oral Tab Take 1 tablet by mouth every 8 (eight) hours as needed for Pain.  Qty: 12 tablet, Refills: 0    Associated Diagnoses: Back pain of lumbar region with sciatica      predniSONE 20 MG Oral Tab Take 2  tablets (40 mg total) by mouth daily for 4 days.  Qty: 8 tablet, Refills: 0      ibuprofen 600 MG Oral Tab Take 1 tablet (600 mg total) by mouth every 8 (eight) hours as needed for Pain or Fever.  Qty: 30 tablet, Refills: 0

## 2024-05-28 ENCOUNTER — TELEPHONE (OUTPATIENT)
Dept: ORTHOPEDICS CLINIC | Facility: CLINIC | Age: 54
End: 2024-05-28

## 2024-05-28 DIAGNOSIS — M54.50 LOW BACK PAIN, UNSPECIFIED BACK PAIN LATERALITY, UNSPECIFIED CHRONICITY, UNSPECIFIED WHETHER SCIATICA PRESENT: Primary | ICD-10-CM

## 2024-05-28 NOTE — TELEPHONE ENCOUNTER
Patient is scheduled for lower back pain. Please advise if imaging is needed.    Future Appointments   Date Time Provider Department Center   6/3/2024  2:20 PM Mark Rebolledo MD [facility]   7/16/2024 10:30 AM Ayse Luo,  [facility] [address]

## 2024-05-29 ENCOUNTER — OFFICE VISIT (OUTPATIENT)
Dept: FAMILY MEDICINE CLINIC | Facility: CLINIC | Age: 54
End: 2024-05-29
Payer: COMMERCIAL

## 2024-05-29 VITALS
SYSTOLIC BLOOD PRESSURE: 112 MMHG | WEIGHT: 283 LBS | HEART RATE: 82 BPM | DIASTOLIC BLOOD PRESSURE: 74 MMHG | RESPIRATION RATE: 16 BRPM | OXYGEN SATURATION: 97 % | BODY MASS INDEX: 44.42 KG/M2 | HEIGHT: 67 IN

## 2024-05-29 DIAGNOSIS — K57.92 DIVERTICULITIS: Primary | ICD-10-CM

## 2024-05-29 DIAGNOSIS — M54.31 RIGHT SCIATIC NERVE PAIN: ICD-10-CM

## 2024-05-29 PROCEDURE — 99496 TRANSJ CARE MGMT HIGH F2F 7D: CPT | Performed by: FAMILY MEDICINE

## 2024-05-29 RX ORDER — HYDROCODONE BITARTRATE AND ACETAMINOPHEN 10; 325 MG/1; MG/1
1 TABLET ORAL EVERY 6 HOURS PRN
Qty: 40 TABLET | Refills: 0 | Status: SHIPPED | OUTPATIENT
Start: 2024-05-29

## 2024-05-29 NOTE — PROGRESS NOTES
Subjective:   Valerio Felipe is a 53 year old male who presents for hospital follow up.   He was discharged from [facility] to Home or Self Care  Admission Date: 5/26/24   Discharge Date: 5/26/24  Hospital Discharge Diagnosis:     #Acute diverticulitis without abscess     #Essential hypertension     #Hyperlipidemia     #CML     #History of gastric bypass surgery     #Morbid obesity  -BMI 40.72          Interactive contact within 2 business days post discharge first initiated on Date: 5/21/2024    During the visit, the following was completed:  Obtained and reviewed discharge summary, continuity of care documents, and Hospitalist notes  Reviewed Labs (CBC, CMP)    HPI: Now with severe right sciatica   None of the meds given so far have worked nor lessened any of the pain   Will see Dr. Rebolledo tomorrow     No further abd pain   Already has c-scope referral to [facility]     History/Other:   Current Medications:  Medication Reconciliation:  I am aware of an inpatient discharge within the last 30 days.  The discharge medication list has been reconciled with the patient's current medication list and reviewed by me.  See medication list for additions of new medication, and changes to current doses of medications and discontinued medications.  Outpatient Medications Marked as Taking for the 5/29/24 encounter (Office Visit) with Ayse Luo, DO   Medication Sig    HYDROcodone-acetaminophen (NORCO)  MG Oral Tab Take 1 tablet by mouth every 6 (six) hours as needed for Pain.    diazePAM 2 MG Oral Tab Take 1 tablet (2 mg total) by mouth 3 (three) times daily as needed (spasm).    HYDROcodone-acetaminophen 5-325 MG Oral Tab Take 1 tablet by mouth every 8 (eight) hours as needed for Pain.    predniSONE 20 MG Oral Tab Take 2 tablets (40 mg total) by mouth daily for 4 days.    ibuprofen 600 MG Oral Tab Take 1 tablet (600 mg total) by mouth every 8 (eight) hours as needed for Pain or Fever.    amphetamine-dextroamphetamine  (ADDERALL) 20 MG Oral Tab Take 1 tablet (20 mg total) by mouth 2 (two) times daily.    diclofenac 75 MG Oral Tab EC Take 1 tablet (75 mg total) by mouth 2 (two) times daily as needed.    losartan 100 MG Oral Tab Take 1 tablet (100 mg total) by mouth daily.    spironolactone 50 MG Oral Tab Take 1 tablet (50 mg total) by mouth daily.    atorvastatin 10 MG Oral Tab Take 1 tablet (10 mg total) by mouth daily.    Dasatinib (SPRYCEL) 100 MG Oral Tab Take 1 tablet by mouth daily.    Labetalol HCl 200 MG Oral Tab Take 1 tablet (200 mg total) by mouth 2 (two) times daily.    Vitamin B-12 1000 MCG Oral Tab Take 1 tablet (1,000 mcg total) by mouth daily.    Cholecalciferol (VITAMIN D3) 1.25 MG (98621 UT) Oral Cap Take 1 tablet by mouth once a week.    Ascorbic Acid (VITAMIN C) 1000 MG Oral Tab Take 1 tablet (1,000 mg total) by mouth daily.       Review of Systems:  GENERAL: weight stable, energy stable, no sweating  SKIN: denies any unusual skin lesions  EYES: denies blurred vision or double vision  HEENT: denies nasal congestion, sinus pain or ST  LUNGS: denies shortness of breath with exertion  CARDIOVASCULAR: denies chest pain on exertion or palpitations  GI: denies abdominal pain, denies heartburn, denies diarrhea  MUSCULOSKELETAL: denies pain, normal range of motion of extremities  NEURO: denies headaches, denies dizziness, denies weakness  PSYCHE: denies depression or anxiety  HEMATOLOGIC: denies hx of anemia, or bruising, denies bleeding  ENDOCRINE: denies thyroid history  ALL/ASTHMA: denies hx of allergy or asthma    Objective:   No LMP for male patient.  Estimated body mass index is 44.32 kg/m² as calculated from the following:    Height as of this encounter: 5' 7\" (1.702 m).    Weight as of this encounter: 283 lb (128.4 kg).   /74   Pulse 82   Resp 16   Ht 5' 7\" (1.702 m)   Wt 283 lb (128.4 kg)   SpO2 97%   BMI 44.32 kg/m²    GENERAL: well developed, well nourished, in no apparent distress  SKIN: no  rashes, no suspicious lesions  HEENT: atraumatic, normocephalic, ears and throat are clear  EYES: PERRLA, EOMI, conjunctiva are clear  NECK: supple, no adenopathy, no bruits  CHEST: no chest tenderness  LUNGS: clear to auscultation  CARDIO: RRR without murmur  GI: good BS's, no masses, HSM or tenderness  MUSCULOSKELETAL: back is not tender, FROM of the extremities  EXTREMITIES: no cyanosis, clubbing or edema  NEURO: Oriented times three, cranial nerves are intact, motor and sensory are grossly intact    Assessment & Plan:   1. Diverticulitis (Primary)- c-scope overdue   2. Right sciatic nerve pain- increase dose of norco   -     HYDROcodone-Acetaminophen; Take 1 tablet by mouth every 6 (six) hours as needed for Pain.  Dispense: 40 tablet; Refill: 0  -     Pain Management Referral - In Network  -     Physical Therapy Referral - [facility] Location      Return in 3 months (on 8/29/2024).

## 2024-05-30 ENCOUNTER — OFFICE VISIT (OUTPATIENT)
Dept: ORTHOPEDICS CLINIC | Facility: CLINIC | Age: 54
End: 2024-05-30

## 2024-05-30 ENCOUNTER — HOSPITAL ENCOUNTER (OUTPATIENT)
Dept: GENERAL RADIOLOGY | Age: 54
Discharge: HOME | End: 2024-05-30
Attending: STUDENT IN AN ORGANIZED HEALTH CARE EDUCATION/TRAINING PROGRAM
Payer: COMMERCIAL

## 2024-05-30 DIAGNOSIS — M54.50 LOW BACK PAIN, UNSPECIFIED BACK PAIN LATERALITY, UNSPECIFIED CHRONICITY, UNSPECIFIED WHETHER SCIATICA PRESENT: ICD-10-CM

## 2024-05-30 DIAGNOSIS — M54.16 LUMBAR RADICULOPATHY: Primary | ICD-10-CM

## 2024-05-30 PROCEDURE — 72100 X-RAY EXAM L-S SPINE 2/3 VWS: CPT | Performed by: STUDENT IN AN ORGANIZED HEALTH CARE EDUCATION/TRAINING PROGRAM

## 2024-05-30 PROCEDURE — 99214 OFFICE O/P EST MOD 30 MIN: CPT | Performed by: STUDENT IN AN ORGANIZED HEALTH CARE EDUCATION/TRAINING PROGRAM

## 2024-05-30 NOTE — H&P
[facility] - ORTHOPEDICS  [address], [address], [place] [address]  [address] [place] [id number]  [phone]     NEW PATIENT VISIT - HISTORY AND PHYSICAL EXAMINATION     Name: Valerio Felipe   MRN: [id number]  Date: 05/30/24       CC: back and leg pain    REFERRED BY: Ayse Luo DO    HPI:   Valerio Felipe is a very pleasant 53 year old male who presents today for evaluation of back and leg pain. The distribution of symptoms are: 10% backpain and 90% leg pain. The symptoms began 2 week(s) ago without any significant injury. Since the onset, the symptoms have become slowly worse over time. Patient feels pain is aggravated by bending, sitting. The patient reports  numbness and  weakness.  The symptom characteristics are as follows: Patient is a 53-year-old male presenting with low back pain radiating down right lower extremity with worsening symptoms over the past 2 weeks.  Patient has been to the emergency department and seen primary care physician.  Patient has been prescribed various medications including oral steroids, anti-inflammatory, Norco.  Patient cannot perform activities of daily living due to severe diffuse symptoms.     Prior spine surgery: none.    Bowel and bladder symptoms: absent.    The patient has not had issues with balance and/or hand dexterity problems such as changes in penmanship or the use of buttons or zippers.    Treatment up to this time has included:    Evaluation: PCP and ED  NSAIDS: have been partially helpful  Narcotic use: Norco PRN  Physical therapy: None  Spinal injections: None  Others:       PMH:   Past Medical History:    Essential hypertension    High blood pressure    High cholesterol    Leukemia (HCC)    Leukemia (HCC)    Personal history of antineoplastic chemotherapy    oral chemo (Sprycel)    Smoking       PAST SURGICAL HX:  Past Surgical History:   Procedure Laterality Date    Colonoscopy  07/2016    diverticulosis,  polyps (adenoma/hp). repeat 5 years    Colonoscopy N/A 05/17/2021    Procedure: COLONOSCOPY;  Surgeon: Fortino Maier MD;  Location:  ENDOSCOPY    Colonoscopy,biopsy N/A 07/11/2016    Procedure: COLONOSCOPY, POSSIBLE BIOPSY, POSSIBLE POLYPECTOMY 10962;  Surgeon: Fortino Maier MD;  Location: [facility]    Colonoscopy,remv lesn,snare N/A 07/11/2016    Procedure: COLONOSCOPY, POSSIBLE BIOPSY, POSSIBLE POLYPECTOMY 38345;  Surgeon: Fortino Maier MD;  Location: [facility]    Create eardrum opening,gen anesth  10/07/2021    Knee arthroscopy      Lap gastric bypass/hernando-en-y      Other surgical history  2005    Rotator Cuff Repair left side    Other surgical history Right     meniscus       FAMILY HX:  Family History   Problem Relation Age of Onset    Other (COPD [Other]) Father     Heart Attack Father     Stroke Father     Breast Cancer Mother     Diabetes Mother        ALLERGIES:  Patient has no known allergies.    MEDICATIONS:   Current Outpatient Medications   Medication Sig Dispense Refill    HYDROcodone-acetaminophen (NORCO)  MG Oral Tab Take 1 tablet by mouth every 6 (six) hours as needed for Pain. 40 tablet 0    ibuprofen 600 MG Oral Tab Take 1 tablet (600 mg total) by mouth every 8 (eight) hours as needed for Pain or Fever. 30 tablet 0    amphetamine-dextroamphetamine (ADDERALL) 20 MG Oral Tab Take 1 tablet (20 mg total) by mouth 2 (two) times daily. 60 tablet 0    diclofenac 75 MG Oral Tab EC Take 1 tablet (75 mg total) by mouth 2 (two) times daily as needed. 60 tablet 1    losartan 100 MG Oral Tab Take 1 tablet (100 mg total) by mouth daily. 90 tablet 0    spironolactone 50 MG Oral Tab Take 1 tablet (50 mg total) by mouth daily. 90 tablet 0    atorvastatin 10 MG Oral Tab Take 1 tablet (10 mg total) by mouth daily. 90 tablet 0    Dasatinib (SPRYCEL) 100 MG Oral Tab Take 1 tablet by mouth daily.  0    Labetalol HCl 200 MG Oral Tab Take 1 tablet (200 mg total) by mouth 2 (two) times  daily. 60 tablet 3    Vitamin B-12 1000 MCG Oral Tab Take 1 tablet (1,000 mcg total) by mouth daily.      Cholecalciferol (VITAMIN D3) 1.25 MG (46149 UT) Oral Cap Take 1 tablet by mouth once a week.      Ascorbic Acid (VITAMIN C) 1000 MG Oral Tab Take 1 tablet (1,000 mg total) by mouth daily.         ROS: A comprehensive 14 point review of systems was performed and was negative aside from the aforementioned per history of present illness.    SOCIAL HX:  Social History     Tobacco Use    Smoking status: Former     Current packs/day: 0.00     Types: Cigarettes     Quit date: 8/10/2018     Years since quittin.8    Smokeless tobacco: Never   Substance Use Topics    Alcohol use: Yes     Comment: once per month       PE:   There were no vitals filed for this visit.  Estimated body mass index is 44.32 kg/m² as calculated from the following:    Height as of 24: 5' 7\" (1.702 m).    Weight as of 24: 283 lb (128.4 kg).    Physical Exam  Constitutional:       Appearance: Normal appearance.   HENT:      Head: Normocephalic and atraumatic.   Eyes:      Extraocular Movements: Extraocular movements intact.   Cardiovascular:      Pulses: Normal pulses. Skin warm and well perfused.  Pulmonary:      Effort: Pulmonary effort is normal. No respiratory distress.   Skin:     General: Skin is warm.   Psychiatric:         Mood and Affect: Mood normal.     Spine Exam:    Normal gait without difficulty  Able to heel, toe, tandem gait without difficulty  Level shoulders and hips in even stance    Restricted L-spine ROM    No tenderness to palpation of L-spine    Straight leg raise test: positive    Sustained clonus: negative    LE Strength: 5/5 IP QUAD TA EHL GSC on the left, 4/5 TA on the right  LE Sensation: normal in L2-S1 distribution  LE reflexes: normal    Radiographic Examination/Diagnostics:  XR as well as personally viewed, independently interpreted and radiology report was reviewed.  X-ray of the lumbar spine  demonstrates degenerative disc disease at L5-S1    IMPRESSION: Valerio Felipe is a 53 year old male with back pain and sciatica with weakness.  Symptoms have been refractory to medical management and patient has been unable to perform activities of daily living requiring multiple ED and clinic visits.    PLAN:   - After my face-to-face evaluation, given the clinical presentation, physical examination, as well as XR findings, there is high suspicion for nerve compression. To further evaluate patient's spinal pain, lumbar spine MRI was ordered.   - Agree with pain clinic referral for GINA    FOLLOW-UP:  We will see him back in follow-up in after updated images, or sooner if any problems arise. Patient understands and agrees with plan.      Mark Rebolledo MD  Orthopedic Spine Surgeon  [facility]   [address] [place] [address], [address] [place] [address].[email or web address]  [email or web address]  t: [phone]   f: [phone]        This note was dictated using Dragon software.  While it was briefly proofread prior to completion, some grammatical, spelling, and word choice errors due to dictation may still occur.

## 2024-06-04 ENCOUNTER — HOSPITAL ENCOUNTER (INPATIENT)
Facility: HOSPITAL | Age: 54
LOS: 5 days | Discharge: HOME | DRG: 552 | End: 2024-06-10
Attending: EMERGENCY MEDICINE | Admitting: INTERNAL MEDICINE
Payer: COMMERCIAL

## 2024-06-04 ENCOUNTER — APPOINTMENT (OUTPATIENT)
Dept: MRI IMAGING | Facility: HOSPITAL | Age: 54
DRG: 552 | End: 2024-06-04
Attending: EMERGENCY MEDICINE
Payer: COMMERCIAL

## 2024-06-04 DIAGNOSIS — M54.9 INTRACTABLE BACK PAIN: Primary | ICD-10-CM

## 2024-06-04 DIAGNOSIS — M54.16 LUMBAR RADICULOPATHY: ICD-10-CM

## 2024-06-04 LAB
ANION GAP SERPL CALC-SCNC: 7 MMOL/L (ref 0–18)
BASOPHILS # BLD AUTO: 0.06 X10(3) UL (ref 0–0.2)
BASOPHILS NFR BLD AUTO: 0.5 %
BUN BLD-MCNC: 26 MG/DL (ref 9–23)
CALCIUM BLD-MCNC: 8.9 MG/DL (ref 8.5–10.1)
CHLORIDE SERPL-SCNC: 107 MMOL/L (ref 98–112)
CO2 SERPL-SCNC: 25 MMOL/L (ref 21–32)
CREAT BLD-MCNC: 1.55 MG/DL
EGFRCR SERPLBLD CKD-EPI 2021: 53 ML/MIN/1.73M2 (ref 60–?)
EOSINOPHIL # BLD AUTO: 0.82 X10(3) UL (ref 0–0.7)
EOSINOPHIL NFR BLD AUTO: 6.7 %
ERYTHROCYTE [DISTWIDTH] IN BLOOD BY AUTOMATED COUNT: 13.3 %
GLUCOSE BLD-MCNC: 111 MG/DL (ref 70–99)
HCT VFR BLD AUTO: 33.4 %
HGB BLD-MCNC: 11.2 G/DL
IMM GRANULOCYTES # BLD AUTO: 0.04 X10(3) UL (ref 0–1)
IMM GRANULOCYTES NFR BLD: 0.3 %
LYMPHOCYTES # BLD AUTO: 1.32 X10(3) UL (ref 1–4)
LYMPHOCYTES NFR BLD AUTO: 10.8 %
MCH RBC QN AUTO: 31.5 PG (ref 26–34)
MCHC RBC AUTO-ENTMCNC: 33.5 G/DL (ref 31–37)
MCV RBC AUTO: 93.8 FL
MONOCYTES # BLD AUTO: 1.72 X10(3) UL (ref 0.1–1)
MONOCYTES NFR BLD AUTO: 14 %
NEUTROPHILS # BLD AUTO: 8.31 X10 (3) UL (ref 1.5–7.7)
NEUTROPHILS # BLD AUTO: 8.31 X10(3) UL (ref 1.5–7.7)
NEUTROPHILS NFR BLD AUTO: 67.7 %
OSMOLALITY SERPL CALC.SUM OF ELEC: 293 MOSM/KG (ref 275–295)
PLATELET # BLD AUTO: 239 10(3)UL (ref 150–450)
POTASSIUM SERPL-SCNC: 4.3 MMOL/L (ref 3.5–5.1)
RBC # BLD AUTO: 3.56 X10(6)UL
SODIUM SERPL-SCNC: 139 MMOL/L (ref 136–145)
WBC # BLD AUTO: 12.3 X10(3) UL (ref 4–11)

## 2024-06-04 PROCEDURE — 72148 MRI LUMBAR SPINE W/O DYE: CPT | Performed by: EMERGENCY MEDICINE

## 2024-06-04 RX ORDER — KETOROLAC TROMETHAMINE 15 MG/ML
15 INJECTION, SOLUTION INTRAMUSCULAR; INTRAVENOUS ONCE
Status: COMPLETED | OUTPATIENT
Start: 2024-06-04 | End: 2024-06-04

## 2024-06-04 RX ORDER — HYDROMORPHONE HYDROCHLORIDE 1 MG/ML
1 INJECTION, SOLUTION INTRAMUSCULAR; INTRAVENOUS; SUBCUTANEOUS EVERY 30 MIN PRN
Status: COMPLETED | OUTPATIENT
Start: 2024-06-04 | End: 2024-06-05

## 2024-06-04 NOTE — PROGRESS NOTES
Multiple attempts to reach pt and messages left with no return call.  Patient went in for HFU appt with PCP on 5/29/24.  Encounter closing.

## 2024-06-05 ENCOUNTER — TELEPHONE (OUTPATIENT)
Dept: PAIN CLINIC | Facility: CLINIC | Age: 54
End: 2024-06-05

## 2024-06-05 DIAGNOSIS — M54.16 LUMBAR RADICULOPATHY: Primary | ICD-10-CM

## 2024-06-05 PROBLEM — M54.9 INTRACTABLE BACK PAIN: Status: ACTIVE | Noted: 2024-06-05

## 2024-06-05 LAB
BASOPHILS # BLD AUTO: 0.06 X10(3) UL (ref 0–0.2)
BASOPHILS NFR BLD AUTO: 0.5 %
EOSINOPHIL # BLD AUTO: 0.73 X10(3) UL (ref 0–0.7)
EOSINOPHIL NFR BLD AUTO: 6.5 %
ERYTHROCYTE [DISTWIDTH] IN BLOOD BY AUTOMATED COUNT: 13.3 %
HCT VFR BLD AUTO: 31.7 %
HGB BLD-MCNC: 10.1 G/DL
IMM GRANULOCYTES # BLD AUTO: 0.04 X10(3) UL (ref 0–1)
IMM GRANULOCYTES NFR BLD: 0.4 %
LYMPHOCYTES # BLD AUTO: 1.18 X10(3) UL (ref 1–4)
LYMPHOCYTES NFR BLD AUTO: 10.5 %
MCH RBC QN AUTO: 30.8 PG (ref 26–34)
MCHC RBC AUTO-ENTMCNC: 31.9 G/DL (ref 31–37)
MCV RBC AUTO: 96.6 FL
MONOCYTES # BLD AUTO: 1.65 X10(3) UL (ref 0.1–1)
MONOCYTES NFR BLD AUTO: 14.7 %
NEUTROPHILS # BLD AUTO: 7.58 X10 (3) UL (ref 1.5–7.7)
NEUTROPHILS # BLD AUTO: 7.58 X10(3) UL (ref 1.5–7.7)
NEUTROPHILS NFR BLD AUTO: 67.4 %
PLATELET # BLD AUTO: 217 10(3)UL (ref 150–450)
RBC # BLD AUTO: 3.28 X10(6)UL
WBC # BLD AUTO: 11.2 X10(3) UL (ref 4–11)

## 2024-06-05 PROCEDURE — 99223 1ST HOSP IP/OBS HIGH 75: CPT | Performed by: STUDENT IN AN ORGANIZED HEALTH CARE EDUCATION/TRAINING PROGRAM

## 2024-06-05 PROCEDURE — 99223 1ST HOSP IP/OBS HIGH 75: CPT | Performed by: INTERNAL MEDICINE

## 2024-06-05 PROCEDURE — 99223 1ST HOSP IP/OBS HIGH 75: CPT | Performed by: ANESTHESIOLOGY

## 2024-06-05 RX ORDER — MELATONIN
3 NIGHTLY PRN
Status: DISCONTINUED | OUTPATIENT
Start: 2024-06-05 | End: 2024-06-10

## 2024-06-05 RX ORDER — ACETAMINOPHEN 325 MG/1
650 TABLET ORAL EVERY 4 HOURS PRN
Status: DISCONTINUED | OUTPATIENT
Start: 2024-06-05 | End: 2024-06-10

## 2024-06-05 RX ORDER — POLYETHYLENE GLYCOL 3350 17 G/17G
17 POWDER, FOR SOLUTION ORAL DAILY PRN
Status: DISCONTINUED | OUTPATIENT
Start: 2024-06-05 | End: 2024-06-10

## 2024-06-05 RX ORDER — BENZONATATE 100 MG/1
200 CAPSULE ORAL 3 TIMES DAILY PRN
Status: DISCONTINUED | OUTPATIENT
Start: 2024-06-05 | End: 2024-06-10

## 2024-06-05 RX ORDER — HYDROMORPHONE HYDROCHLORIDE 1 MG/ML
0.5 INJECTION, SOLUTION INTRAMUSCULAR; INTRAVENOUS; SUBCUTANEOUS EVERY 30 MIN PRN
Status: ACTIVE | OUTPATIENT
Start: 2024-06-05 | End: 2024-06-05

## 2024-06-05 RX ORDER — HYDROMORPHONE HYDROCHLORIDE 1 MG/ML
0.4 INJECTION, SOLUTION INTRAMUSCULAR; INTRAVENOUS; SUBCUTANEOUS EVERY 2 HOUR PRN
Status: DISCONTINUED | OUTPATIENT
Start: 2024-06-05 | End: 2024-06-10

## 2024-06-05 RX ORDER — LABETALOL 200 MG/1
200 TABLET, FILM COATED ORAL
Status: DISCONTINUED | OUTPATIENT
Start: 2024-06-05 | End: 2024-06-10

## 2024-06-05 RX ORDER — PROCHLORPERAZINE EDISYLATE 5 MG/ML
5 INJECTION INTRAMUSCULAR; INTRAVENOUS EVERY 8 HOURS PRN
Status: DISCONTINUED | OUTPATIENT
Start: 2024-06-05 | End: 2024-06-10

## 2024-06-05 RX ORDER — HYDROMORPHONE HYDROCHLORIDE 1 MG/ML
0.8 INJECTION, SOLUTION INTRAMUSCULAR; INTRAVENOUS; SUBCUTANEOUS EVERY 2 HOUR PRN
Status: DISCONTINUED | OUTPATIENT
Start: 2024-06-05 | End: 2024-06-10

## 2024-06-05 RX ORDER — METHOCARBAMOL 500 MG/1
500 TABLET, FILM COATED ORAL EVERY 12 HOURS
Status: DISCONTINUED | OUTPATIENT
Start: 2024-06-06 | End: 2024-06-10

## 2024-06-05 RX ORDER — HYDROCODONE BITARTRATE AND ACETAMINOPHEN 5; 325 MG/1; MG/1
1 TABLET ORAL EVERY 4 HOURS PRN
Status: DISCONTINUED | OUTPATIENT
Start: 2024-06-05 | End: 2024-06-09

## 2024-06-05 RX ORDER — BISACODYL 10 MG
10 SUPPOSITORY, RECTAL RECTAL
Status: DISCONTINUED | OUTPATIENT
Start: 2024-06-05 | End: 2024-06-10

## 2024-06-05 RX ORDER — HEPARIN SODIUM 5000 [USP'U]/ML
5000 INJECTION, SOLUTION INTRAVENOUS; SUBCUTANEOUS EVERY 8 HOURS SCHEDULED
Status: DISCONTINUED | OUTPATIENT
Start: 2024-06-06 | End: 2024-06-10

## 2024-06-05 RX ORDER — ONDANSETRON 2 MG/ML
4 INJECTION INTRAMUSCULAR; INTRAVENOUS EVERY 6 HOURS PRN
Status: DISCONTINUED | OUTPATIENT
Start: 2024-06-05 | End: 2024-06-10

## 2024-06-05 RX ORDER — HYDROCODONE BITARTRATE AND ACETAMINOPHEN 5; 325 MG/1; MG/1
2 TABLET ORAL EVERY 4 HOURS PRN
Status: DISCONTINUED | OUTPATIENT
Start: 2024-06-05 | End: 2024-06-09

## 2024-06-05 RX ORDER — SODIUM CHLORIDE, SODIUM LACTATE, POTASSIUM CHLORIDE, CALCIUM CHLORIDE 600; 310; 30; 20 MG/100ML; MG/100ML; MG/100ML; MG/100ML
INJECTION, SOLUTION INTRAVENOUS CONTINUOUS
Status: DISCONTINUED | OUTPATIENT
Start: 2024-06-05 | End: 2024-06-05

## 2024-06-05 RX ORDER — SENNOSIDES 8.6 MG
17.2 TABLET ORAL NIGHTLY PRN
Status: DISCONTINUED | OUTPATIENT
Start: 2024-06-05 | End: 2024-06-10

## 2024-06-05 RX ORDER — ECHINACEA PURPUREA EXTRACT 125 MG
1 TABLET ORAL
Status: DISCONTINUED | OUTPATIENT
Start: 2024-06-05 | End: 2024-06-10

## 2024-06-05 RX ORDER — ACETAMINOPHEN 500 MG
1000 TABLET ORAL EVERY 8 HOURS PRN
Status: DISCONTINUED | OUTPATIENT
Start: 2024-06-05 | End: 2024-06-10

## 2024-06-05 RX ORDER — ATORVASTATIN CALCIUM 10 MG/1
10 TABLET, FILM COATED ORAL DAILY
Status: DISCONTINUED | OUTPATIENT
Start: 2024-06-05 | End: 2024-06-10

## 2024-06-05 RX ORDER — METHOCARBAMOL 100 MG/ML
500 INJECTION, SOLUTION INTRAMUSCULAR; INTRAVENOUS EVERY 12 HOURS
Qty: 2000 MG | Refills: 0 | Status: COMPLETED | OUTPATIENT
Start: 2024-06-05 | End: 2024-06-05

## 2024-06-05 RX ORDER — HYDROMORPHONE HYDROCHLORIDE 1 MG/ML
0.2 INJECTION, SOLUTION INTRAMUSCULAR; INTRAVENOUS; SUBCUTANEOUS EVERY 2 HOUR PRN
Status: DISCONTINUED | OUTPATIENT
Start: 2024-06-05 | End: 2024-06-10

## 2024-06-05 RX ORDER — ENEMA 19; 7 G/133ML; G/133ML
1 ENEMA RECTAL ONCE AS NEEDED
Status: DISCONTINUED | OUTPATIENT
Start: 2024-06-05 | End: 2024-06-10

## 2024-06-05 NOTE — ED PROVIDER NOTES
Patient Seen in: [facility] Emergency Department      History     Chief Complaint   Patient presents with    Back Pain     Stated Complaint: sciatic pain, hx of    Subjective:   HPI    53-year-old male presents to emergency room with chief complaint of low back pain.  Patient reports symptoms have been present over the last month or so, states pain starts in the right lower back and will travel to the calf.  Feels a burning sensation, states he does feel some weakness from the pain, denies loss of bowel or bladder control.  Denies symptoms in the left lower extremity.  Patient has seen his primary care physician for the symptoms, followed up with spine Dr Rebolledo who evaluated the patient, I reviewed clinic visit, x-rays were performed at that time and patient was noted to have degenerative disc disease at L5-S1, MRI was recommended and patient was also referred to pain clinic.  Patient reports he recently finished a course of steroids, has been using Norco as well as muscle relaxants, the last few days his pain has been increasing, feels that he cannot sleep or do any of his daily activities.  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              No pertinent social history.            Review of Systems    Positive for stated complaint: sciatic pain, hx of  Other systems are as noted in HPI.  Constitutional and vital signs reviewed.      All other systems reviewed and negative except as noted above.    Physical Exam     ED Triage Vitals [06/04/24 2038]   /85   Pulse 101   Resp 16   Temp 97.2 °F (36.2 °C)   Temp src Temporal   SpO2 95 %   O2 Device None (Room air)       Current Vitals:   Vital Signs  BP:  133/86  Pulse: 95  Resp: 16  Temp: 97.2 °F (36.2 °C)  Temp src: Temporal  MAP (mmHg): (!) 61    Oxygen Therapy  SpO2: 98 %  O2 Device: None (Room air)            Physical Exam    GENERAL: Patient is awake, alert,   HEENT:  no scleral icterus.  Mucous membranes are moist  Back: No midline thoracic or lumbar spine tenderness or step-off, there is tenderness to the right of the lower lumbar spine.  No rashes or erythema.  HEART: Regular rate and rhythm, no murmurs.  LUNGS: Clear to auscultation bilaterally.  No Rales, no rhonchi, no wheezing, no stridor.  ABDOMEN: Soft, nondistended,non tender,.. No CVA tenderness  EXTREMITIES: No peripheral edema, no calf tenderness.  NEUROLOGIC EXAM: Muscle strength 5/5 bilateral lower extremities, no foot drop bilaterally, dorsi and plantarflexion intact bilaterally.  No saddle anesthesia.  ED Course     Labs Reviewed   BASIC METABOLIC PANEL (8) - Abnormal; Notable for the following components:       Result Value    Glucose 111 (*)     BUN 26 (*)     Creatinine 1.55 (*)     eGFR-Cr 53 (*)     All other components within normal limits   CBC W/ DIFFERENTIAL - Abnormal; Notable for the following components:    WBC 12.3 (*)     RBC 3.56 (*)     HGB 11.2 (*)     HCT 33.4 (*)     Neutrophil Absolute Prelim 8.31 (*)     Neutrophil Absolute 8.31 (*)     Monocyte Absolute 1.72 (*)     Eosinophil Absolute 0.82 (*)     All other components within normal limits   CBC WITH DIFFERENTIAL WITH PLATELET    Narrative:     The following orders were created for panel order CBC With Differential With Platelet.  Procedure                               Abnormality         Status                     ---------                               -----------         ------                     CBC W/ DIFFERENTIAL[[id number]]          Abnormal            Final result                 Please view results for these tests on the individual orders.   RAINBOW DRAW BLUE                      MDM        Differential diagnosis  before testing includes but not limited to lumbar radiculopathy, sciatica, compression fracture, malignancy/lytic lesion/tumor, which is a medical condition that poses a threat to life/function    Past Medical History/comorbidities-CML      Radiographic images  I personally reviewed the radiographs and my individual interpretation shows MRI did not reveal any mass/tumor on my review  I also reviewed the official reports that showed multilevel degenerative changes, L5-S1 diffuse disc bulge and superimposed right paracentral focal disc extrusion, moderate central canal stenosis.  Impingement descending right S1 nerve root.    Discussion of management (consult/physicians, social work, pharmacy,ect) Dr. Loyola hospitalist    External chart review included records from patient's spine visit reviewed as noted in HPI    Medications Provided: Dilaudid    Course of Events during Emergency Room Visit include IV established blood work obtained.  CBC white count 12.3 hemoglobin 11.2 platelet 239.  Chemistry sodium 139 potassium 4.3 BUN 26 creatinine 1.55 glucose 111.  MRI was performed, discussed results with patient.  Patient received multiple doses of pain medication emergency room, reports he is still quite uncomfortable, will admit for further evaluation, discussed with hospitalist physician.  Patient agrees with plan.    Shared decision making was utilized         Discharge  I have discussed with the patient the results of test, differential diagnosis, treatment plan, warning signs and symptoms which should prompt immediate return.  They expressed understanding of these instructions and agrees to the following plan provided.  They were given written discharge instructions and agrees to return for any concerns and voiced understanding and all questions were answered.    Note to patient: The 21st Century Cures Act makes medical notes like these available to patients in the interest of transparency. However, this is a  medical document intended as peer to peer communication. It is written in medical language and may contain abbreviations or verbiage that are unfamiliar. It may appear blunt or direct. Medical documents are intended to carry relevant information, facts as evident, and the clinical opinion of the practitioner.           Admission disposition: 6/5/2024 12:54 AM                                        Medical Decision Making      Disposition and Plan     Clinical Impression:  1. Intractable back pain    2. Lumbar radiculopathy         Disposition:  Admit  6/5/2024 12:54 am    Follow-up:  No follow-up provider specified.        Medications Prescribed:  Current Discharge Medication List                            Hospital Problems       Present on Admission  Date Reviewed: 6/1/2024            ICD-10-CM Noted POA    * (Principal) Intractable back pain M54.9 6/5/2024 Unknown

## 2024-06-05 NOTE — RESPIRATORY THERAPY NOTE
This author spoke to patient on history of CHRIS (obstructive sleep apnea). Patient stated that he has not worn a cpap in years and currently has refused to wear hospital cpap. No cpap machine in room, this author informed patient that if he changes his mind please let a member of respiratory know to bring one.

## 2024-06-05 NOTE — PROGRESS NOTES
Patient verbalized having clear liquid coming from both his ear.  Patient stated that he has ear tubes placed to both ears 3 months ago and he thinks it might have come off last night during MRI when ear plugs was put on. Ear tubes might have accidentally removed when ear plugs was removed.  Inner ears does appear wet on exam with otoscope.  He denies pain /discomfort to both ears. Dr. Lynn notified.

## 2024-06-05 NOTE — TELEPHONE ENCOUNTER
Per  to add on patient for inpatient injection on 06/06/24 case time of 11:15 AM.     Case placed on OR schedule.     Message sent to RN on patient's floor that he is required to fast 8 hours prior to injection case time.

## 2024-06-05 NOTE — CONSULTS
Name: Valerio Felipe   : 1970   DOS: 2024     Chief complaint: Intractable low back pain    History of present illness:  Valerio Felipe is a 53 year old male with a past medical history of hypertension, high cholesterol, admitted due to intractable low back pain.  The patient complains of 90% right leg pain.  Pain began approximately 3 weeks ago without any injury.  Pain symptoms have progressed over time.  Patient difficulty with ambulation and therefore was admitted.  Rates the pain as 9 out of 10.  Has failed oral steroids, anti-inflammatories, and hydrocodone.    Past Medical History:    Essential hypertension    High blood pressure    High cholesterol    Leukemia (HCC)    Leukemia (HCC)    Personal history of antineoplastic chemotherapy    oral chemo (Sprycel)    Smoking      No current outpatient medications on file.     Past Surgical History:   Procedure Laterality Date    Colonoscopy  2016    diverticulosis, polyps (adenoma/hp). repeat 5 years    Colonoscopy N/A 2021    Procedure: COLONOSCOPY;  Surgeon: Fortino Maier MD;  Location:  ENDOSCOPY    Colonoscopy,biopsy N/A 2016    Procedure: COLONOSCOPY, POSSIBLE BIOPSY, POSSIBLE POLYPECTOMY 88526;  Surgeon: Fortino Maier MD;  Location: [facility]    Colonoscopy,remv lesn,snare N/A 2016    Procedure: COLONOSCOPY, POSSIBLE BIOPSY, POSSIBLE POLYPECTOMY 10489;  Surgeon: Fortino Maier MD;  Location: [facility]    Create eardrum opening,gen anesth  10/07/2021    Knee arthroscopy      Lap gastric bypass/hernando-en-y      Other surgical history      Rotator Cuff Repair left side    Other surgical history Right     meniscus      Family History   Problem Relation Age of Onset    Other (COPD [Other]) Father     Heart Attack Father     Stroke Father     Breast Cancer Mother     Diabetes Mother      Social History     Socioeconomic History    Marital status:    Tobacco Use    Smoking status: Former      Current packs/day: 0.00     Types: Cigarettes     Quit date: 8/10/2018     Years since quittin.8    Smokeless tobacco: Never   Vaping Use    Vaping status: Never Used   Substance and Sexual Activity    Alcohol use: Yes     Comment: once per month    Drug use: Yes     Types: Cannabis     Comment: medical marijuana card   Other Topics Concern    Caffeine Concern Yes     Comment: 1 cup a day     Exercise Yes     Comment: 7 x week      Social Determinants of Health     Food Insecurity: No Food Insecurity (2024)    Food Insecurity     Food Insecurity: Never true   Transportation Needs: No Transportation Needs (2024)    Transportation Needs     Lack of Transportation: No   Physical Activity: Inactive (2019)    Received from [facility], [facility]    Exercise Vital Sign     Days of Exercise per Week: 0 days     Minutes of Exercise per Session: 0 min    Received from [facility], [facility]    Social Connections   Housing Stability: Low Risk  (2024)    Housing Stability     Housing Instability: No       Review of  other systems:  10 point ROS otherwise negative    Physical examination: Valerio is a 53 year old male not in acute distress  /83 (BP Location: Left arm)   Pulse 79   Temp 97.9 °F (36.6 °C) (Oral)   Resp 17   Wt 260 lb (117.9 kg)   SpO2 91%   BMI 40.72 kg/m²    The patient is awake, alert, oriented and corporative. He has a normal affect. The patient ambulates with normal gait.  HEENT: No gross lesion noted. PEERL. No icterus.  Neck and Upper Extremity: Supple. No thyromegaly or lymphadenopathy    Motor Examination:    (R)   (L)  Deltoid:      5    5  Biceps:       5    5  Triceps:      5    5  Wrist Extension:     5    5  Wrist Flexsion:     5    5  Finger Extension:     5    5  Finger Flexsion:     5    5       Cardiovascular system: Regular rate and rhythm.    Respiratory system: Breath sounds equal  bilaterally.    Abdomen: Soft, nontender,   Neurologic:  Cranial nerves II through XII are grossly intact.       Examination of the lower back:   Straight leg raise positive at 30 degrees on the right    Motor Examination:   (R)   (L)   Hip Abduction:   5    5   Hip Flexion:    5    5   Knee Extension:   5    5   Knee Flexion:    5    5   Ant. Tibialis:    5    5  Extensor Hallucis Longus:   5    5  Peroneals:     5    5  Gastrocsoleus:     5    5       Radiology diagnostic studies:   Lumbar MRI reviewed with evidence of disc herniation at L5. there is impingement of the S1 nerve root    Assessment:  Intractable low back pain  Lumbar radiculopathy due to disc      Plan:     The patient is a pleasant 53-year-old gentleman with history of hypertension, high cholesterol admitted overnight due to intractable low back pain.  Patient complains of 3-week history of back pain with right-sided radicular features.  MRI with evidence of disc herniation with mass effect on S1 nerve root.  Therefore recommended trial of lumbar transforaminal epidural steroid injection.  Risk and benefits discussed and patient would like to move forward.        Castro Reed MD MPH  Pain Management

## 2024-06-05 NOTE — ED INITIAL ASSESSMENT (HPI)
Pt arrives to ed w c/o right back pain that travels down to his ankle. Pt here fr same complaint multiple times and is suppose to get an mri but cannot get in till June and pain has worsened. Pt took norco this morning w no relief.

## 2024-06-05 NOTE — PLAN OF CARE
NURSING ADMISSION NOTE      Patient admitted via Cart  Oriented to room.  Safety precautions initiated.  Bed in low position.  Call light in reach.  Aox4, reporting decreased sensation and tingling to RLE, reporting severe pain pain medication given as ordered, IVF started as ordered, spine consult to be called in am.

## 2024-06-05 NOTE — CONSULTS
[facility] - ORTHOPEDICS  [address], [address], [place] [address]  [address] [place] [id number]  [phone]     ORTHOPEDIC SPINE CONSULTATION    Name: Valerio Felipe   MRN: [id number]  Date: 6/4/2024     CC: back and right leg pain    HPI:    Valerio Felipe is a very pleasant 53 year old male who presents today for evaluation of back and leg pain. The distribution of symptoms are: 10% backpain and 90% leg pain. The symptoms began 3 week(s) ago without any significant injury. Since the onset, the symptoms have become slowly worse over time. Patient feels pain is aggravated by bending, sitting. The patient reports  numbness and  weakness.  The symptom characteristics are as follows: Patient is a 53-year-old male presenting with low back pain radiating down right lower extremity with worsening symptoms over the past 2 weeks.  Patient has been to the emergency department and seen primary care physician.  Patient has been prescribed various medications including oral steroids, anti-inflammatory, Norco.  Patient cannot perform activities of daily living due to severe diffuse symptoms.     Patient was seen in clinic on 5/30 and MRI was ordered. Patient is also pending GINA. He returns to ED due to worsening of pain     Prior spine surgery: none.     Bowel and bladder symptoms: absent.     The patient has not had issues with balance and/or hand dexterity problems such as changes in penmanship or the use of buttons or zippers.     Treatment up to this time has included:     Evaluation: PCP and ED  NSAIDS: have been partially helpful  Narcotic use: Norco PRN  Physical therapy: None  Spinal injections: None  Others:   PMH:   Past Medical History:    Essential hypertension    High blood pressure    High cholesterol    Leukemia (HCC)    Leukemia (HCC)    Personal history of antineoplastic chemotherapy    oral chemo (Sprycel)    Smoking       PAST SURGICAL HX:  Past Surgical  History:   Procedure Laterality Date    Colonoscopy  2016    diverticulosis, polyps (adenoma/hp). repeat 5 years    Colonoscopy N/A 2021    Procedure: COLONOSCOPY;  Surgeon: Fortino Maier MD;  Location:  ENDOSCOPY    Colonoscopy,biopsy N/A 2016    Procedure: COLONOSCOPY, POSSIBLE BIOPSY, POSSIBLE POLYPECTOMY 74933;  Surgeon: Fortino Maier MD;  Location: [facility]    Colonoscopy,remv lesn,snare N/A 2016    Procedure: COLONOSCOPY, POSSIBLE BIOPSY, POSSIBLE POLYPECTOMY 09828;  Surgeon: Fortino Maier MD;  Location: [facility]    Create eardrum opening,gen anesth  10/07/2021    Knee arthroscopy      Lap gastric bypass/hernando-en-y      Other surgical history      Rotator Cuff Repair left side    Other surgical history Right     meniscus       FAMILY HX:  Family History   Problem Relation Age of Onset    Other (COPD [Other]) Father     Heart Attack Father     Stroke Father     Breast Cancer Mother     Diabetes Mother        ALLERGIES:  Patient has no known allergies.    MEDICATIONS:   No current outpatient medications on file.       ROS: A comprehensive 14 point review of systems was performed and was negative aside from the aforementioned per history of present illness.    SOCIAL HX:  Social History     Tobacco Use    Smoking status: Former     Current packs/day: 0.00     Types: Cigarettes     Quit date: 8/10/2018     Years since quittin.8    Smokeless tobacco: Never   Substance Use Topics    Alcohol use: Yes     Comment: once per month       PE:   Vitals:    24 0130 24 0200 24 0236 24 0540   BP: 124/80 137/90 148/83 123/88   BP Location:   Left arm Left arm   Pulse: 87 92 88 88   Resp: 16 16 20 18   Temp:   98.3 °F (36.8 °C) 97.8 °F (36.6 °C)   TempSrc:   Oral Oral   SpO2: 98% 98% 94% 97%   Weight:   260 lb (117.9 kg)      Estimated body mass index is 40.72 kg/m² as calculated from the following:    Height as of an earlier encounter on 24:  5' 7\" (1.702 m).    Weight as of this encounter: 260 lb (117.9 kg).    Physical Exam  Constitutional:       Appearance: Normal appearance.   HENT:      Head: Normocephalic and atraumatic.   Eyes:      Extraocular Movements: Extraocular movements intact.   Cardiovascular:      Pulses: Normal pulses. Skin warm and well perfused.  Pulmonary:      Effort: Pulmonary effort is normal. No respiratory distress.   Skin:     General: Skin is warm.   Psychiatric:         Mood and Affect: Mood normal.     Neuro Exam:    Spluring: negative  Straight leg raise test: negative    Khan's: negative  IRR: negative  Sustained clonus: negative     and release: normal  Rhomberg: normal    UE Strength: 5/5 D Bi Tri WE FF FA  UE Sensation: decreased in right S1 distribution, otherwise normal in C1-T1 distribution  UE reflexes: normal    LE Strength: 5/5 IP Quad TA EHL GSC  LE Sensation: normal in L2-S1 distribution  LE reflexes: normal    Radiographic Examination/Diagnostics:  MRI personally viewed, independently interpreted and radiology report was reviewed.  Right paracentral disc herniation at L5-S1      IMPRESSION: Valerio Felipe is a 53 year old male with lumbar disc herniation    PLAN:   - Multimodal pain control  - Early ambulation w/ PT  - Discussed operative and non operative options, continue conservative management   - Contacted Dr. Reed for GINA    Mark Rebolleod MD  Orthopedic Spine Surgeon  [facility]   [address] [place] [email or web address]  [email or web address]  t: [phone]   f: [phone]        This note was dictated using Dragon software.  While it was briefly proofread prior to completion, some grammatical, spelling, and word choice errors due to dictation may still occur.

## 2024-06-05 NOTE — PHYSICAL THERAPY NOTE
Order received for PT eval and chart reviewed. Attempted to see Pt this am, however, Pt declining OOB mobility due to pain. Plan for GINA tomorrow. PT will continue to follow.

## 2024-06-05 NOTE — PROGRESS NOTES
Admitted with lower back pain that radiated down to his right lower back leg, verbalized has some occasional tingling sensation to right foot.   Rated pain at 10 this morning, Dilaudid IV and Norco given.  Verbaized pain relief of 3 an hour after taking pain medication. Patient was scheduled for GINA with Dr. Reed as OP on 6/19/24, but patient was unable to have pain control at home he came to the ER.  Dr. Reed seen and examined patient this morning, he plan for patient to have GINA tomorrow.  Will have patient NPO after midnight and hold heparin subcutaneous.

## 2024-06-05 NOTE — ED QUICK NOTES
Orders for admission, patient is aware of plan and ready to go upstairs. Any questions, please call ED RN GENEVA at extension [phone].     Patient Covid vaccination status: Fully vaccinated     COVID Test Ordered in ED: None    COVID Suspicion at Admission: N/A    Running Infusions:      Mental Status/LOC at time of transport: ALERT X 4.    Other pertinent information: CONSTANT PAIN WITH MINIMAL RELIEF FROM IV NARCOTICS. SPINE SURGEON WILL SEE PT IN AM  CIWA score: N/A   NIH score:  N/A

## 2024-06-05 NOTE — PLAN OF CARE
Problem: PAIN - ADULT  Goal: Verbalizes/displays adequate comfort level or patient's stated pain goal  Description: INTERVENTIONS:  - Encourage pt to monitor pain and request assistance  - Assess pain using appropriate pain scale  - Administer analgesics based on type and severity of pain and evaluate response  - Implement non-pharmacological measures as appropriate and evaluate response  - Consider cultural and social influences on pain and pain management  - Manage/alleviate anxiety  - Utilize distraction and/or relaxation techniques  - Monitor for opioid side effects  - Notify MD/LIP if interventions unsuccessful or patient reports new pain  - Anticipate increased pain with activity and pre-medicate as appropriate  Outcome: Progressing     Problem: SAFETY ADULT - FALL  Goal: Free from fall injury  Description: INTERVENTIONS:  - Assess pt frequently for physical needs  - Identify cognitive and physical deficits and behaviors that affect risk of falls.  - [place] fall precautions as indicated by assessment.  - Educate pt/family on patient safety including physical limitations  - Instruct pt to call for assistance with activity based on assessment  - Modify environment to reduce risk of injury  - Provide assistive devices as appropriate  - Consider OT/PT consult to assist with strengthening/mobility  - Encourage toileting schedule  Outcome: Progressing     Plan for GINA tomorrow  at  1130 with Dr. Reed.  Instructed patient to be NPO after midnight.

## 2024-06-05 NOTE — H&P
[facility]     Valerio Felipe Patient Status:  Emergency    1970 MRN [id number]   Location [facility] EMERGENCY DEPARTMENT Attending Demetrio Poe DO   Hosp Day # 0 PCP Ayse Luo DO     Chief Complaint: Back Pain    Subjective:    History of Present Illness:     Valerio Felipe is a 53 year old male with past medical history of hypertension presents to the emergency department with low back pain    Patient has low back pain with burning shooting tingling sensation into his right lower extremity.  He has outpatient follow-up scheduled with an orthospine doctor.  Due to intractable symptoms and inability to find comfortable position he presented to the emergency department.  No chest pain or difficulty breathing.  Recently had diverticulitis but abdominal pain has resolved.  No other complaints at this time.      History/Other:    Past Medical History:  Past Medical History:    Essential hypertension    High blood pressure    High cholesterol    Leukemia (HCC)    Leukemia (HCC)    Personal history of antineoplastic chemotherapy    oral chemo (Sprycel)    Smoking     Past Surgical History:   Past Surgical History:   Procedure Laterality Date    Colonoscopy  2016    diverticulosis, polyps (adenoma/hp). repeat 5 years    Colonoscopy N/A 2021    Procedure: COLONOSCOPY;  Surgeon: Fortino Maier MD;  Location:  ENDOSCOPY    Colonoscopy,biopsy N/A 2016    Procedure: COLONOSCOPY, POSSIBLE BIOPSY, POSSIBLE POLYPECTOMY 55944;  Surgeon: Fortino Maier MD;  Location: [facility]    Colonoscopy,remv lesn,snare N/A 2016    Procedure: COLONOSCOPY, POSSIBLE BIOPSY, POSSIBLE POLYPECTOMY 84096;  Surgeon: Fortino Maier MD;  Location: [facility]    Create eardrum opening,gen anesth  10/07/2021    Knee arthroscopy      Lap gastric bypass/hernando-en-y      Other surgical history      Rotator Cuff Repair left side    Other surgical history Right      meniscus      Family History:   Family History   Problem Relation Age of Onset    Other (COPD [Other]) Father     Heart Attack Father     Stroke Father     Breast Cancer Mother     Diabetes Mother      Social History:    reports that he quit smoking about 5 years ago. His smoking use included cigarettes. He has never used smokeless tobacco. He reports current alcohol use. He reports current drug use. Drug: Cannabis.     Allergies: No Known Allergies    Medications:    Current Facility-Administered Medications on File Prior to Encounter   Medication Dose Route Frequency Provider Last Rate Last Admin    [COMPLETED] diazepam (Valium) 5 mg/mL injection 5 mg  5 mg Intravenous Once Yobany Soto DO   5 mg at 05/26/24 1107    [COMPLETED] morphINE PF 4 MG/ML injection 4 mg  4 mg Intravenous Once Yobany Soto DO   4 mg at 05/26/24 1108    [COMPLETED] ketorolac (Toradol) 15 MG/ML injection 15 mg  15 mg Intravenous Once Yobany Soto DO   15 mg at 05/26/24 1108    [COMPLETED] dexAMETHasone PF (Decadron) 10 MG/ML injection 10 mg  10 mg Intravenous Once Yobany Soto DO   10 mg at 05/26/24 1108    [COMPLETED] sodium chloride 0.9 % IV bolus 1,000 mL  1,000 mL Intravenous Once Yobany Soto DO   Stopped at 05/26/24 1324    [COMPLETED] HYDROmorphone (Dilaudid) 1 MG/ML injection 1 mg  1 mg Intravenous Once Yobany Soto DO   1 mg at 05/26/24 1227    [COMPLETED] sodium chloride 0.9 % IV bolus 1,000 mL  1,000 mL Intravenous Once Ariadna Houston MD   Stopped at 05/23/24 1928    [COMPLETED] iopamidol 76% (ISOVUE-370) injection for power injector  100 mL Intravenous ONCE PRN Ariadna Houston MD   100 mL at 05/23/24 1907    [COMPLETED] morphINE PF 4 MG/ML injection 4 mg  4 mg Intravenous Once Rain Padron DO   4 mg at 05/17/24 0808    [COMPLETED] sodium chloride 0.9 % IV bolus 1,000 mL  1,000 mL Intravenous Once Rain Padron DO 1,000 mL/hr at 05/17/24 0809 1,000 mL at 05/17/24 0809     [COMPLETED] HYDROmorphone (Dilaudid) 1 MG/ML injection 1 mg  1 mg Intravenous Once Rain Padron DO   1 mg at 24 0850    [COMPLETED] iopamidol 76% (ISOVUE-370) injection for power injector  100 mL Intravenous ONCE PRN Rain Padron DO   100 mL at 24 0855    [COMPLETED] cefTRIAXone (Rocephin) 2 g in D5W 100 mL IVPB-ADD  2 g Intravenous Once Rain Padron DO   Stopped at 24 1100    [COMPLETED] metRONIDAZOLE in sodium chloride 0.79% (Flagyl) 5 mg/mL IVPB premix 500 mg  500 mg Intravenous Once Rain Padron  mL/hr at 24 1101 500 mg at 24 1101    [] sodium chloride 0.9% infusion   Intravenous Continuous Rain Padron DO        [COMPLETED] metoclopramide (Reglan) tab 10 mg  10 mg Oral Once Pat Altman MD   10 mg at 24 0758    [COMPLETED] famotidine (Pepcid) tab 20 mg  20 mg Oral Once Pat Altman MD   20 mg at 24 0758    [COMPLETED] ceFAZolin (Ancef) 2 g in D5W 50 mL Duplex container  2 g Intravenous Once Pat Altman MD   3 g at 24 0915     Current Outpatient Medications on File Prior to Encounter   Medication Sig Dispense Refill    HYDROcodone-acetaminophen (NORCO)  MG Oral Tab Take 1 tablet by mouth every 6 (six) hours as needed for Pain. 40 tablet 0    [] ibuprofen 600 MG Oral Tab Take 1 tablet (600 mg total) by mouth every 8 (eight) hours as needed for Pain or Fever. 30 tablet 0    amphetamine-dextroamphetamine (ADDERALL) 20 MG Oral Tab Take 1 tablet (20 mg total) by mouth 2 (two) times daily. 60 tablet 0    diclofenac 75 MG Oral Tab EC Take 1 tablet (75 mg total) by mouth 2 (two) times daily as needed. 60 tablet 1    losartan 100 MG Oral Tab Take 1 tablet (100 mg total) by mouth daily. 90 tablet 0    spironolactone 50 MG Oral Tab Take 1 tablet (50 mg total) by mouth daily. 90 tablet 0    atorvastatin 10 MG Oral Tab Take 1 tablet (10 mg total) by mouth daily. 90 tablet 0    Dasatinib (SPRYCEL) 100 MG Oral Tab Take 1 tablet by mouth  daily.  0    Labetalol HCl 200 MG Oral Tab Take 1 tablet (200 mg total) by mouth 2 (two) times daily. 60 tablet 3    Vitamin B-12 1000 MCG Oral Tab Take 1 tablet (1,000 mcg total) by mouth daily.      Cholecalciferol (VITAMIN D3) 1.25 MG (48595 UT) Oral Cap Take 1 tablet by mouth once a week.      Ascorbic Acid (VITAMIN C) 1000 MG Oral Tab Take 1 tablet (1,000 mg total) by mouth daily.         Review of Systems:   A comprehensive review of systems was completed.    Pertinent positives and negatives noted in the HPI.    Objective:   Physical Exam:    /80   Pulse 87   Temp 97.2 °F (36.2 °C) (Temporal)   Resp 16   Wt 284 lb 6.3 oz (129 kg)   SpO2 98%   BMI 44.54 kg/m²   General: No acute distress, Alert  Respiratory: No rhonchi, no wheezes  Cardiovascular: S1, S2. Regular rate and rhythm  Abdomen: Soft, Non-tender, non-distended, positive bowel sounds  Neuro: No new focal deficits  Extremities: No edema      Results:    Labs:      Labs Last 24 Hours:    Recent Labs   Lab 06/04/24  2207   RBC 3.56*   HGB 11.2*   HCT 33.4*   MCV 93.8   MCH 31.5   MCHC 33.5   RDW 13.3   NEPRELIM 8.31*   WBC 12.3*   .0       Recent Labs   Lab 06/04/24  2208   *   BUN 26*   CREATSERUM 1.55*   EGFRCR 53*   CA 8.9      K 4.3      CO2 25.0       Lab Results   Component Value Date    INR 1.02 07/27/2022       No results for input(s): \"TROP\", \"TROPHS\", \"CK\" in the last 168 hours.    No results for input(s): \"TROP\", \"PBNP\" in the last 168 hours.    No results for input(s): \"PCT\" in the last 168 hours.    Imaging: Imaging data reviewed in Epic.    Assessment & Plan:      #Intractable Lumbar radiculopathy  -pain control, PRN opioids - limit as able  -can consider trial of Decadron   -Robaxin   -Ortho spine to see  -PT/OT    #Acute Kidney Injury  -IVF    #Essential hypertension  -Continue Labetalol  -Hold Losartan/Aldactone      #Hyperlipidemia  -Continue home statin     #CML  -primary oncologist at outside  hospital   -PTA: Dasatinib     #History of gastric bypass surgery     #Morbid obesity  -BMI 40.72        Plan of care discussed with Dr. Lui Loyola DO    Supplementary Documentation:     The 21st Century Cures Act makes medical notes like these available to patients in the interest of transparency. Please be advised this is a medical document. Medical documents are intended to carry relevant information, facts as evident, and the clinical opinion of the practitioner. The medical note is intended as peer to peer communication and may appear blunt or direct. It is written in medical language and may contain abbreviations or verbiage that are unfamiliar.

## 2024-06-05 NOTE — OCCUPATIONAL THERAPY NOTE
OT orders received, chart reviewed. Patient declining therapy at this time due to pain. Plan for GINA tomorrow. Will reattempt as able.

## 2024-06-06 ENCOUNTER — APPOINTMENT (OUTPATIENT)
Dept: GENERAL RADIOLOGY | Facility: HOSPITAL | Age: 54
DRG: 552 | End: 2024-06-06
Attending: ANESTHESIOLOGY
Payer: COMMERCIAL

## 2024-06-06 LAB
ANION GAP SERPL CALC-SCNC: 6 MMOL/L (ref 0–18)
BUN BLD-MCNC: 17 MG/DL (ref 9–23)
CALCIUM BLD-MCNC: 8.9 MG/DL (ref 8.5–10.1)
CHLORIDE SERPL-SCNC: 109 MMOL/L (ref 98–112)
CO2 SERPL-SCNC: 25 MMOL/L (ref 21–32)
CREAT BLD-MCNC: 0.94 MG/DL
EGFRCR SERPLBLD CKD-EPI 2021: 96 ML/MIN/1.73M2 (ref 60–?)
GLUCOSE BLD-MCNC: 106 MG/DL (ref 70–99)
OSMOLALITY SERPL CALC.SUM OF ELEC: 292 MOSM/KG (ref 275–295)
POTASSIUM SERPL-SCNC: 4.5 MMOL/L (ref 3.5–5.1)
SODIUM SERPL-SCNC: 140 MMOL/L (ref 136–145)

## 2024-06-06 PROCEDURE — 99232 SBSQ HOSP IP/OBS MODERATE 35: CPT | Performed by: ANESTHESIOLOGY

## 2024-06-06 PROCEDURE — 64483 NJX AA&/STRD TFRM EPI L/S 1: CPT | Performed by: ANESTHESIOLOGY

## 2024-06-06 PROCEDURE — 3E0R3KZ INTRODUCTION OF OTHER DIAGNOSTIC SUBSTANCE INTO SPINAL CANAL, PERCUTANEOUS APPROACH: ICD-10-PCS | Performed by: ANESTHESIOLOGY

## 2024-06-06 PROCEDURE — 99232 SBSQ HOSP IP/OBS MODERATE 35: CPT | Performed by: STUDENT IN AN ORGANIZED HEALTH CARE EDUCATION/TRAINING PROGRAM

## 2024-06-06 PROCEDURE — 3E0R33Z INTRODUCTION OF ANTI-INFLAMMATORY INTO SPINAL CANAL, PERCUTANEOUS APPROACH: ICD-10-PCS | Performed by: ANESTHESIOLOGY

## 2024-06-06 RX ORDER — DOCUSATE SODIUM 100 MG/1
100 CAPSULE, LIQUID FILLED ORAL 2 TIMES DAILY
Status: DISCONTINUED | OUTPATIENT
Start: 2024-06-06 | End: 2024-06-10

## 2024-06-06 RX ORDER — MIDAZOLAM HYDROCHLORIDE 1 MG/ML
INJECTION INTRAMUSCULAR; INTRAVENOUS
Status: DISCONTINUED | OUTPATIENT
Start: 2024-06-06 | End: 2024-06-06 | Stop reason: HOSPADM

## 2024-06-06 RX ORDER — HYDROCODONE BITARTRATE AND ACETAMINOPHEN 5; 325 MG/1; MG/1
1 TABLET ORAL EVERY 8 HOURS PRN
Qty: 12 TABLET | Refills: 0 | Status: SHIPPED | OUTPATIENT
Start: 2024-06-06 | End: 2024-06-06

## 2024-06-06 RX ORDER — DEXAMETHASONE SODIUM PHOSPHATE 10 MG/ML
INJECTION, SOLUTION INTRAMUSCULAR; INTRAVENOUS
Status: DISCONTINUED | OUTPATIENT
Start: 2024-06-06 | End: 2024-06-06 | Stop reason: HOSPADM

## 2024-06-06 RX ORDER — SODIUM CHLORIDE, SODIUM LACTATE, POTASSIUM CHLORIDE, CALCIUM CHLORIDE 600; 310; 30; 20 MG/100ML; MG/100ML; MG/100ML; MG/100ML
100 INJECTION, SOLUTION INTRAVENOUS CONTINUOUS
Status: DISCONTINUED | OUTPATIENT
Start: 2024-06-06 | End: 2024-06-08

## 2024-06-06 RX ORDER — SODIUM CHLORIDE 9 MG/ML
INJECTION, SOLUTION INTRAMUSCULAR; INTRAVENOUS; SUBCUTANEOUS
Status: DISCONTINUED | OUTPATIENT
Start: 2024-06-06 | End: 2024-06-06 | Stop reason: HOSPADM

## 2024-06-06 RX ORDER — DIPHENHYDRAMINE HYDROCHLORIDE 50 MG/ML
50 INJECTION INTRAMUSCULAR; INTRAVENOUS ONCE AS NEEDED
Status: ACTIVE | OUTPATIENT
Start: 2024-06-06 | End: 2024-06-06

## 2024-06-06 RX ORDER — ONDANSETRON 2 MG/ML
4 INJECTION INTRAMUSCULAR; INTRAVENOUS ONCE AS NEEDED
Status: ACTIVE | OUTPATIENT
Start: 2024-06-06 | End: 2024-06-06

## 2024-06-06 RX ORDER — ONDANSETRON 2 MG/ML
4 INJECTION INTRAMUSCULAR; INTRAVENOUS ONCE AS NEEDED
Status: DISCONTINUED | OUTPATIENT
Start: 2024-06-06 | End: 2024-06-06 | Stop reason: HOSPADM

## 2024-06-06 RX ORDER — BISACODYL 10 MG
10 SUPPOSITORY, RECTAL RECTAL
Status: DISCONTINUED | OUTPATIENT
Start: 2024-06-06 | End: 2024-06-10

## 2024-06-06 RX ORDER — LIDOCAINE HYDROCHLORIDE 10 MG/ML
INJECTION, SOLUTION EPIDURAL; INFILTRATION; INTRACAUDAL; PERINEURAL
Status: DISCONTINUED | OUTPATIENT
Start: 2024-06-06 | End: 2024-06-06 | Stop reason: HOSPADM

## 2024-06-06 NOTE — PROGRESS NOTES
[facility]   part of [facility]    Progress Note    Valerio Felipe Patient Status:  Inpatient    1970 MRN [id number]   Location [facility] 3SW-A Attending Tiffani Lynn MD   Hosp Day # 1 PCP Ayse Luo DO     Chief Complaint: Lumbar disc herniation    Subjective:   Subjective: Still having radicular pain. Pending GINA    Objective:   Blood pressure 145/74, pulse 86, temperature 98.1 °F (36.7 °C), temperature source Oral, resp. rate 17, weight 260 lb (117.9 kg), SpO2 91%.  Physical Exam  Neurologic Exam    Physical Exam  Constitutional:       Appearance: Normal appearance.   HENT:      Head: Normocephalic and atraumatic.   Eyes:      Extraocular Movements: Extraocular movements intact.   Cardiovascular:      Pulses: Normal pulses. Skin warm and well perfused.  Pulmonary:      Effort: Pulmonary effort is normal. No respiratory distress.   Skin:     General: Skin is warm.   Psychiatric:         Mood and Affect: Mood normal.     Spine Exam:  LE Strength: 5/5 IP Quad TA EHL GSC  LE Sensation: normal in L2-S1 distribution  LE reflexes: normal      Results:   Lab Results   Component Value Date    WBC 11.2 (H) 2024    HGB 10.1 (L) 2024    HCT 31.7 (L) 2024    .0 2024    CREATSERUM 0.94 2024    BUN 17 2024     2024    K 4.5 2024     2024    CO2 25.0 2024     (H) 2024    CA 8.9 2024    ALB 3.4 2024    ALKPHO 73 2024    BILT 0.3 2024    TP 7.3 2024    AST 58 (H) 2024    ALT 86 (H) 2024    PTT 29.7 2022    INR 1.02 2022    T4F 1.0 2023    TSH 1.040 2023    LIP 25 2024    PSA 0.85 2023    DDIMER 0.87 (H) 2024    ESRPF 10 2013    CRP 0.81 2013    MG 1.9 2024    TROP <0.045 2021    B12 741 2023    POCGLU 107 (H) 2014       MRI SPINE LUMBAR (CPT=72148)    Result Date: 2024  CONCLUSION:    Multilevel degenerative changes of the spine, as detailed above.  This is most notable at L5-S1 where there is a combination of diffuse disc bulge and superimposed right paracentral focal disc extrusion, increased in size compared to 2012.  This results in moderate central canal stenosis, additionally with impingement of the descending right S1 nerve root.  Facet arthrosis contributes to severe narrowing of the right neural foramen.  Correlate with radicular symptoms.   LOCATION:  [facility]   Dictated by (CST): Kayleigh De Anda MD on 6/04/2024 at 11:59 PM     Finalized by (CST): Kayleigh De Anda MD on 6/05/2024 at 0:06 AM            Assessment & Plan:   53M w/ L5-S1 disc hernation  - Multimodal pain regimen  - PT  - GINA  - Follow up in clinic 1-2 weeks after discharge to review symptoms    Mark Rebolledo MD  6/6/2024                   ambulatory

## 2024-06-06 NOTE — PLAN OF CARE
Pt A&O. On room air. Encouraged ankle pump exercises every hour while awake. Tolerating diet with good appetite. Last BM 6/3/24. Voiding w/o difficultly. Pain managed with current medications. Pt reminded to \"call; don't fall\". Bed alarm on. Participating in therapy as ordered. Up with standby assist using walker and gait belt. Bandaid to injection site C/D/I. Pt plans to be dc'd home when cleared, most likely tomorrow. Pt's spouse, Mary Jo, and daughter, Cheyanne, at bedside and updated with plan of care.

## 2024-06-06 NOTE — DISCHARGE INSTRUCTIONS
+++ Patient with home med in bin +++    You have been given medication that makes you sleepy. This may have included both pain medication and sleeping medication. Most of the effects have worn off but you may still have some drowsiness for the next 6 to 8 hours.    Home Care  Follow these guidelines when you get home:    --Don't drink any alcohol for the next 24 hours.    --Don't drive, operate dangerous machinery, make important business or personal    decisions, or sign legal documents during the next 24 hours.    Follow Up Care  Follow up with your healthcare provider if you are not alert and back to your usual level of activity within 12 hours    When to seek medical advice  Call your healthcare provider right away if any of these occur:    --Drowsiness that gets worse  --Weakness or dizziness that gets worse  --Repeated vomiting  --You can not be awakened   Home Care Instructions Following Your Pain Procedure     Valerio,  It has been a pleasure to have you as our patient. To help you at home, you must follow these general discharge instructions. We will review these with you before you are discharged. It is our hope that you have a complete and uneventful recovery from our procedure.     General Instructions:  What to Expect:  Bandages from your procedure today can be removed when you get home.  Please avoid soaking and/or swimming for 24 hours.  Showering is okay  It is normal to have increased pain symptoms and/or pain at injection site for up to 3-5 days after procedure, you can use heat or ice (20 minutes on 20 minutes off) for comfort.  You may experience some temporary side effects which may include restlessness or insomnia, flushing of the face, or heart palpitations.  Please contact the provider if these symptoms do not resolve within 3-4 days.  Lightheadedness or nausea may occur and should resolve within 24 to 48 hours.  If you develop a headache after treatment, rest, drink fluids (with caffeine, if  possible) and take mild over-the-counter pain medication.  If the headache does not improve with the above treatment, contact the physician.  Home Medications:  Resume all previously prescribed medication.  Please avoid taking NSAIDs (Non-Steriodal Anti-Inflammatory Drugs) such as:  Ibuprofen ( Advil, Motrin) Aleve (Naproxen), Diclofenac, Meloxicam for 6 hours after procedure.   If you are on Coumadin (Warfarin) or any other anti-coagulant (or \"blood thinning\") medication such as Plavix (Clopidogrel), Xarelto (Rivaroxaban), Eliquis (Apixaban), Effient (Prasugrel) etc., restart on the following day from the procedure unless otherwise directed by your provider.  If you are a diabetic, please increase the frequency of your glucose monitoring after the procedure as steroids may cause a temporary (2-3 day) increase in your blood sugar.  Contact your primary care physician if your blood sugar remains elevated as you may require some medication adjustment.  Diet:  Resume your regular diet as tolerated.  Activity:  We recommend that you relax and rest during the rest of your procedure day.  If you feel weakness in your arms or legs do not drive.  Follow-up Appointment  Please schedule a follow-up visit within 3 to 4 weeks after your last procedure date.  Question or Concerns:  Feel free to call our office with any questions or concerns at [phone] (option #2)    Valerio  Thank you for coming to [facility] for your procedure.  The nurses try very hard to make sure you receive the best care possible.  Your trust in them as well as us is greatly appreciated.    Thanks so much,   Dr. Castro Reed

## 2024-06-06 NOTE — PHYSICAL THERAPY NOTE
PHYSICAL THERAPY EVALUATION - INPATIENT     Room Number: 365/365-A  Evaluation Date: 2024  Type of Evaluation: Initial  Physician Order: PT Eval and Treat    Presenting Problem: back pain, L5-S1 disc herniation s/p GINA 24  Co-Morbidities : CML, HTN, diverticulitis  Reason for Therapy: Mobility Dysfunction and Discharge Planning    PHYSICAL THERAPY ASSESSMENT   Patient is a 53 year old male admitted 2024 for back pain. Imaging revealing for L5-S1 disc herniation s/p GINA 24.   Patient is currently functioning at baseline with bed mobility, transfers, gait, and stair negotiation. Prior to admission, patient's baseline is MOD I.     PLAN  Patient has been evaluated and presents with no skilled Physical Therapy needs at this time.  Patient discharged from Physical Therapy services.  Please re-order if a new functional limitation presents during this admission.    GOALS  Patient was able to achieve the following goals ...    Patient was able to transfer Safely and independently   Patient able to ambulate on level surfaces Safely and independently       HOME SITUATION  Type of Home: Apartment   Home Layout: Two level                Lives With: Spouse  Drives: Yes          Prior Level of [place]: Pt lives with spouse and family in 2nd floor apartment. Pt independent with ADL and mobility. Pt does not use RW or cane. Pt reports difficulty with mobility for the past 3 weeks due to back pain. 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     SUBJECTIVE  \"The pain is much better but I still feel it in my calf.\"       OBJECTIVE  Precautions: Spine  Fall Risk: Standard fall risk    WEIGHT BEARING RESTRICTION  Weight Bearing Restriction: None                PAIN ASSESSMENT  Ratin  Location: back  Management Techniques: Activity promotion;Repositioning    COGNITION  Overall Cognitive Status:  WFL - within functional limits    RANGE OF MOTION AND STRENGTH ASSESSMENT  Upper extremity ROM and  strength are within functional limits     Lower extremity ROM is within functional limits     Lower extremity strength is within functional limits       BALANCE  Static Sitting: Good  Dynamic Sitting: Good  Static Standing: Good  Dynamic Standing: Good    ADDITIONAL TESTS                                    ACTIVITY TOLERANCE                         O2 WALK       NEUROLOGICAL FINDINGS                        AM-PAC '6-Clicks' INPATIENT SHORT FORM - BASIC MOBILITY  How much difficulty does the patient currently have...  Patient Difficulty: Turning over in bed (including adjusting bedclothes, sheets and blankets)?: None   Patient Difficulty: Sitting down on and standing up from a chair with arms (e.g., wheelchair, bedside commode, etc.): None   Patient Difficulty: Moving from lying on back to sitting on the side of the bed?: None   How much help from another person does the patient currently need...   Help from Another: Moving to and from a bed to a chair (including a wheelchair)?: None   Help from Another: Need to walk in hospital room?: None   Help from Another: Climbing 3-5 steps with a railing?: None       AM-PAC Score:  Raw Score: 24   Approx Degree of Impairment: 0%   Standardized Score (AM-PAC Scale): 61.14   CMS Modifier (G-Code): CH    FUNCTIONAL ABILITY STATUS  Gait Assessment   Functional Mobility/Gait Assessment  Gait Assistance: Modified independent  Distance (ft): 200  Assistive Device: None  Stairs: Stairs  How Many Stairs: 4 x 2  Device: 1 Rail  Assist: Modified independent    Skilled Therapy Provided     Bed Mobility:  Rolling: I  Supine to sit: I   Sit to supine: I     Transfer Mobility:  Sit to stand: I   Stand to sit: I  Gait = MOD I    Therapist's comments: Reviewed spine precautions, log roll technique, and activity recommendations. Pt able to perform bed mobility, don shoes, perform transfers, ambulate without assistive device, and perform stair negotiation safely at I/MOD I level. Pt demonstrates  good pacing and safety awareness.     Exercise/Education Provided:  Bed mobility  Energy conservation  Functional activity tolerated  Gait training  Posture  Strengthening  Transfer training    Patient End of Session: Up in chair;Needs met;Call light within reach;RN aware of session/findings;All patient questions and concerns addressed    Patient Evaluation Complexity Level:  History High - 3 or more personal factors and/or co-morbidities   Examination of body systems Low - addressing 1-2 elements   Clinical Presentation Low - Stable   Clinical Decision Making Low Complexity       PT Session Time: 20 minutes  Gait Training:  minutes  Therapeutic Activity:  10 minutes  Neuromuscular Re-education:  minutes  Therapeutic Exercise:  minutes

## 2024-06-06 NOTE — PAYOR COMM NOTE
--------------  ADMISSION REVIEW     Payor: [facility] CORE/NAVIGATE  Subscriber #:  [id number]  Authorization Number: [id number]    Admit date: 6/5/24  Admit time:  2:24 AM       REVIEW DOCUMENTATION:     ED Provider Notes        ED Provider Notes signed by Demetrio Poe DO at 6/5/2024 12:59 AM       Author: Demetrio Poe DO Service: -- Author Type: Physician    Filed: 6/5/2024 12:59 AM Date of Service: 6/4/2024 10:09 PM Status: Signed    : Demetrio Poe DO (Physician)           Patient Seen in: [facility] Emergency Department      History     Chief Complaint   Patient presents with    Back Pain     Stated Complaint: sciatic pain, hx of    Subjective:   HPI    53-year-old male presents to emergency room with chief complaint of low back pain.  Patient reports symptoms have been present over the last month or so, states pain starts in the right lower back and will travel to the calf.  Feels a burning sensation, states he does feel some weakness from the pain, denies loss of bowel or bladder control.  Denies symptoms in the left lower extremity.  Patient has seen his primary care physician for the symptoms, followed up with spine Dr Rebolledo who evaluated the patient, I reviewed clinic visit, x-rays were performed at that time and patient was noted to have degenerative disc disease at L5-S1, MRI was recommended and patient was also referred to pain clinic.  Patient reports he recently finished a course of steroids, has been using Norco as well as muscle relaxants, the last few days his pain has been increasing, feels that he cannot sleep or do any of his daily activities.  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               No pertinent social history.            Review of Systems    Positive for stated complaint: sciatic pain, hx of  Other systems are as noted in HPI.  Constitutional and vital signs reviewed.      All other systems reviewed and negative except as noted above.    Physical Exam     ED Triage Vitals [06/04/24 2038]   /85   Pulse 101   Resp 16   Temp 97.2 °F (36.2 °C)   Temp src Temporal   SpO2 95 %   O2 Device None (Room air)       Current Vitals:   Vital Signs  BP: 133/86  Pulse: 95  Resp: 16  Temp: 97.2 °F (36.2 °C)  Temp src: Temporal  MAP (mmHg): (!) 61    Oxygen Therapy  SpO2: 98 %  O2 Device: None (Room air)            Physical Exam    GENERAL: Patient is awake, alert,   HEENT:  no scleral icterus.  Mucous membranes are moist  Back: No midline thoracic or lumbar spine tenderness or step-off, there is tenderness to the right of the lower lumbar spine.  No rashes or erythema.  HEART: Regular rate and rhythm, no murmurs.  LUNGS: Clear to auscultation bilaterally.  No Rales, no rhonchi, no wheezing, no stridor.  ABDOMEN: Soft, nondistended,non tender,.. No CVA tenderness  EXTREMITIES: No peripheral edema, no calf tenderness.  NEUROLOGIC EXAM: Muscle strength 5/5 bilateral lower extremities, no foot drop bilaterally, dorsi and plantarflexion intact bilaterally.  No saddle anesthesia.  ED Course     Labs Reviewed   BASIC METABOLIC PANEL (8) - Abnormal; Notable for the following components:       Result Value    Glucose 111 (*)     BUN 26 (*)     Creatinine 1.55 (*)     eGFR-Cr 53 (*)     All other components within normal limits   CBC W/ DIFFERENTIAL - Abnormal; Notable for the following components:    WBC 12.3 (*)     RBC 3.56 (*)     HGB 11.2 (*)     HCT 33.4 (*)     Neutrophil Absolute Prelim 8.31 (*)     Neutrophil Absolute 8.31 (*)     Monocyte Absolute 1.72 (*)     Eosinophil Absolute 0.82 (*)     All other components within normal limits   CBC WITH DIFFERENTIAL WITH PLATELET    Narrative:     The  following orders were created for panel order CBC With Differential With Platelet.  Procedure                               Abnormality         Status                     ---------                               -----------         ------                     CBC W/ DIFFERENTIAL[[id number]]          Abnormal            Final result                 Please view results for these tests on the individual orders.   RAINBOW DRAW BLUE                     MDM        Differential diagnosis before testing includes but not limited to lumbar radiculopathy, sciatica, compression fracture, malignancy/lytic lesion/tumor, which is a medical condition that poses a threat to life/function    Past Medical History/comorbidities-CML      Radiographic images  I personally reviewed the radiographs and my individual interpretation shows MRI did not reveal any mass/tumor on my review  I also reviewed the official reports that showed multilevel degenerative changes, L5-S1 diffuse disc bulge and superimposed right paracentral focal disc extrusion, moderate central canal stenosis.  Impingement descending right S1 nerve root.    Discussion of management (consult/physicians, social work, pharmacy,ect) Dr. Loyola hospitalist    External chart review included records from patient's spine visit reviewed as noted in HPI    Medications Provided: Dilaudid    Course of Events during Emergency Room Visit include IV established blood work obtained.  CBC white count 12.3 hemoglobin 11.2 platelet 239.  Chemistry sodium 139 potassium 4.3 BUN 26 creatinine 1.55 glucose 111.  MRI was performed, discussed results with patient.  Patient received multiple doses of pain medication emergency room, reports he is still quite uncomfortable, will admit for further evaluation, discussed with hospitalist physician.  Patient agrees with plan.    Shared decision making was utilized         Discharge  I have discussed with the patient the results of test, differential  diagnosis, treatment plan, warning signs and symptoms which should prompt immediate return.  They expressed understanding of these instructions and agrees to the following plan provided.  They were given written discharge instructions and agrees to return for any concerns and voiced understanding and all questions were answered.    Note to patient: The 21st Century Cures Act makes medical notes like these available to patients in the interest of transparency. However, this is a medical document intended as peer to peer communication. It is written in medical language and may contain abbreviations or verbiage that are unfamiliar. It may appear blunt or direct. Medical documents are intended to carry relevant information, facts as evident, and the clinical opinion of the practitioner.           Admission disposition: 6/5/2024 12:54 AM                                        Medical Decision Making      Disposition and Plan     Clinical Impression:  1. Intractable back pain    2. Lumbar radiculopathy         Disposition:  Admit  6/5/2024 12:54 am    Follow-up:  No follow-up provider specified.        Medications Prescribed:  Current Discharge Medication List                            Hospital Problems       Present on Admission  Date Reviewed: 6/1/2024            ICD-10-CM Noted POA    * (Principal) Intractable back pain M54.9 6/5/2024 Unknown                     Signed by Demetrio Poe DO on 6/5/2024 12:59 AM         MEDICATIONS ADMINISTERED IN LAST 1 DAY:  atorvastatin (Lipitor) tab 10 mg       Date Action Dose Route User    6/6/2024 0823 Given 10 mg Oral Jerry Foster RN          HYDROcodone-acetaminophen (Norco) 5-325 MG per tab 2 tablet       Date Action Dose Route User    6/5/2024 2126 Given 2 tablet Oral Mina Parks RN    6/5/2024 1651 Given 2 tablet Oral Shannon Ortega RN    6/5/2024 1238 Given 2 tablet Oral Shannon Ortega RN          HYDROmorphone (Dilaudid) 1 MG/ML injection 0.8 mg        Date Action Dose Route User    6/6/2024 0356 Given 0.8 mg Intravenous Mina Parks RN    6/5/2024 2259 Given 0.8 mg Intravenous Mina Parks RN    6/5/2024 1651 Given 0.8 mg Intravenous Shannon Ortega RN    6/5/2024 1238 Given 0.8 mg Intravenous Shannon Ortega RN          labetalol (Normodyne) tab 200 mg       Date Action Dose Route User    6/6/2024 0823 Given 200 mg Oral Jerry Foster RN    6/5/2024 1803 Given 200 mg Oral Shannon Ortega RN          methocarbamol (Robaxin) 1000 MG/10ML injection 500 mg       Date Action Dose Route User    6/5/2024 1606 Given 500 mg Intravenous Shannon Ortega RN          methocarbamol (Robaxin) tab 500 mg       Date Action Dose Route User    6/6/2024 0823 Given 500 mg Oral Jerry Foster RN            Vitals (last day)       Date/Time Temp Pulse Resp BP SpO2 Weight O2 Device O2 Flow Rate (L/min) [facility]    06/06/24 0336 98 °F (36.7 °C) 89 18 132/80 97 % -- None (Room air) --     06/06/24 0028 98.4 °F (36.9 °C) 93 18 132/83 96 % -- None (Room air) --     06/05/24 2005 98.2 °F (36.8 °C) 84 18 125/77 -- -- -- --     06/05/24 1600 97 °F (36.1 °C) -- -- -- -- -- -- --     06/05/24 1550 97.6 °F (36.4 °C) 89 17 131/76 97 % -- None (Room air) 0 L/min     06/05/24 1155 98.1 °F (36.7 °C) 83 17 135/84 97 % -- None (Room air) 0 L/min     06/05/24 0720 97.9 °F (36.6 °C) 79 17 137/83 91 % -- None (Room air) 0 L/min     06/05/24 0540 97.8 °F (36.6 °C) 88 18 123/88 97 % -- None (Room air) --     06/05/24 0236 -- -- -- -- -- 260 lb -- --     06/05/24 0236 98.3 °F (36.8 °C) 88 20 148/83 94 % -- None (Room air) --     06/05/24 0200 -- 92 16 137/90 98 % -- None (Room air) --     06/05/24 0130 -- 87 16 124/80 98 % -- None (Room air) --     06/05/24 0009 -- 95 16 133/86 98 % -- None (Room air) --           CIWA Scores (since admission)       None            6/5 H&P       Subjective:  History of Present Illness:      Valerio Felipe is a 53 year  old male with past medical history of hypertension presents to the emergency department with low back pain     Patient has low back pain with burning shooting tingling sensation into his right lower extremity.  He has outpatient follow-up scheduled with an orthospine doctor.  Due to intractable symptoms and inability to find comfortable position he presented to the emergency department.  No chest pain or difficulty breathing.  Recently had diverticulitis but abdominal pain has resolved.  No other complaints at this time.         Assessment & Plan:  #Intractable Lumbar radiculopathy  -pain control, PRN opioids - limit as able  -can consider trial of Decadron   -Robaxin   -Ortho spine to see  -PT/OT     #Acute Kidney Injury  -IVF     #Essential hypertension  -Continue Labetalol  -Hold Losartan/Aldactone      #Hyperlipidemia  -Continue home statin     #CML  -primary oncologist at outside hospital   -PTA: Dasatinib     #History of gastric bypass surgery     #Morbid obesity  -BMI 40.72      6/5 ORTHO CONSULT NOTE    CC: back and right leg pain     HPI:    Valerio Felipe is a very pleasant 53 year old male who presents today for evaluation of back and leg pain. The distribution of symptoms are: 10% backpain and 90% leg pain. The symptoms began 3 week(s) ago without any significant injury. Since the onset, the symptoms have become slowly worse over time. Patient feels pain is aggravated by bending, sitting. The patient reports  numbness and  weakness.  The symptom characteristics are as follows: Patient is a 53-year-old male presenting with low back pain radiating down right lower extremity with worsening symptoms over the past 2 weeks.  Patient has been to the emergency department and seen primary care physician.  Patient has been prescribed various medications including oral steroids, anti-inflammatory, Norco.  Patient cannot perform activities of daily living due to severe diffuse symptoms.      Patient was seen in  clinic on 5/30 and MRI was ordered. Patient is also pending GINA. He returns to ED due to worsening of pain     Prior spine surgery: none.     Bowel and bladder symptoms: absent.     The patient has not had issues with balance and/or hand dexterity problems such as changes in penmanship or the use of buttons or zippers.     Treatment up to this time has included:     Evaluation: PCP and ED  NSAIDS: have been partially helpful  Narcotic use: Norco PRN  Physical therapy: None  Spinal injections: None  Others:     Vitals:     06/05/24 0130 06/05/24 0200 06/05/24 0236 06/05/24 0540   BP: 124/80 137/90 148/83 123/88   BP Location:     Left arm Left arm   Pulse: 87 92 88 88   Resp: 16 16 20 18   Temp:     98.3 °F (36.8 °C) 97.8 °F (36.6 °C)   TempSrc:     Oral Oral   SpO2: 98% 98% 94% 97%   Weight:     260 lb (117.9 kg)             Neuro Exam:     Spluring: negative  Straight leg raise test: negative     Khan's: negative  IRR: negative  Sustained clonus: negative      and release: normal  Rhomberg: normal     UE Strength: 5/5 D Bi Tri WE FF FA  UE Sensation: decreased in right S1 distribution, otherwise normal in C1-T1 distribution  UE reflexes: normal     LE Strength: 5/5 IP Quad TA EHL GSC  LE Sensation: normal in L2-S1 distribution  LE reflexes: normal     Radiographic Examination/Diagnostics:  MRI personally viewed, independently interpreted and radiology report was reviewed.  Right paracentral disc herniation at L5-S1        IMPRESSION: Valerio Felipe is a 53 year old male with lumbar disc herniation     PLAN:   - Multimodal pain control  - Early ambulation w/ PT  - Discussed operative and non operative options, continue conservative management   - Contacted Dr. Reed for GINA    6/5 PAIN SERVICE CONSULT NOTE     Chief complaint: Intractable low back pain     History of present illness:  Valerio Felipe is a 53 year old male with a past medical history of hypertension, high cholesterol, admitted due to  intractable low back pain.  The patient complains of 90% right leg pain.  Pain began approximately 3 weeks ago without any injury.  Pain symptoms have progressed over time.  Patient difficulty with ambulation and therefore was admitted.  Rates the pain as 9 out of 10.  Has failed oral steroids, anti-inflammatories, and hydrocodone.    Motor Examination:                                           (R)                                 (L)  Deltoid:                                                                5                                     5  Biceps:                                                                  5                                     5  Triceps:                                                                5                                     5  Wrist Extension:                                                  5                                     5  Wrist Flexsion:                                                    5                                     5  Finger Extension:                                                5                                     5  Finger Flexsion:                                                  5                                     5        Cardiovascular system: Regular rate and rhythm.    Respiratory system: Breath sounds equal bilaterally.    Abdomen: Soft, nontender,   Neurologic:  Cranial nerves II through XII are grossly intact.        Examination of the lower back:   Straight leg raise positive at 30 degrees on the right     Motor Examination:                              (R)                                 (L)               Hip Abduction:                          5                                     5               Hip Flexion:                               5                                     5               Knee Extension:                        5                                     5               Knee Flexion:                             5                                      5               Ant. Tibialis:                              5                                     5  Extensor Hallucis Longus:                    5                                     5  Peroneals:                                                5                                     5  Gastrocsoleus:                                         5                                     5        Radiology diagnostic studies:   Lumbar MRI reviewed with evidence of disc herniation at L5. there is impingement of the S1 nerve root     Assessment:  Intractable low back pain  Lumbar radiculopathy due to disc        Plan:      The patient is a pleasant 53-year-old gentleman with history of hypertension, high cholesterol admitted overnight due to intractable low back pain.  Patient complains of 3-week history of back pain with right-sided radicular features.  MRI with evidence of disc herniation with mass effect on S1 nerve root.  Therefore recommended trial of lumbar transforaminal epidural steroid injection.

## 2024-06-06 NOTE — PROGRESS NOTES
Pt sent to have his GINA. All belongings left at bedside. Underclothes removed. Pt voided prior to leaving unit.

## 2024-06-06 NOTE — OCCUPATIONAL THERAPY NOTE
OT orders received, chart reviewed. Per RN and PT, patient has been up ad hernandez. Spoke with patient at bedside, who reports no concerns regarding return to ADLs, reports familiar with spine precautions and just washed up and dressed himself independently in the bathroom. Family at bedside also report no concerns. No skilled OT needs at this time. Will sign off. Please reorder should need for skilled OT arise. RN aware and in agreement.

## 2024-06-06 NOTE — PLAN OF CARE
Alert and Oriented x4. On RA, CHRIS with no CPAP. VSS. Tele-NS. Severe Pain controlled by PRN medications, see MAR for actions. Denies N/T. Voiding freely. NPO. Denies N/V. Call light within reach at this time.    Plan: GINA today, NPO now, pain management

## 2024-06-06 NOTE — OPERATIVE REPORT
[facility]  Operative Report  2024     Valerio Felipe Patient Status:  Inpatient    1970 MRN [id number]   Location [facility] Attending Tiffani Lynn MD   Hosp Day # 1 PCP Ayse Luo DO     Indication: Valerio is a 53 year old male with lumbar radiculopathy and disc herniation    Preoperative Diagnosis:  Lumbar radiculopathy [M54.16]    Postoperative Diagnosis: Same as above.    Procedure performed: Right lumbar 5-sacral 1, sacral 1- 2 TRANSFORAMINAL   EPIDURAL STEROID INJECTION MULTIPLE LEVEL with sedation     Anesthesia: Local and IV Sedation.    EBL: Less than 1 ml.    Procedure Description:  After reviewing the patient's history and performing a focused physical examination, the diagnosis was confirmed and contraindications such as infection and coagulopathy were ruled out.  Following review of potential side effects and complications, including but not necessarily limited to infection, allergic reaction, local tissue breakdown, nerve injury, and paresis, the patient indicated they understood and agreed to proceed.  After obtaining the informed consent, the patient was brought to the procedure room and monitored.  Per my order and under my supervision, the patient was moderately sedated with intermittent intravenous doses of versed and fentanyl.  The vital signs including continuous pulse oximetry and cardiac monitoring were monitored and recorded by an experienced R.N.  The procedure started after the patient was adequately sedated. Total time  for sedation was 7 minutes.        In the prone position, following sterile prep and drape of the lumbar region,  the  L5 neural foramen was identified under fluoroscopy.  The skin and subcutaneous tissue was anesthetized via 25-gauge 1.5\" needle with approximately 2 cc of 1% lidocaine.  A 22-gauge 5\" Quincke spinal needle was introduced toward the inferior aspect of the junction between the transverse process and pedicle of  the  L5 level atraumatically under fluoroscopic guidance. The needle was advanced into the anterior epidural space at this level. The needle position was confirmed under AP and lateral fluoroscopic view.  Following negative aspiration for CSF and blood, approximately 1 cc of Omnipaque 240 was injected.  An excellent contrast spread along the epidural space and the nerve root was obtained.  At this point, 1cc of normal saline with 5 mg of dexamethasone was injected without complication.  The needle was withdrawn with stylet in situ after being flushed with 1 cc PF lidocaine.     The  S1 neural foramen was also identified under fluoroscopy.  The skin and subcutaneous tissue was anesthetized via 25-gauge 1.5\" needle with approximately 2 cc of 1% lidocaine.  A 22-gauge 5\" Quincke spinal needle was introduced toward the inferior aspect of the junction between the transverse process and pedicle of the S1 level atraumatically under fluoroscopic guidance. The needle was advanced into the anterior epidural space at this level. The needle position was confirmed under AP and lateral fluoroscopic view.  Following negative aspiration for CSF and blood, approximately 1 cc of Omnipaque 240 was injected.  An excellent contrast spread along the epidural space and the nerve root was obtained.  At this point, 1cc of normal saline with 5 mg of dexamethasone was injected without complication.  The needle was withdrawn with stylet in situ after being flushed with 1 cc PF lidocaine..  The patient tolerated procedure very well.  The patient was observed until discharge criteria met.  Discharge instructions were given and patient was released to a responsible adult.       Complications: None.    Follow up:  The patient was followed in the pain clinic as needed basis.        Castro Reed MD

## 2024-06-06 NOTE — H&P
History & Physical Examination    Patient Name: Valerio Felipe  MRN: [id number]  CSN: [id number]  YOB: 1970    Pre-Operative Diagnosis:  Lumbar radiculopathy [M54.16]    Present Illness: Lumbar radiculopathy    ASA: 2  MP class: 2  Sedation:   IV sedation (anxiolysis)    Medications Prior to Admission   Medication Sig Dispense Refill Last Dose    HYDROcodone-acetaminophen (NORCO)  MG Oral Tab Take 1 tablet by mouth every 6 (six) hours as needed for Pain. 40 tablet 0 2024    amphetamine-dextroamphetamine (ADDERALL) 20 MG Oral Tab Take 1 tablet (20 mg total) by mouth 2 (two) times daily. 60 tablet 0     losartan 100 MG Oral Tab Take 1 tablet (100 mg total) by mouth daily. 90 tablet 0 Past Week    spironolactone 50 MG Oral Tab Take 1 tablet (50 mg total) by mouth daily. 90 tablet 0 Past Week    atorvastatin 10 MG Oral Tab Take 1 tablet (10 mg total) by mouth daily. 90 tablet 0 Past Week    Dasatinib (SPRYCEL) 100 MG Oral Tab Take 1 tablet by mouth daily.  0 Past Week    Labetalol HCl 200 MG Oral Tab Take 1 tablet (200 mg total) by mouth 2 (two) times daily. 60 tablet 3 Past Week    Vitamin B-12 1000 MCG Oral Tab Take 1 tablet (1,000 mcg total) by mouth daily.   Past Week    Cholecalciferol (VITAMIN D3) 1.25 MG (25439 UT) Oral Cap Take 1 tablet by mouth once a week.   2024    Ascorbic Acid (VITAMIN C) 1000 MG Oral Tab Take 1 tablet (1,000 mg total) by mouth daily.   Past Week    [] ibuprofen 600 MG Oral Tab Take 1 tablet (600 mg total) by mouth every 8 (eight) hours as needed for Pain or Fever. 30 tablet 0     diclofenac 75 MG Oral Tab EC Take 1 tablet (75 mg total) by mouth 2 (two) times daily as needed. 60 tablet 1      Current Facility-Administered Medications   Medication Dose Route Frequency    atorvastatin (Lipitor) tab 10 mg  10 mg Oral Daily    labetalol (Normodyne) tab 200 mg  200 mg Oral 2x Daily(Beta Blocker)    acetaminophen (Tylenol Extra Strength) tab 1,000 mg  1,000 mg  Oral Q8H PRN    melatonin tab 3 mg  3 mg Oral Nightly PRN    [Held by provider] heparin (Porcine) 5000 UNIT/ML injection 5,000 Units  5,000 Units Subcutaneous Q8H KEYSHA    ondansetron (Zofran) 4 MG/2ML injection 4 mg  4 mg Intravenous Q6H PRN    prochlorperazine (Compazine) 10 MG/2ML injection 5 mg  5 mg Intravenous Q8H PRN    polyethylene glycol (PEG 3350) (Miralax) 17 g oral packet 17 g  17 g Oral Daily PRN    sennosides (Senokot) tab 17.2 mg  17.2 mg Oral Nightly PRN    bisacodyl (Dulcolax) 10 MG rectal suppository 10 mg  10 mg Rectal Daily PRN    fleet enema (Fleet) 7-19 GM/118ML rectal enema 133 mL  1 enema Rectal Once PRN    acetaminophen (Tylenol) tab 650 mg  650 mg Oral Q4H PRN    Or    HYDROcodone-acetaminophen (Norco) 5-325 MG per tab 1 tablet  1 tablet Oral Q4H PRN    Or    HYDROcodone-acetaminophen (Norco) 5-325 MG per tab 2 tablet  2 tablet Oral Q4H PRN    HYDROmorphone (Dilaudid) 1 MG/ML injection 0.2 mg  0.2 mg Intravenous Q2H PRN    Or    HYDROmorphone (Dilaudid) 1 MG/ML injection 0.4 mg  0.4 mg Intravenous Q2H PRN    Or    HYDROmorphone (Dilaudid) 1 MG/ML injection 0.8 mg  0.8 mg Intravenous Q2H PRN    benzonatate (Tessalon) cap 200 mg  200 mg Oral TID PRN    guaiFENesin (Robitussin) 100 MG/5 ML oral liquid 200 mg  200 mg Oral Q4H PRN    glycerin-hypromellose- (Artificial Tears) 0.2-0.2-1 % ophthalmic solution 1 drop  1 drop Both Eyes QID PRN    sodium chloride (Saline Mist) 0.65 % nasal solution 1 spray  1 spray Each Nare Q3H PRN    methocarbamol (Robaxin) tab 500 mg  500 mg Oral Q12H       Allergies: No Known Allergies    Past Medical History:    Essential hypertension    High blood pressure    High cholesterol    Leukemia (HCC)    Leukemia (HCC)    Personal history of antineoplastic chemotherapy    oral chemo (Sprycel)    Smoking     Past Surgical History:   Procedure Laterality Date    Colonoscopy  07/2016    diverticulosis, polyps (adenoma/hp). repeat 5 years    Colonoscopy N/A 05/17/2021     Procedure: COLONOSCOPY;  Surgeon: Fortino Maier MD;  Location:  ENDOSCOPY    Colonoscopy,biopsy N/A 2016    Procedure: COLONOSCOPY, POSSIBLE BIOPSY, POSSIBLE POLYPECTOMY 04235;  Surgeon: Fortino Maier MD;  Location: [facility]    Colonoscopy,remv lesn,snare N/A 2016    Procedure: COLONOSCOPY, POSSIBLE BIOPSY, POSSIBLE POLYPECTOMY 81604;  Surgeon: Fortino Maier MD;  Location: [facility]    Create eardrum opening,gen anesth  10/07/2021    Knee arthroscopy      Lap gastric bypass/hernando-en-y      Other surgical history      Rotator Cuff Repair left side    Other surgical history Right     meniscus     Family History   Problem Relation Age of Onset    Other (COPD [Other]) Father     Heart Attack Father     Stroke Father     Breast Cancer Mother     Diabetes Mother      Social History     Tobacco Use    Smoking status: Former     Current packs/day: 0.00     Types: Cigarettes     Quit date: 8/10/2018     Years since quittin.8    Smokeless tobacco: Never   Substance Use Topics    Alcohol use: Yes     Comment: once per month       SYSTEM Check if Review is Normal Check if Physical Exam is Normal If not normal, please explain:   HEENT [x ] [x ]    NECK & BACK [x ] [x ]    HEART [x ] [x ]    LUNGS [x ] [x ]    ABDOMEN [x ] [x ]    UROGENITAL [x ] [x ]    EXTREMITIES [x ] [x ]    OTHER        [ x ] I have discussed the risks and benefits and alternatives with the patient/family.  They understand and agree to proceed with plan of care.  [ x ] I have reviewed the History and Physical done within the last 30 days.  Any changes noted above.    Castro Reed MD

## 2024-06-06 NOTE — PROGRESS NOTES
Name: Valerio Felipe   : 1970   DOS: 2024     Pain Clinic Follow Up Visit:     Chief Complaint   Patient presents with    Back Pain       Valerio Felipe is a 53 year old male with a history of lumbar radiculopathy due to disc herniation.  The patient admitted due to intractable low back pain.  Scheduled for transfer epidural steroid injection.  Patient states that symptoms have not improved with IV and oral pain medications.  Still continues to have pain which is functionally lifestyle limiting for    Pt denies any chills, fever, or weakness. There is no bladder or bowel incontinence associated with the pain.    REVIEW OF SYSTEMS:  A ten point review of systems was performed with pertinent positives and negatives in the HPI.    No Known Allergies    No current outpatient medications on file.         EXAM:   /74 (BP Location: Left arm)   Pulse 86   Temp 98.1 °F (36.7 °C) (Oral)   Resp 17   Wt 260 lb (117.9 kg)   SpO2 91%   BMI 40.72 kg/m²   General:  Patient is a(n) 53 year old year old male in no acute distress.  Neurologic:: WNL-Orientation to time, place and person, normal mood & affect, concentration & attention span intact.   Inspection:  Ambulates with well-coordinated, fluid, non-antalgic gait.  Gait is normal.  Neck: Full range of motion.  Peripheral IV in place  Respiratory: Speech is nonlabored.  Saturating well on room air  Cardiovascular: Regular rate and rhythm on telemetry  Back: Overall strength is intact.    IMAGES:     MRI with disc herniation    ASSESSMENT AND PLAN:     1. Intractable back pain    2. Lumbar radiculopathy      The patient is a 53-year-old gentleman with a history of low back pain and lumbar radiculopathy.  This is due to lumbar disc herniation.  Patient has not improved with IV and oral pain medications.  Therefore, we will  proceed with scheduled transforaminal epidural steroid injection.  Encourage PT OT after.  Stable to discharge from pain standpoint  after injection.  Can follow-up as an outpatient.      Orders:  Orders Placed This Encounter   Procedures    Basic Metabolic Panel (8)    CBC With Differential With Platelet    CBC With Differential With Platelet    Basic Metabolic Panel (8)         Radiology orders and consultations:PLACE PIV  PULSE OXIMETRY  VITAL SIGNS  PULSE OXIMETRY  VITAL SIGNS  PULSE OXIMETRY  CARDIAC MONITORING  AMBULATE PATIENT  UP IN CHAIR  INTAKE AND OUTPUT  NOTIFY PHYSICIAN (SPECIFY)  INITIATE ELECTROLYTE PROTOCOL  INITIATE URINARY RETENTION PROTOCOL  INITIATE OXYGEN PROTOCOL  NURSING COMMUNICATION  PULSE OXIMETRY  CARDIAC MONITORING  INITIATE CHRIS CPAP PROTOCOL  NOTIFY PHYSICIAN (SPECIFY)  NOTIFY PHYSICIAN (SPECIFY)  PROVIDE EDUCATIONAL MATERIAL  PLACE PIV  EARLY AMBULATION IF NO CONTRAINDICATIONS  MRI SPINE LUMBAR (CPT=72148)  XR PAIN CLINIC FLUOROSCOPY - N/C  BED REQUEST  IP CONSULT TO ORTHOPEDIC SURGERY  ADMIT TO INPATIENT  RESPIRATORY CARE EVALUATION  REASON FOR NO DVT/VTE MECHANICAL PROPHYLAXIS  IP PT EVAL AND TREAT   IP OT EVAL AND TREAT  TELEMETRY TRACING SCAN  TELEMETRY TRACING SCAN  TELEMETRY TRACING SCAN  NPO  VITAL SIGNS - NOTIFY PHYSICIAN  VITAL SIGNS  PULSE OXIMETRY  NOTIFY PHYSICIAN (SPECIFY)  PLACE PIV  VERIFY INFORMED CONSENT  NPO  The patient indicates understanding of these issues and agrees to the plan.  No follow-ups on file.    Castro Reed MD, 6/6/2024, 11:22 AM

## 2024-06-06 NOTE — PROGRESS NOTES
1635: Pt had his GINA. Was able to work with therapy . Still having some pain. took po norco and robaxin. States he has f/u appts already set with Dr Reed and Dr Rebolledo and is cleared to f/u as outpt with both of them. Pt is concerned that ole sharp unbearable pain will come back and he will end up back in the ER. Pt asking about staying the night tonight. Page sent to Dr Lynn. Awaiting response.    1640: Spoke to Dr Lynn. Pt to stay tonight and she will re-assess in am.

## 2024-06-06 NOTE — PROGRESS NOTES
[facility]   [facility]     Valerio Felipe Patient Status:  Inpatient    1970 MRN [id number]   Location [facility] 3SW-A Attending Tiffani Lynn MD   Hosp Day # 1 PCP Ayse Luo DO     Chief Complaint: Back pain     Subjective:     Patient awaiting pain injection     Objective:    Review of Systems:   A comprehensive review of systems was completed; pertinent positive and negatives stated in subjective.    Vital signs:  Temp:  [97 °F (36.1 °C)-98.4 °F (36.9 °C)] 98 °F (36.7 °C)  Pulse:  [84-95] 88  Resp:  [13-22] 18  BP: (114-154)/(67-99) 121/67  SpO2:  [91 %-100 %] 94 %    Physical Exam:    General: No acute distress  Respiratory: No wheezes, no rhonchi  Cardiovascular: S1, S2, regular rate and rhythm  Abdomen: Soft, Non-tender, non-distended, positive bowel sounds  Neuro: No new focal deficits.   Extremities: No edema      Diagnostic Data:    Labs:  Recent Labs   Lab 24  0505   WBC 12.3* 11.2*   HGB 11.2* 10.1*   MCV 93.8 96.6   .0 217.0       Recent Labs   Lab 24  0459   * 106*   BUN 26* 17   CREATSERUM 1.55* 0.94   CA 8.9 8.9    140   K 4.3 4.5    109   CO2 25.0 25.0       Estimated Creatinine Clearance: 83 mL/min (based on SCr of 0.94 mg/dL).    No results for input(s): \"TROP\", \"TROPHS\", \"CK\" in the last 168 hours.    No results for input(s): \"PTP\", \"INR\" in the last 168 hours.               Microbiology    No results found for this visit on 24.      Imaging: Reviewed in Epic.    Medications:    atorvastatin  10 mg Oral Daily    labetalol  200 mg Oral 2x Daily(Beta Blocker)    [Held by provider] heparin  5,000 Units Subcutaneous Q8H KEYSHA    methocarbamol  500 mg Oral Q12H       Assessment & Plan:      Expand All Collapse All       [facility]            Valerio Felipe Patient Status:  Emergency    1970 MRN [id number]   Location [facility] EMERGENCY DEPARTMENT Attending Demetrio Poe DO   Hosp Day # 0 PCP Ayse Luo DO      Chief Complaint: Back Pain        Subjective:  History of Present Illness:      Valerio Felipe is a 53 year old male with past medical history of hypertension presents to the emergency department with low back pain     Patient has low back pain with burning shooting tingling sensation into his right lower extremity.  He has outpatient follow-up scheduled with an orthospine doctor.  Due to intractable symptoms and inability to find comfortable position he presented to the emergency department.  No chest pain or difficulty breathing.  Recently had diverticulitis but abdominal pain has resolved.  No other complaints at this time.              History/Other:  Past Medical History:  Past Medical History       Past Medical History:    Essential hypertension    High blood pressure    High cholesterol    Leukemia (HCC)    Leukemia (HCC)    Personal history of antineoplastic chemotherapy     oral chemo (Sprycel)    Smoking        Past Surgical History:   Past Surgical History         Past Surgical History:   Procedure Laterality Date    Colonoscopy   07/2016     diverticulosis, polyps (adenoma/hp). repeat 5 years    Colonoscopy N/A 05/17/2021     Procedure: COLONOSCOPY;  Surgeon: Fortino Maier MD;  Location:  ENDOSCOPY    Colonoscopy,biopsy N/A 07/11/2016     Procedure: COLONOSCOPY, POSSIBLE BIOPSY, POSSIBLE POLYPECTOMY 61789;  Surgeon: Fortino Maier MD;  Location: [facility]    Colonoscopy,remv lesn,snare N/A 07/11/2016     Procedure: COLONOSCOPY, POSSIBLE BIOPSY, POSSIBLE POLYPECTOMY 90321;  Surgeon: Fortino Maier MD;  Location: [facility]    Create eardrum opening,gen anesth   10/07/2021    Knee arthroscopy        Lap gastric bypass/hernando-en-y        Other surgical history   2005     Rotator Cuff Repair left side    Other surgical history Right       meniscus         Family History:   Family History          Family History   Problem Relation Age of Onset    Other (COPD [Other]) Father      Heart Attack Father      Stroke Father      Breast Cancer Mother      Diabetes Mother          Social History:    reports that he quit smoking about 5 years ago. His smoking use included cigarettes. He has never used smokeless tobacco. He reports current alcohol use. He reports current drug use. Drug: Cannabis.      Allergies:   Allergies   No Known Allergies        Medications:    Medications Ordered Prior to Encounter             Current Facility-Administered Medications on File Prior to Encounter   Medication Dose Route Frequency Provider Last Rate Last Admin    [COMPLETED] diazepam (Valium) 5 mg/mL injection 5 mg  5 mg Intravenous Once Yobany Soto DO   5 mg at 05/26/24 1107    [COMPLETED] morphINE PF 4 MG/ML injection 4 mg  4 mg Intravenous Once Yobany Soto DO   4 mg at 05/26/24 1108    [COMPLETED] ketorolac (Toradol) 15 MG/ML injection 15 mg  15 mg Intravenous Once Yobany Soto DO   15 mg at 05/26/24 1108    [COMPLETED] dexAMETHasone PF (Decadron) 10 MG/ML injection 10 mg  10 mg Intravenous Once Yobany Soto DO   10 mg at 05/26/24 1108    [COMPLETED] sodium chloride 0.9 % IV bolus 1,000 mL  1,000 mL Intravenous Once Yobany Soto DO   Stopped at 05/26/24 1324    [COMPLETED] HYDROmorphone (Dilaudid) 1 MG/ML injection 1 mg  1 mg Intravenous Once Yobany Soto DO   1 mg at 05/26/24 1227    [COMPLETED] sodium chloride 0.9 % IV bolus 1,000 mL  1,000 mL Intravenous Once Ariadna Houston MD   Stopped at 05/23/24 1928    [COMPLETED] iopamidol 76% (ISOVUE-370) injection for power injector  100 mL Intravenous ONCE PRN Ariadna Houston MD   100 mL at 05/23/24 1907    [COMPLETED] morphINE PF 4 MG/ML injection 4 mg  4 mg Intravenous Once Rain Padron, DO   4 mg at 05/17/24 0808    [COMPLETED] sodium chloride 0.9 % IV bolus 1,000 mL  1,000 mL Intravenous Once Rain Padron, DO 1,000 mL/hr  at 24 0809 1,000 mL at 24 0809    [COMPLETED] HYDROmorphone (Dilaudid) 1 MG/ML injection 1 mg  1 mg Intravenous Once Rain Padron DO   1 mg at 24 0850    [COMPLETED] iopamidol 76% (ISOVUE-370) injection for power injector  100 mL Intravenous ONCE PRN Rain Padron DO   100 mL at 24 0855    [COMPLETED] cefTRIAXone (Rocephin) 2 g in D5W 100 mL IVPB-ADD  2 g Intravenous Once Rain Padron DO   Stopped at 24 1100    [COMPLETED] metRONIDAZOLE in sodium chloride 0.79% (Flagyl) 5 mg/mL IVPB premix 500 mg  500 mg Intravenous Once Rain Padron  mL/hr at 24 1101 500 mg at 24 1101    [] sodium chloride 0.9% infusion   Intravenous Continuous Rain Padron DO        [COMPLETED] metoclopramide (Reglan) tab 10 mg  10 mg Oral Once Pat Altman MD   10 mg at 24 0758    [COMPLETED] famotidine (Pepcid) tab 20 mg  20 mg Oral Once Pat Altman MD   20 mg at 24 0758    [COMPLETED] ceFAZolin (Ancef) 2 g in D5W 50 mL Duplex container  2 g Intravenous Once Pta Altman MD   3 g at 24 0915             Current Outpatient Medications on File Prior to Encounter   Medication Sig Dispense Refill    HYDROcodone-acetaminophen (NORCO)  MG Oral Tab Take 1 tablet by mouth every 6 (six) hours as needed for Pain. 40 tablet 0    [] ibuprofen 600 MG Oral Tab Take 1 tablet (600 mg total) by mouth every 8 (eight) hours as needed for Pain or Fever. 30 tablet 0    amphetamine-dextroamphetamine (ADDERALL) 20 MG Oral Tab Take 1 tablet (20 mg total) by mouth 2 (two) times daily. 60 tablet 0    diclofenac 75 MG Oral Tab EC Take 1 tablet (75 mg total) by mouth 2 (two) times daily as needed. 60 tablet 1    losartan 100 MG Oral Tab Take 1 tablet (100 mg total) by mouth daily. 90 tablet 0    spironolactone 50 MG Oral Tab Take 1 tablet (50 mg total) by mouth daily. 90 tablet 0    atorvastatin 10 MG Oral Tab Take 1 tablet (10 mg total) by mouth daily. 90 tablet 0     Dasatinib (SPRYCEL) 100 MG Oral Tab Take 1 tablet by mouth daily.   0    Labetalol HCl 200 MG Oral Tab Take 1 tablet (200 mg total) by mouth 2 (two) times daily. 60 tablet 3    Vitamin B-12 1000 MCG Oral Tab Take 1 tablet (1,000 mcg total) by mouth daily.        Cholecalciferol (VITAMIN D3) 1.25 MG (92291 UT) Oral Cap Take 1 tablet by mouth once a week.        Ascorbic Acid (VITAMIN C) 1000 MG Oral Tab Take 1 tablet (1,000 mg total) by mouth daily.                Review of Systems:   A comprehensive review of systems was completed.    Pertinent positives and negatives noted in the HPI.           Objective:  Physical Exam:    /80   Pulse 87   Temp 97.2 °F (36.2 °C) (Temporal)   Resp 16   Wt 284 lb 6.3 oz (129 kg)   SpO2 98%   BMI 44.54 kg/m²   General: No acute distress, Alert  Respiratory: No rhonchi, no wheezes  Cardiovascular: S1, S2. Regular rate and rhythm  Abdomen: Soft, Non-tender, non-distended, positive bowel sounds  Neuro: No new focal deficits  Extremities: No edema              Results:  Labs:        Labs Last 24 Hours:         Recent Labs   Lab 06/04/24  2207   RBC 3.56*   HGB 11.2*   HCT 33.4*   MCV 93.8   MCH 31.5   MCHC 33.5   RDW 13.3   NEPRELIM 8.31*   WBC 12.3*   .0             Recent Labs   Lab 06/04/24  2208   *   BUN 26*   CREATSERUM 1.55*   EGFRCR 53*   CA 8.9      K 4.3      CO2 25.0               Lab Results   Component Value Date     INR 1.02 07/27/2022         No results for input(s): \"TROP\", \"TROPHS\", \"CK\" in the last 168 hours.     No results for input(s): \"TROP\", \"PBNP\" in the last 168 hours.     No results for input(s): \"PCT\" in the last 168 hours.     Imaging: Imaging data reviewed in Epic.           Assessment & Plan:  #Intractable Lumbar radiculopathy  -pain control, PRN opioids - limit as able  -can consider trial of Decadron   -Robaxin   -Ortho spine to see  -PT/OT     #Acute Kidney Injury  -IVF     #Essential hypertension  -Continue  Labetalol  -Hold Losartan/Aldactone      #Hyperlipidemia  -Continue home statin     #CML  -primary oncologist at outside hospital   -PTA: Dasatinib     #History of gastric bypass surgery     #Morbid obesity  -BMI 40.72         Tiffani Lynn MD    Supplementary Documentation:     Quality:  DVT Mechanical Prophylaxis:     Early ambuation  DVT Pharmacologic Prophylaxis   Medication    [Held by provider] heparin (Porcine) 5000 UNIT/ML injection 5,000 Units                Code Status: Not on file  Jamil: No urinary catheter in place  Jamil Duration (in days):   Central line:    PAUL: 6/6/2024    Discharge is dependent on: clinical   At this point Mr. Felipe is expected to be discharge to: home    The 21st Century Cures Act makes medical notes like these available to patients in the interest of transparency. Please be advised this is a medical document. Medical documents are intended to carry relevant information, facts as evident, and the clinical opinion of the practitioner. The medical note is intended as peer to peer communication and may appear blunt or direct. It is written in medical language and may contain abbreviations or verbiage that are unfamiliar.

## 2024-06-07 PROCEDURE — 99233 SBSQ HOSP IP/OBS HIGH 50: CPT | Performed by: STUDENT IN AN ORGANIZED HEALTH CARE EDUCATION/TRAINING PROGRAM

## 2024-06-07 PROCEDURE — 99232 SBSQ HOSP IP/OBS MODERATE 35: CPT | Performed by: STUDENT IN AN ORGANIZED HEALTH CARE EDUCATION/TRAINING PROGRAM

## 2024-06-07 RX ORDER — KETOROLAC TROMETHAMINE 15 MG/ML
30 INJECTION, SOLUTION INTRAMUSCULAR; INTRAVENOUS EVERY 6 HOURS
Status: DISCONTINUED | OUTPATIENT
Start: 2024-06-07 | End: 2024-06-07

## 2024-06-07 RX ORDER — CYCLOBENZAPRINE HCL 10 MG
10 TABLET ORAL 3 TIMES DAILY PRN
Status: DISCONTINUED | OUTPATIENT
Start: 2024-06-07 | End: 2024-06-10

## 2024-06-07 RX ORDER — KETOROLAC TROMETHAMINE 15 MG/ML
30 INJECTION, SOLUTION INTRAMUSCULAR; INTRAVENOUS EVERY 6 HOURS
Status: COMPLETED | OUTPATIENT
Start: 2024-06-07 | End: 2024-06-09

## 2024-06-07 NOTE — PAYOR COMM NOTE
6/6 & 6/7  CONTINUED STAY REVIEW    Payor: [facility] CORE/NAVIGATE  Subscriber #:  [id number]  Authorization Number: [id number]    Admit date: 6/5/24  Admit time:  2:24 AM      MEDICATIONS ADMINISTERED IN LAST 1 DAY:  atorvastatin (Lipitor) tab 10 mg       Date Action Dose Route User    6/7/2024 0836 Given 10 mg Oral Soco Boone RN          docusate sodium (Colace) cap 100 mg       Date Action Dose Route User    6/7/2024 0836 Given 100 mg Oral Soco Boone RN          HYDROcodone-acetaminophen (Norco) 5-325 MG per tab 2 tablet       Date Action Dose Route User    6/7/2024 0830 Given 2 tablet Oral Soco Boone RN    6/7/2024 0522 Given 2 tablet Oral Yara Esposito RN    6/6/2024 1920 Given 2 tablet Oral Jerry Foster RN          ketorolac (Toradol) 15 MG/ML injection 30 mg       Date Action Dose Route User    6/7/2024 1251 Given 30 mg Intravenous Soco Boone RN          labetalol (Normodyne) tab 200 mg       Date Action Dose Route User    6/7/2024 0522 Given 200 mg Oral Yara Esposito RN    6/6/2024 1645 Given 200 mg Oral Jerry Foster RN          methocarbamol (Robaxin) tab 500 mg       Date Action Dose Route User    6/7/2024 1443 Given 500 mg Oral Soco Boone RN    6/7/2024 0216 Given 500 mg Oral Yara Esposito RN          polyethylene glycol (PEG 3350) (Miralax) 17 g oral packet 17 g       Date Action Dose Route User    6/7/2024 1030 Given 17 g Oral Soco Boone RN            Vitals (last day)       Date/Time Temp Pulse Resp BP SpO2 Weight O2 Device O2 Flow Rate (L/min) Who    06/07/24 1200 98.1 °F (36.7 °C) 82 16 131/75 95 % -- None (Room air) -- EV    06/07/24 0821 98.1 °F (36.7 °C) 83 16 143/84 99 % -- None (Room air) -- EV    06/07/24 0420 98.1 °F (36.7 °C) 77 18 114/55 98 % -- -- -- VB    06/06/24 2300 98.2 °F (36.8 °C) 93 18 119/80 98 % -- -- -- VB    06/06/24 1926 98.6 °F (37 °C) 97 18 124/63 98 % -- -- -- VB    06/06/24 1549 98.2 °F (36.8 °C) 102 16 131/71 96 % -- None (Room air) 0  L/min     06/06/24 1421 -- -- -- 144/76 -- -- -- --     06/06/24 1315 98.6 °F (37 °C) 95 17 -- 96 % -- None (Room air) 0 L/min     06/06/24 1210 -- 88 18 121/67 94 % -- None (Room air) --     06/06/24 1205 -- 95 18 122/70 95 % -- -- --     06/06/24 1200 -- 90 18 125/80 93 % -- -- --     06/06/24 1155 -- 92 18 114/72 92 % -- -- --     06/06/24 1150 -- 88 18 126/80 95 % -- None (Room air) --     06/06/24 1140 -- 95 17 -- 100 % -- -- --     06/06/24 1139 -- 92 22 -- 100 % -- -- --     06/06/24 1138 -- 93 15 -- 99 % -- -- --     06/06/24 1137 -- 91 13 -- 98 % -- -- --     06/06/24 1136 -- 93 14 145/99 98 % -- -- --     06/06/24 1135 -- 93 18 -- 98 % -- -- --     06/06/24 1134 -- 91 18 -- 100 % -- -- --     06/06/24 1131 -- 93 20 154/87 100 % -- Nasal cannula 3 L/min     06/06/24 1122 98 °F (36.7 °C) 90 20 143/74 97 % -- None (Room air) -- SL    06/06/24 0745 98.1 °F (36.7 °C) 86 17 145/74 91 % -- None (Room air) 0 L/min     06/06/24 0336 98 °F (36.7 °C) 89 18 132/80 97 % -- None (Room air) --     06/06/24 0028 98.4 °F (36.9 °C) 93 18 132/83 96 % -- None (Room air) --        6/6 PAIN SERVICE NOTE    Pre-Operative Diagnosis:  Lumbar radiculopathy [M54.16]     Present Illness: Lumbar radiculopathy           Chief Complaint   Patient presents with    Back Pain         Valerio Felipe is a 53 year old male with a history of lumbar radiculopathy due to disc herniation.  The patient admitted due to intractable low back pain.  Scheduled for transfer epidural steroid injection.  Patient states that symptoms have not improved with IV and oral pain medications.  Still continues to have pain which is functionally lifestyle limiting for          EXAM:   /74 (BP Location: Left arm)   Pulse 86   Temp 98.1 °F (36.7 °C) (Oral)   Resp 17   Wt 260 lb (117.9 kg)   SpO2 91%   BMI 40.72 kg/m²   General:  Patient is a(n) 53 year old year old male in no acute distress.  Neurologic::  WNL-Orientation to time, place and person, normal mood & affect, concentration & attention span intact.   Inspection:  Ambulates with well-coordinated, fluid, non-antalgic gait.  Gait is normal.  Neck: Full range of motion.  Peripheral IV in place  Respiratory: Speech is nonlabored.  Saturating well on room air  Cardiovascular: Regular rate and rhythm on telemetry  Back: Overall strength is intact.     IMAGES:      MRI with disc herniation     ASSESSMENT AND PLAN:      1. Intractable back pain    2. Lumbar radiculopathy       The patient is a 53-year-old gentleman with a history of low back pain and lumbar radiculopathy.  This is due to lumbar disc herniation.  Patient has not improved with IV and oral pain medications.  Therefore, we will  proceed with scheduled transforaminal epidural steroid injection.  Encourage PT OT after.      6/6 OP NOTE  Preoperative Diagnosis:  Lumbar radiculopathy [M54.16]     Postoperative Diagnosis: Same as above.     Procedure performed: Right lumbar 5-sacral 1, sacral 1- 2 TRANSFORAMINAL   EPIDURAL STEROID INJECTION MULTIPLE LEVEL with sedation      6/6 HOSPITALIST NOTE    Subjective:  Patient awaiting pain injection            Objective:  Review of Systems:   A comprehensive review of systems was completed; pertinent positive and negatives stated in subjective.     Vital signs:  Temp:  [97 °F (36.1 °C)-98.4 °F (36.9 °C)] 98 °F (36.7 °C)  Pulse:  [84-95] 88  Resp:  [13-22] 18  BP: (114-154)/(67-99) 121/67  SpO2:  [91 %-100 %] 94 %            Assessment & Plan:  #Intractable Lumbar radiculopathy  -pain control, PRN opioids - limit as able  -can consider trial of Decadron   -Robaxin   -Ortho spine to see  -PT/OT     #Acute Kidney Injury  -IVF     #Essential hypertension  -Continue Labetalol  -Hold Losartan/Aldactone      #Hyperlipidemia  -Continue home statin     #CML  -primary oncologist at outside hospital   -PTA: Dasatinib     #History of gastric bypass surgery     #Morbid  obesity  -BMI 40.72    6/7 ORTHO NOTE    Subjective:  Subjective: GINA temporarily helped w/ pain, but right radicular pain has returned. Hurts w/ activity.            Objective:  Blood pressure 131/75, pulse 82, temperature 98.1 °F (36.7 °C), temperature source Oral, resp. rate 16, weight 260 lb (117.9 kg), SpO2 95%.      Spine Exam:  LE Strength: 5/5 IP Quad TA EHL GSC  LE Sensation: normal in L2-S1 distribution  LE reflexes: normal        Assessment & Plan:  53M w/ L5-S1 disc hernation  - Discussed operative and non operative treatment options  - Trial of Toradol and give GINA some more time  - Patient feels unsafe to return home pending improvement in symptoms        6/7 HOSPITALIST NOTE    Subjective:  Patient  had transient relief of back pain, thigh discomfort after GINA but pain this AM 8/10.       Vital signs:  Temp:  [98.1 °F (36.7 °C)-98.6 °F (37 °C)] 98.1 °F (36.7 °C)  Pulse:  [] 82  Resp:  [16-18] 16  BP: (114-144)/(55-84) 131/75  SpO2:  [95 %-99 %] 95 %     Physical Exam:    General: No acute distress  Respiratory: No wheezes, no rhonchi  Cardiovascular: S1, S2, regular rate and rhythm  Abdomen: Soft, Non-tender, non-distended, positive bowel sounds  Neuro: No new focal deficits.   Extremities: No edema       Medications:   Scheduled Medications    ketorolac  30 mg Intravenous q6h    docusate sodium  100 mg Oral BID    atorvastatin  10 mg Oral Daily    labetalol  200 mg Oral 2x Daily(Beta Blocker)    [Held by provider] heparin  5,000 Units Subcutaneous Q8H KEYSHA    methocarbamol  500 mg Oral Q12H                 Assessment & Plan:  #Intractable Lumbar radiculopathy  - PRN pain meds   - s/p GINA on 6/6/ no relief      #Acute Kidney Injury  -IVF  -resolved     #Essential hypertension  -Continue Labetalol  -Hold Losartan/Aldactone      #Hyperlipidemia  -Continue home statin     #CML- will need his home meds restarted  -primary oncologist at outside hospital   -PTA: Dasatinib     #History of gastric bypass  surgery     #Morbid obesity  -BMI 40.72

## 2024-06-07 NOTE — PLAN OF CARE
Patient A & O x4. VSS, on RA. C/o mod pain, norco given. Up ad hernandez. Voiding freely. Bandaid to injection site. Safety measures in place. Instructed to use call light.    .

## 2024-06-07 NOTE — PROGRESS NOTES
[facility]   part of [facility]    Progress Note    Valerio Felipe Patient Status:  Inpatient    1970 MRN [id number]   Location [facility] 3SW-A Attending Tiffani Lynn MD   Hosp Day # 2 PCP Ayse Luo DO     Chief Complaint: Lumbar disc herniation    Subjective:   Subjective: GINA temporarily helped w/ pain, but right radicular pain has returned. Hurts w/ activity.     Objective:   Blood pressure 131/75, pulse 82, temperature 98.1 °F (36.7 °C), temperature source Oral, resp. rate 16, weight 260 lb (117.9 kg), SpO2 95%.  Physical Exam  Neurologic Exam    Physical Exam  Constitutional:       Appearance: Normal appearance.   HENT:      Head: Normocephalic and atraumatic.   Eyes:      Extraocular Movements: Extraocular movements intact.   Cardiovascular:      Pulses: Normal pulses. Skin warm and well perfused.  Pulmonary:      Effort: Pulmonary effort is normal. No respiratory distress.   Skin:     General: Skin is warm.   Psychiatric:         Mood and Affect: Mood normal.     Spine Exam:  LE Strength: 5/5 IP Quad TA EHL GSC  LE Sensation: normal in L2-S1 distribution  LE reflexes: normal      Results:   Lab Results   Component Value Date    WBC 11.2 (H) 2024    HGB 10.1 (L) 2024    HCT 31.7 (L) 2024    .0 2024    CREATSERUM 0.94 2024    BUN 17 2024     2024    K 4.5 2024     2024    CO2 25.0 2024     (H) 2024    CA 8.9 2024    ALB 3.4 2024    ALKPHO 73 2024    BILT 0.3 2024    TP 7.3 2024    AST 58 (H) 2024    ALT 86 (H) 2024    PTT 29.7 2022    INR 1.02 2022    T4F 1.0 2023    TSH 1.040 2023    LIP 25 2024    PSA 0.85 2023    DDIMER 0.87 (H) 2024    ESRPF 10 2013    CRP 0.81 2013    MG 1.9 2024    TROP <0.045 2021    B12 741 2023    POCGLU 107 (H) 2014       XR PAIN CLINIC  FLUOROSCOPY - N/C    Result Date: 6/6/2024  CONCLUSION:  Please see the procedure/operative report for further details.    LOCATION:  [facility]    Dictated by (CST): Stromberg, LeRoy, MD on 6/06/2024 at 11:59 AM     Finalized by (CST): Stromberg, LeRoy, MD on 6/06/2024 at 12:00 PM            Assessment & Plan:   53M w/ L5-S1 disc hernation  - Discussed operative and non operative treatment options  - Trial of Toradol and give GINA some more time  - Patient feels unsafe to return home pending improvement in symptoms    Mark Rebolledo MD  6/6/2024

## 2024-06-08 PROCEDURE — 99232 SBSQ HOSP IP/OBS MODERATE 35: CPT | Performed by: STUDENT IN AN ORGANIZED HEALTH CARE EDUCATION/TRAINING PROGRAM

## 2024-06-08 NOTE — PLAN OF CARE
Patient is alert and oriented x4. VSS on RA. Decreased sensation and tingling to back of Right leg / thigh. Pulses bilateral +3. Appropriate strength and mobility in lower extremities, weaker to Right side. Pain last rated a 7/10. Patient ambulates ad hernandez w/ steady gait. IS and ankle pumps encouraged. Belongings within reach. Encouraged to call for any needs.

## 2024-06-08 NOTE — PLAN OF CARE
Patient A/O x4, VSS on RA, c/o mod-severe pain. , tele. Voiding freely via toilet, last BM 6/7. Plan for pain control and dc home when cleared. Safety measures in place.

## 2024-06-08 NOTE — PROGRESS NOTES
[facility]   part of [facility]     Hospitalist Progress Note     Valerio Felipe Patient Status:  Inpatient    1970 MRN [id number]   Location [facility] 3SW-A Attending Tiffani Lynn MD   Hosp Day # 3 PCP Ayse Luo DO     Chief Complaint: Back pain     Subjective:     Patient  reporting 710 pain, buttock and thigh    Objective:    Review of Systems:   A comprehensive review of systems was completed; pertinent positive and negatives stated in subjective.    Vital signs:  Temp:  [97.7 °F (36.5 °C)-98.2 °F (36.8 °C)] 97.8 °F (36.6 °C)  Pulse:  [70-88] 88  Resp:  [16-20] 18  BP: (123-138)/(67-85) 133/85  SpO2:  [97 %-99 %] 97 %    Physical Exam:    General: No acute distress  Respiratory: No wheezes, no rhonchi  Cardiovascular: S1, S2, regular rate and rhythm  Abdomen: Soft, Non-tender, non-distended, positive bowel sounds  Neuro: No new focal deficits.   Extremities: No edema      Diagnostic Data:    Labs:  Recent Labs   Lab 24  0505   WBC 12.3* 11.2*   HGB 11.2* 10.1*   MCV 93.8 96.6   .0 217.0       Recent Labs   Lab 24  0459   * 106*   BUN 26* 17   CREATSERUM 1.55* 0.94   CA 8.9 8.9    140   K 4.3 4.5    109   CO2 25.0 25.0       Estimated Creatinine Clearance: 83 mL/min (based on SCr of 0.94 mg/dL).    No results for input(s): \"TROP\", \"TROPHS\", \"CK\" in the last 168 hours.    No results for input(s): \"PTP\", \"INR\" in the last 168 hours.               Microbiology    No results found for this visit on 24.      Imaging: Reviewed in Epic.    Medications:    ketorolac  30 mg Intravenous q6h    docusate sodium  100 mg Oral BID    atorvastatin  10 mg Oral Daily    labetalol  200 mg Oral 2x Daily(Beta Blocker)    [Held by provider] heparin  5,000 Units Subcutaneous Q8H KEYSHA    methocarbamol  500 mg Oral Q12H       Assessment & Plan:      Expand All Collapse All       [facility]  History and Physical            Valerio  NEELIMA Felipe Patient Status:  Emergency    1970 MRN [id number]   Location [facility] EMERGENCY DEPARTMENT Attending Demetrio Poe DO   Hosp Day # 0 PCP Ayse Luo DO      Chief Complaint: Back Pain        Subjective:  History of Present Illness:      Valerio Felipe is a 53 year old male with past medical history of hypertension presents to the emergency department with low back pain     Patient has low back pain with burning shooting tingling sensation into his right lower extremity.  He has outpatient follow-up scheduled with an orthospine doctor.  Due to intractable symptoms and inability to find comfortable position he presented to the emergency department.  No chest pain or difficulty breathing.  Recently had diverticulitis but abdominal pain has resolved.  No other complaints at this time.              History/Other:  Past Medical History:  Past Medical History       Past Medical History:    Essential hypertension    High blood pressure    High cholesterol    Leukemia (HCC)    Leukemia (HCC)    Personal history of antineoplastic chemotherapy     oral chemo ([facility]ry[facility])    Smoking        Past Surgical History:   Past Surgical History         Past Surgical History:   Procedure Laterality Date    Colonoscopy   2016     diverticulosis, polyps (adenoma/hp). repeat 5 years    Colonoscopy N/A 2021     Procedure: COLONOSCOPY;  Surgeon: Fortino Maier MD;  Location:  ENDOSCOPY    Colonoscopy,biopsy N/A 2016     Procedure: COLONOSCOPY, POSSIBLE BIOPSY, POSSIBLE POLYPECTOMY 14591;  Surgeon: Fortino Maier MD;  Location: [facility]    Colonoscopy,remv lesn,snare N/A 2016     Procedure: COLONOSCOPY, POSSIBLE BIOPSY, POSSIBLE POLYPECTOMY 27595;  Surgeon: Fortino Maier MD;  Location: [facility]    Create eardrum opening,gen anesth   10/07/2021    Knee arthroscopy        Lap gastric bypass/hernando-en-y        Other surgical history        Rotator Cuff Repair left side     Other surgical history Right       meniscus         Family History:   Family History         Family History   Problem Relation Age of Onset    Other (COPD [Other]) Father      Heart Attack Father      Stroke Father      Breast Cancer Mother      Diabetes Mother          Social History:    reports that he quit smoking about 5 years ago. His smoking use included cigarettes. He has never used smokeless tobacco. He reports current alcohol use. He reports current drug use. Drug: Cannabis.      Allergies:   Allergies   No Known Allergies        Medications:    Medications Ordered Prior to Encounter             Current Facility-Administered Medications on File Prior to Encounter   Medication Dose Route Frequency Provider Last Rate Last Admin    [COMPLETED] diazepam (Valium) 5 mg/mL injection 5 mg  5 mg Intravenous Once Yobany Soto DO   5 mg at 05/26/24 1107    [COMPLETED] morphINE PF 4 MG/ML injection 4 mg  4 mg Intravenous Once Yobany Soto DO   4 mg at 05/26/24 1108    [COMPLETED] ketorolac (Toradol) 15 MG/ML injection 15 mg  15 mg Intravenous Once Yobany Soto DO   15 mg at 05/26/24 1108    [COMPLETED] dexAMETHasone PF (Decadron) 10 MG/ML injection 10 mg  10 mg Intravenous Once Yobany Soto DO   10 mg at 05/26/24 1108    [COMPLETED] sodium chloride 0.9 % IV bolus 1,000 mL  1,000 mL Intravenous Once Yobany Soto DO   Stopped at 05/26/24 1324    [COMPLETED] HYDROmorphone (Dilaudid) 1 MG/ML injection 1 mg  1 mg Intravenous Once Yobany Soto DO   1 mg at 05/26/24 1227    [COMPLETED] sodium chloride 0.9 % IV bolus 1,000 mL  1,000 mL Intravenous Once Ariadna Houston MD   Stopped at 05/23/24 1928    [COMPLETED] iopamidol 76% (ISOVUE-370) injection for power injector  100 mL Intravenous ONCE PRN Ariadna Houston MD   100 mL at 05/23/24 1907    [COMPLETED] morphINE PF 4 MG/ML injection 4 mg  4 mg Intravenous Once Rain Padron DO   4 mg at 05/17/24 0808    [COMPLETED] sodium  chloride 0.9 % IV bolus 1,000 mL  1,000 mL Intravenous Once Rain Padron, DO 1,000 mL/hr at 24 0809 1,000 mL at 24 0809    [COMPLETED] HYDROmorphone (Dilaudid) 1 MG/ML injection 1 mg  1 mg Intravenous Once Rain Padron DO   1 mg at 24 0850    [COMPLETED] iopamidol 76% (ISOVUE-370) injection for power injector  100 mL Intravenous ONCE PRN Rain Padron, DO   100 mL at 24 0855    [COMPLETED] cefTRIAXone (Rocephin) 2 g in D5W 100 mL IVPB-ADD  2 g Intravenous Once Rain Padron DO   Stopped at 24 1100    [COMPLETED] metRONIDAZOLE in sodium chloride 0.79% (Flagyl) 5 mg/mL IVPB premix 500 mg  500 mg Intravenous Once Rain Padron,  mL/hr at 24 1101 500 mg at 24 1101    [] sodium chloride 0.9% infusion   Intravenous Continuous Rain Padron DO        [COMPLETED] metoclopramide (Reglan) tab 10 mg  10 mg Oral Once Pat Altman MD   10 mg at 24 0758    [COMPLETED] famotidine (Pepcid) tab 20 mg  20 mg Oral Once Pat Altman MD   20 mg at 24 0758    [COMPLETED] ceFAZolin (Ancef) 2 g in D5W 50 mL Duplex container  2 g Intravenous Once Pat Altman MD   3 g at 24 0915             Current Outpatient Medications on File Prior to Encounter   Medication Sig Dispense Refill    HYDROcodone-acetaminophen (NORCO)  MG Oral Tab Take 1 tablet by mouth every 6 (six) hours as needed for Pain. 40 tablet 0    [] ibuprofen 600 MG Oral Tab Take 1 tablet (600 mg total) by mouth every 8 (eight) hours as needed for Pain or Fever. 30 tablet 0    amphetamine-dextroamphetamine (ADDERALL) 20 MG Oral Tab Take 1 tablet (20 mg total) by mouth 2 (two) times daily. 60 tablet 0    diclofenac 75 MG Oral Tab EC Take 1 tablet (75 mg total) by mouth 2 (two) times daily as needed. 60 tablet 1    losartan 100 MG Oral Tab Take 1 tablet (100 mg total) by mouth daily. 90 tablet 0    spironolactone 50 MG Oral Tab Take 1 tablet (50 mg total) by mouth daily. 90 tablet 0     atorvastatin 10 MG Oral Tab Take 1 tablet (10 mg total) by mouth daily. 90 tablet 0    Dasatinib (SPRYCEL) 100 MG Oral Tab Take 1 tablet by mouth daily.   0    Labetalol HCl 200 MG Oral Tab Take 1 tablet (200 mg total) by mouth 2 (two) times daily. 60 tablet 3    Vitamin B-12 1000 MCG Oral Tab Take 1 tablet (1,000 mcg total) by mouth daily.        Cholecalciferol (VITAMIN D3) 1.25 MG (77295 UT) Oral Cap Take 1 tablet by mouth once a week.        Ascorbic Acid (VITAMIN C) 1000 MG Oral Tab Take 1 tablet (1,000 mg total) by mouth daily.                Review of Systems:   A comprehensive review of systems was completed.    Pertinent positives and negatives noted in the HPI.           Objective:  Physical Exam:    /80   Pulse 87   Temp 97.2 °F (36.2 °C) (Temporal)   Resp 16   Wt 284 lb 6.3 oz (129 kg)   SpO2 98%   BMI 44.54 kg/m²   General: No acute distress, Alert  Respiratory: No rhonchi, no wheezes  Cardiovascular: S1, S2. Regular rate and rhythm  Abdomen: Soft, Non-tender, non-distended, positive bowel sounds  Neuro: No new focal deficits  Extremities: No edema              Results:  Labs:        Labs Last 24 Hours:         Recent Labs   Lab 06/04/24  2207   RBC 3.56*   HGB 11.2*   HCT 33.4*   MCV 93.8   MCH 31.5   MCHC 33.5   RDW 13.3   NEPRELIM 8.31*   WBC 12.3*   .0             Recent Labs   Lab 06/04/24  2208   *   BUN 26*   CREATSERUM 1.55*   EGFRCR 53*   CA 8.9      K 4.3      CO2 25.0               Lab Results   Component Value Date     INR 1.02 07/27/2022         No results for input(s): \"TROP\", \"TROPHS\", \"CK\" in the last 168 hours.     No results for input(s): \"TROP\", \"PBNP\" in the last 168 hours.     No results for input(s): \"PCT\" in the last 168 hours.     Imaging: Imaging data reviewed in Epic.           Assessment & Plan:  #Intractable Lumbar radiculopathy  - PRN pain meds   - s/p GINA on 6/6/ no relief      #Acute Kidney Injury  -IVF  -resolved     #Essential  hypertension  -Continue Labetalol  -Hold Losartan/Aldactone      #Hyperlipidemia  -Continue home statin     #CML- will need his home meds restarted  -primary oncologist at outside hospital   -PTA: Dasatinib     #History of gastric bypass surgery     #Morbid obesity  -BMI 40.72         Tiffani Lynn MD    Supplementary Documentation:     Quality:  DVT Mechanical Prophylaxis:     Early ambuation  DVT Pharmacologic Prophylaxis   Medication    [Held by provider] heparin (Porcine) 5000 UNIT/ML injection 5,000 Units                Code Status: Not on file  Jamil: No urinary catheter in place  Jamil Duration (in days):   Central line:    PAUL: 6/7/2024    Discharge is dependent on: clinical   At this point Mr. Felipe is expected to be discharge to: home    The 21st Century Cures Act makes medical notes like these available to patients in the interest of transparency. Please be advised this is a medical document. Medical documents are intended to carry relevant information, facts as evident, and the clinical opinion of the practitioner. The medical note is intended as peer to peer communication and may appear blunt or direct. It is written in medical language and may contain abbreviations or verbiage that are unfamiliar.

## 2024-06-09 PROCEDURE — 99233 SBSQ HOSP IP/OBS HIGH 50: CPT | Performed by: STUDENT IN AN ORGANIZED HEALTH CARE EDUCATION/TRAINING PROGRAM

## 2024-06-09 RX ORDER — METHYLPREDNISOLONE 4 MG/1
4 TABLET ORAL NIGHTLY
Status: DISCONTINUED | OUTPATIENT
Start: 2024-06-13 | End: 2024-06-10

## 2024-06-09 RX ORDER — METHYLPREDNISOLONE 4 MG/1
4 TABLET ORAL
Status: DISCONTINUED | OUTPATIENT
Start: 2024-06-10 | End: 2024-06-10

## 2024-06-09 RX ORDER — METHYLPREDNISOLONE 4 MG/1
4 TABLET ORAL
Status: COMPLETED | OUTPATIENT
Start: 2024-06-09 | End: 2024-06-09

## 2024-06-09 RX ORDER — METHYLPREDNISOLONE 4 MG/1
4 TABLET ORAL
Status: DISCONTINUED | OUTPATIENT
Start: 2024-06-14 | End: 2024-06-10

## 2024-06-09 RX ORDER — METHYLPREDNISOLONE 4 MG/1
8 TABLET ORAL
Status: DISCONTINUED | OUTPATIENT
Start: 2024-06-10 | End: 2024-06-10

## 2024-06-09 RX ORDER — METHYLPREDNISOLONE 4 MG/1
4 TABLET ORAL
Status: DISCONTINUED | OUTPATIENT
Start: 2024-06-11 | End: 2024-06-10

## 2024-06-09 RX ORDER — KETOROLAC TROMETHAMINE 15 MG/ML
30 INJECTION, SOLUTION INTRAMUSCULAR; INTRAVENOUS EVERY 6 HOURS
Status: DISCONTINUED | OUTPATIENT
Start: 2024-06-09 | End: 2024-06-09

## 2024-06-09 RX ORDER — METHYLPREDNISOLONE 4 MG/1
8 TABLET ORAL
Status: COMPLETED | OUTPATIENT
Start: 2024-06-09 | End: 2024-06-09

## 2024-06-09 RX ORDER — KETOROLAC TROMETHAMINE 30 MG/ML
30 INJECTION, SOLUTION INTRAMUSCULAR; INTRAVENOUS EVERY 6 HOURS
Status: DISCONTINUED | OUTPATIENT
Start: 2024-06-09 | End: 2024-06-10

## 2024-06-09 RX ORDER — METHYLPREDNISOLONE 4 MG/1
4 TABLET ORAL
Status: DISCONTINUED | OUTPATIENT
Start: 2024-06-13 | End: 2024-06-10

## 2024-06-09 RX ORDER — HYDROCODONE BITARTRATE AND ACETAMINOPHEN 10; 325 MG/1; MG/1
1 TABLET ORAL EVERY 4 HOURS PRN
Status: DISCONTINUED | OUTPATIENT
Start: 2024-06-09 | End: 2024-06-10

## 2024-06-09 RX ORDER — METHYLPREDNISOLONE 4 MG/1
4 TABLET ORAL
Status: DISCONTINUED | OUTPATIENT
Start: 2024-06-12 | End: 2024-06-10

## 2024-06-09 RX ORDER — HYDROCODONE BITARTRATE AND ACETAMINOPHEN 10; 325 MG/1; MG/1
2 TABLET ORAL EVERY 4 HOURS PRN
Status: DISCONTINUED | OUTPATIENT
Start: 2024-06-09 | End: 2024-06-10

## 2024-06-09 NOTE — PLAN OF CARE
Patient A&O X4 on RA. VSS, /IS. SCDs encouraged. Voiding freely via bathroom, LBM 6/8. Pain controlled with PO medication. Decreased sensation to right thigh. Up ad hernandez. Reminded to use call light. Plan to dc home when cleared.

## 2024-06-09 NOTE — PROGRESS NOTES
[facility]    Patient Name: Valerio Felipe   YOB: 1970  Medical Record Number: [id number]  Attending Physician: Twin Giang M.D.      The 21st Century Cures Act makes medical notes like these available to patients in the interest of transparency. Please be advised this is a medical document. Medical documents are intended to carry relevant information, facts as evident, and the clinical opinion of the practitioner. The medical note is intended as peer to peer communication and may appear blunt or direct. It is written in medical language and may contain abbreviations or verbiage that are unfamiliar.      Patient with CML. On dasatinib 100 mg daily as outpatient. OK to continue medication as inpatient.     Electronically signed by:    Twin Giang M.D.  Systemic Medical Director of Oncology Services  [facility]

## 2024-06-09 NOTE — PROGRESS NOTES
[facility]   part of [facility]     Hospitalist Progress Note     Valerio Felipe Patient Status:  Inpatient    1970 MRN [id number]   Location [facility] 3SW-A Attending Tiffani Lynn MD   Hosp Day # 4 PCP Ayse Luo DO     Chief Complaint: Back pain     Subjective:     Patient  in glute- R side unable to sit on it.    Objective:    Review of Systems:   A comprehensive review of systems was completed; pertinent positive and negatives stated in subjective.    Vital signs:  Temp:  [97.8 °F (36.6 °C)-98.4 °F (36.9 °C)] 98.3 °F (36.8 °C)  Pulse:  [80-88] 81  Resp:  [18] 18  BP: (112-133)/(59-85) 112/59  SpO2:  [97 %-98 %] 98 %    Physical Exam:    General: No acute distress  Respiratory: No wheezes, no rhonchi  Cardiovascular: S1, S2, regular rate and rhythm  Abdomen: Soft, Non-tender, non-distended, positive bowel sounds  Neuro: No new focal deficits.   Extremities: No edema      Diagnostic Data:    Labs:  Recent Labs   Lab 24  0505   WBC 12.3* 11.2*   HGB 11.2* 10.1*   MCV 93.8 96.6   .0 217.0       Recent Labs   Lab 24  0459   * 106*   BUN 26* 17   CREATSERUM 1.55* 0.94   CA 8.9 8.9    140   K 4.3 4.5    109   CO2 25.0 25.0       Estimated Creatinine Clearance: 83 mL/min (based on SCr of 0.94 mg/dL).    No results for input(s): \"TROP\", \"TROPHS\", \"CK\" in the last 168 hours.    No results for input(s): \"PTP\", \"INR\" in the last 168 hours.               Microbiology    No results found for this visit on 24.      Imaging: Reviewed in Epic.    Medications:    Dasatinib  100 mg Oral Daily    methylPREDNISolone  4 mg Oral Once    Followed by    methylPREDNISolone  4 mg Oral Once    Followed by    methylPREDNISolone  8 mg Oral nightly    [START ON 6/10/2024] methylPREDNISolone  4 mg Oral TID CC    [START ON 6/10/2024] methylPREDNISolone  8 mg Oral nightly    [START ON 2024] methylPREDNISolone  4 mg Oral TID CC and HS     [START ON 2024] methylPREDNISolone  4 mg Oral Daily with breakfast    [START ON 2024] methylPREDNISolone  4 mg Oral Daily with lunch    [START ON 2024] methylPREDNISolone  4 mg Oral nightly    [START ON 2024] methylPREDNISolone  4 mg Oral Daily with breakfast    [START ON 2024] methylPREDNISolone  4 mg Oral Nightly    [START ON 2024] methylPREDNISolone  4 mg Oral Daily with breakfast    docusate sodium  100 mg Oral BID    atorvastatin  10 mg Oral Daily    labetalol  200 mg Oral 2x Daily(Beta Blocker)    heparin  5,000 Units Subcutaneous Q8H KEYSHA    methocarbamol  500 mg Oral Q12H       Assessment & Plan:      Expand All Collapse All       [facility]            Valerio Felipe Patient Status:  Emergency    1970 MRN [id number]   Location [facility] EMERGENCY DEPARTMENT Attending Demetrio Poe DO   Hosp Day # 0 PCP Ayse Luo DO      Chief Complaint: Back Pain        Subjective:  History of Present Illness:      Valerio Felipe is a 53 year old male with past medical history of hypertension presents to the emergency department with low back pain     Patient has low back pain with burning shooting tingling sensation into his right lower extremity.  He has outpatient follow-up scheduled with an orthospine doctor.  Due to intractable symptoms and inability to find comfortable position he presented to the emergency department.  No chest pain or difficulty breathing.  Recently had diverticulitis but abdominal pain has resolved.  No other complaints at this time.              History/Other:  Past Medical History:  Past Medical History       Past Medical History:    Essential hypertension    High blood pressure    High cholesterol    Leukemia (HCC)    Leukemia (HCC)    Personal history of antineoplastic chemotherapy     oral chemo (Sprycel)    Smoking        Past Surgical History:   Past Surgical History         Past Surgical History:   Procedure  Laterality Date    Colonoscopy   07/2016     diverticulosis, polyps (adenoma/hp). repeat 5 years    Colonoscopy N/A 05/17/2021     Procedure: COLONOSCOPY;  Surgeon: Fortino Maier MD;  Location:  ENDOSCOPY    Colonoscopy,biopsy N/A 07/11/2016     Procedure: COLONOSCOPY, POSSIBLE BIOPSY, POSSIBLE POLYPECTOMY 49766;  Surgeon: Fortino Maier MD;  Location: [facility]    Colonoscopy,remv lesn,snare N/A 07/11/2016     Procedure: COLONOSCOPY, POSSIBLE BIOPSY, POSSIBLE POLYPECTOMY 88293;  Surgeon: Fortino Maier MD;  Location: [facility]    Create eardrum opening,gen anesth   10/07/2021    Knee arthroscopy        Lap gastric bypass/hernando-en-y        Other surgical history   2005     Rotator Cuff Repair left side    Other surgical history Right       meniscus         Family History:   Family History         Family History   Problem Relation Age of Onset    Other (COPD [Other]) Father      Heart Attack Father      Stroke Father      Breast Cancer Mother      Diabetes Mother          Social History:    reports that he quit smoking about 5 years ago. His smoking use included cigarettes. He has never used smokeless tobacco. He reports current alcohol use. He reports current drug use. Drug: Cannabis.      Allergies:   Allergies   No Known Allergies        Medications:    Medications Ordered Prior to Encounter             Current Facility-Administered Medications on File Prior to Encounter   Medication Dose Route Frequency Provider Last Rate Last Admin    [COMPLETED] diazepam (Valium) 5 mg/mL injection 5 mg  5 mg Intravenous Once Yobany Soto, DO   5 mg at 05/26/24 1107    [COMPLETED] morphINE PF 4 MG/ML injection 4 mg  4 mg Intravenous Once Yobany Soto, DO   4 mg at 05/26/24 1108    [COMPLETED] ketorolac (Toradol) 15 MG/ML injection 15 mg  15 mg Intravenous Once Yobany Soto, DO   15 mg at 05/26/24 1108    [COMPLETED] dexAMETHasone PF (Decadron) 10 MG/ML injection 10 mg  10 mg Intravenous  Once Yobany Soto DO   10 mg at 24 1108    [COMPLETED] sodium chloride 0.9 % IV bolus 1,000 mL  1,000 mL Intravenous Once Yobany Soto DO   Stopped at 24 1324    [COMPLETED] HYDROmorphone (Dilaudid) 1 MG/ML injection 1 mg  1 mg Intravenous Once Yobany Soto DO   1 mg at 24 1227    [COMPLETED] sodium chloride 0.9 % IV bolus 1,000 mL  1,000 mL Intravenous Once Ariadna Houston MD   Stopped at 24 1928    [COMPLETED] iopamidol 76% (ISOVUE-370) injection for power injector  100 mL Intravenous ONCE PRN Ariadna Houston MD   100 mL at 24 1907    [COMPLETED] morphINE PF 4 MG/ML injection 4 mg  4 mg Intravenous Once Rain Padron, DO   4 mg at 24 0808    [COMPLETED] sodium chloride 0.9 % IV bolus 1,000 mL  1,000 mL Intravenous Once Rain Padron, DO 1,000 mL/hr at 24 0809 1,000 mL at 24 0809    [COMPLETED] HYDROmorphone (Dilaudid) 1 MG/ML injection 1 mg  1 mg Intravenous Once Rain Padron DO   1 mg at 24 0850    [COMPLETED] iopamidol 76% (ISOVUE-370) injection for power injector  100 mL Intravenous ONCE PRN Rain Padron, DO   100 mL at 24 0855    [COMPLETED] cefTRIAXone (Rocephin) 2 g in D5W 100 mL IVPB-ADD  2 g Intravenous Once Rain Padron, DO   Stopped at 24 1100    [COMPLETED] metRONIDAZOLE in sodium chloride 0.79% (Flagyl) 5 mg/mL IVPB premix 500 mg  500 mg Intravenous Once Rain Padron,  mL/hr at 24 1101 500 mg at 24 1101    [] sodium chloride 0.9% infusion   Intravenous Continuous Rain Padron, DO        [COMPLETED] metoclopramide (Reglan) tab 10 mg  10 mg Oral Once Pat Altman MD   10 mg at 24 0758    [COMPLETED] famotidine (Pepcid) tab 20 mg  20 mg Oral Once Pat Altman MD   20 mg at 24 0758    [COMPLETED] ceFAZolin (Ancef) 2 g in D5W 50 mL Duplex container  2 g Intravenous Once Pat Altman MD   3 g at 24 0915             Current Outpatient Medications on File Prior to  Encounter   Medication Sig Dispense Refill    HYDROcodone-acetaminophen (NORCO)  MG Oral Tab Take 1 tablet by mouth every 6 (six) hours as needed for Pain. 40 tablet 0    [] ibuprofen 600 MG Oral Tab Take 1 tablet (600 mg total) by mouth every 8 (eight) hours as needed for Pain or Fever. 30 tablet 0    amphetamine-dextroamphetamine (ADDERALL) 20 MG Oral Tab Take 1 tablet (20 mg total) by mouth 2 (two) times daily. 60 tablet 0    diclofenac 75 MG Oral Tab EC Take 1 tablet (75 mg total) by mouth 2 (two) times daily as needed. 60 tablet 1    losartan 100 MG Oral Tab Take 1 tablet (100 mg total) by mouth daily. 90 tablet 0    spironolactone 50 MG Oral Tab Take 1 tablet (50 mg total) by mouth daily. 90 tablet 0    atorvastatin 10 MG Oral Tab Take 1 tablet (10 mg total) by mouth daily. 90 tablet 0    Dasatinib (SPRYCEL) 100 MG Oral Tab Take 1 tablet by mouth daily.   0    Labetalol HCl 200 MG Oral Tab Take 1 tablet (200 mg total) by mouth 2 (two) times daily. 60 tablet 3    Vitamin B-12 1000 MCG Oral Tab Take 1 tablet (1,000 mcg total) by mouth daily.        Cholecalciferol (VITAMIN D3) 1.25 MG (55153 UT) Oral Cap Take 1 tablet by mouth once a week.        Ascorbic Acid (VITAMIN C) 1000 MG Oral Tab Take 1 tablet (1,000 mg total) by mouth daily.                Review of Systems:   A comprehensive review of systems was completed.    Pertinent positives and negatives noted in the HPI.           Objective:  Physical Exam:    /80   Pulse 87   Temp 97.2 °F (36.2 °C) (Temporal)   Resp 16   Wt 284 lb 6.3 oz (129 kg)   SpO2 98%   BMI 44.54 kg/m²   General: No acute distress, Alert  Respiratory: No rhonchi, no wheezes  Cardiovascular: S1, S2. Regular rate and rhythm  Abdomen: Soft, Non-tender, non-distended, positive bowel sounds  Neuro: No new focal deficits  Extremities: No edema              Results:  Labs:        Labs Last 24 Hours:         Recent Labs   Lab 24  2207   RBC 3.56*   HGB 11.2*   HCT  33.4*   MCV 93.8   MCH 31.5   MCHC 33.5   RDW 13.3   NEPRELIM 8.31*   WBC 12.3*   .0             Recent Labs   Lab 06/04/24  2208   *   BUN 26*   CREATSERUM 1.55*   EGFRCR 53*   CA 8.9      K 4.3      CO2 25.0               Lab Results   Component Value Date     INR 1.02 07/27/2022         No results for input(s): \"TROP\", \"TROPHS\", \"CK\" in the last 168 hours.     No results for input(s): \"TROP\", \"PBNP\" in the last 168 hours.     No results for input(s): \"PCT\" in the last 168 hours.     Imaging: Imaging data reviewed in Epic.           Assessment & Plan:  #Intractable Lumbar radiculopathy  - PRN pain meds   - s/p GINA on 6/6/ no relief   - adjust pain meds on 6/9- added medrol dose pack- appears now to have more piriformis ?     #Acute Kidney Injury  -IVF  -resolved     #Essential hypertension  -Continue Labetalol  -Hold Losartan/Aldactone      #Hyperlipidemia  -Continue home statin     #CML- will need his home meds restarted  -primary oncologist at outside hospital   -PTA: Dasatinib     #History of gastric bypass surgery     #Morbid obesity  -BMI 40.72         Tiffani Lynn MD    Supplementary Documentation:     Quality:  DVT Mechanical Prophylaxis:     Early ambuation  DVT Pharmacologic Prophylaxis   Medication    heparin (Porcine) 5000 UNIT/ML injection 5,000 Units                Code Status: Not on file  Jamil: No urinary catheter in place  Jamil Duration (in days):   Central line:    PAUL: 6/7/2024    Discharge is dependent on: clinical   At this point Mr. Felipe is expected to be discharge to: home    The 21st Century Cures Act makes medical notes like these available to patients in the interest of transparency. Please be advised this is a medical document. Medical documents are intended to carry relevant information, facts as evident, and the clinical opinion of the practitioner. The medical note is intended as peer to peer communication and may appear blunt or direct. It is written in  medical language and may contain abbreviations or verbiage that are unfamiliar.

## 2024-06-09 NOTE — PLAN OF CARE
Caclso6i is alert and oriented x4. Numbness and tingling to RLE, biggest complaint to back of thigh / buttock. Pulses bilateral +2. Strength and mobility in lower extremities limited d/t pain, increased weakness to RLE. Pain last rated a 8/10. Patient ambulates independently. IS and ankle pumps encouraged. Belongings within reach. Encouraged to call for any needs.

## 2024-06-10 ENCOUNTER — TELEPHONE (OUTPATIENT)
Dept: ORTHOPEDICS CLINIC | Facility: CLINIC | Age: 54
End: 2024-06-10

## 2024-06-10 ENCOUNTER — TELEPHONE (OUTPATIENT)
Facility: CLINIC | Age: 54
End: 2024-06-10

## 2024-06-10 VITALS
WEIGHT: 260 LBS | RESPIRATION RATE: 18 BRPM | DIASTOLIC BLOOD PRESSURE: 73 MMHG | HEART RATE: 80 BPM | SYSTOLIC BLOOD PRESSURE: 137 MMHG | TEMPERATURE: 98 F | BODY MASS INDEX: 41 KG/M2 | OXYGEN SATURATION: 94 %

## 2024-06-10 DIAGNOSIS — M51.26 LUMBAR DISC HERNIATION: Primary | ICD-10-CM

## 2024-06-10 RX ORDER — HYDROCODONE BITARTRATE AND ACETAMINOPHEN 10; 325 MG/1; MG/1
2 TABLET ORAL EVERY 6 HOURS PRN
Qty: 20 TABLET | Refills: 0 | Status: SHIPPED | OUTPATIENT
Start: 2024-06-10 | End: 2024-06-13

## 2024-06-10 RX ORDER — METHOCARBAMOL 500 MG/1
500 TABLET, FILM COATED ORAL EVERY 12 HOURS
Qty: 30 TABLET | Refills: 0 | Status: SHIPPED | OUTPATIENT
Start: 2024-06-10

## 2024-06-10 RX ORDER — METHYLPREDNISOLONE 4 MG/1
TABLET ORAL
Qty: 21 TABLET | Refills: 0 | Status: SHIPPED | OUTPATIENT
Start: 2024-06-10

## 2024-06-10 NOTE — PROGRESS NOTES
Preoperative clearance for surgery on 6/19/24    Per [facility] perioperative Risk calculation patient has 0.1% risk of MI or cardiac risk intraoperatively or up to 30 days post op.      Pt is medically optimized for upcoming sugery.

## 2024-06-10 NOTE — DISCHARGE SUMMARY
[facility]     Valerio Felipe Patient Status:  Inpatient    1970 MRN [id number]   Location [facility] 3SW-A Attending No att. providers found   Hosp Day # 5 PCP Ayse Luo DO     Date of Admission: 2024  Date of Discharge:  6/10/2024     Discharge Disposition: Home or Self Care    Discharge Diagnosis:  #Intractable Lumbar radiculopathy  - PRN pain meds   - s/p GINA on / no relief   - adjust pain meds on - added medrol dose pack- appears now to have more piriformis ?     #Acute Kidney Injury  -IVF  -resolved     #Essential hypertension  -Continue Labetalol  -Hold Losartan/Aldactone      #Hyperlipidemia  -Continue home statin     #CML- will need his home meds restarted  -primary oncologist at outside hospital   -PTA: Dasatinib     #History of gastric bypass surgery    #Morbid obesity     History of Present Illness:   kelley Felipe is a 53 year old male with past medical history of hypertension presents to the emergency department with low back pain     Patient has low back pain with burning shooting tingling sensation into his right lower extremity.  He has outpatient follow-up scheduled with an orthospine doctor.  Due to intractable symptoms and inability to find comfortable position he presented to the emergency department.  No chest pain or difficulty breathing.  Recently had diverticulitis but abdominal pain has resolved.  No other complaints at this time.    Brief Synopsis:   Pt was admitted, he was managed conservatively with minimal relief. S/p GINA by pain service.  PT continued pain during hosptial stay- DC home with oral pain meds and plan for surgery with Spine Surgery on 24.  Contacted his oncologist office at [facility] on 6/10/24 for recs w regards to holding disatinib prior to surgery  Spoke to Dr YULIA Davalos nurse Trisha on 2024 recommend holding dasatinib 5 days prior to surgery can be resumed the day after- keeping in mind it can  cause platelet dysfunction. I informed Dr Rebolledo and called Mr Felipe on 6/11/24 at 12:25 pm to inform him he verbalizes understanding.     Lace+ Score: 83  59-90 High Risk  29-58 Medium Risk  0-28   Low Risk       TCM Follow-Up Recommendation:  LACE > 58: High Risk of readmission after discharge from the hospital.      Procedures during hospitalization:   GINA     Incidental or significant findings and recommendations (brief descriptions):  MRI  Multilevel degenerative changes of the spine, as detailed above.  This is most notable at L5-S1 where there is a combination of diffuse disc bulge and superimposed right paracentral focal disc extrusion, increased in size compared to 2012.  This results   in moderate central canal stenosis, additionally with impingement of the descending right S1 nerve root.  Facet arthrosis contributes to severe narrowing of the right neural foramen.  Correlate with radicular symptoms.     Lab/Test results pending at Discharge:   No     Consultants:  Neurosurgery, pain service     Discharge Medication List:     Discharge Medications        START taking these medications        Instructions Prescription details   methocarbamol 500 MG Tabs  Commonly known as: Robaxin      Take 1 tablet (500 mg total) by mouth every 12 (twelve) hours.   Quantity: 30 tablet  Refills: 0     methylPREDNISolone 4 MG Tbpk  Commonly known as: Medrol Dosepak      Take as directed on dose pack instructions- start at DAY TWO   Quantity: 21 tablet  Refills: 0            CHANGE how you take these medications        Instructions Prescription details   HYDROcodone-acetaminophen  MG Tabs  Commonly known as: [place]  What changed: Another medication with the same name was added. Make sure you understand how and when to take each.      Take 1 tablet by mouth every 6 (six) hours as needed for Pain.   Quantity: 40 tablet  Refills: 0     HYDROcodone-acetaminophen  MG Tabs  Commonly known as: [place]  What changed: You were  already taking a medication with the same name, and this prescription was added. Make sure you understand how and when to take each.      Take 2 tablets by mouth every 6 (six) hours as needed.   Quantity: 20 tablet  Refills: 0            CONTINUE taking these medications        Instructions Prescription details   amphetamine-dextroamphetamine 20 MG Tabs  Commonly known as: Adderall      Take 1 tablet (20 mg total) by mouth 2 (two) times daily.   Stop taking on: June 12, 2024  Quantity: 60 tablet  Refills: 0     atorvastatin 10 MG Tabs  Commonly known as: Lipitor      Take 1 tablet (10 mg total) by mouth daily.   Quantity: 90 tablet  Refills: 0     cyanocobalamin 1000 MCG Tabs  Commonly known as: Vitamin B12      Take 1 tablet (1,000 mcg total) by mouth daily.   Refills: 0     labetalol 200 MG Tabs  Commonly known as: Normodyne      Take 1 tablet (200 mg total) by mouth 2 (two) times daily.   Quantity: 60 tablet  Refills: 3     losartan 100 MG Tabs  Commonly known as: Cozaar      Take 1 tablet (100 mg total) by mouth daily.   Quantity: 90 tablet  Refills: 0     spironolactone 50 MG Tabs  Commonly known as: Aldactone      Take 1 tablet (50 mg total) by mouth daily.   Quantity: 90 tablet  Refills: 0     Sprycel 100 MG Tabs  Generic drug: Dasatinib      Take 1 tablet by mouth daily.   Refills: 0     vitamin C 1000 MG Tabs      Take 1 tablet (1,000 mg total) by mouth daily.   Refills: 0     Vitamin D3 1.25 MG (18107 UT) Caps      Take 1 tablet by mouth once a week.   Refills: 0            STOP taking these medications      diclofenac 75 MG Tbec  Commonly known as: Voltaren        ibuprofen 600 MG Tabs  Commonly known as: Motrin                  Where to Get Your Medications        These medications were sent to [id number] - [place], [place] - [address] AT [facility] [address], [phone], [phone]  [address], [facility] [address]      Phone: [phone]   HYDROcodone-acetaminophen  MG  Tabs  methocarbamol 500 MG Tabs  methylPREDNISolone 4 MG Tbpk         [place]    Follow-up appointment:   Castro Reed MD  [address] [address]  [phone]    Schedule an appointment as soon as possible for a visit  As needed    Appointments for Next 30 Days 6/10/2024 - 7/10/2024      None            Vital signs:  Temp:  [97.6 °F (36.4 °C)-98.3 °F (36.8 °C)] 98.3 °F (36.8 °C)  Pulse:  [78-86] 80  Resp:  [16-18] 18  BP: (136-142)/(65-80) 137/73  SpO2:  [94 %-97 %] 94 %    Physical Exam:    General: No acute distress   Lungs: clear to auscultation  Cardiovascular: S1, S2  Abdomen: Soft      -----------------------------------------------------------------------------------------------  PATIENT DISCHARGE INSTRUCTIONS: See electronic chart    Tiffani Lynn MD    Total time spent on discharge plannin minutes     The  Cures Act makes medical notes like these available to patients in the interest of transparency. Please be advised this is a medical document. Medical documents are intended to carry relevant information, facts as evident, and the clinical opinion of the practitioner. The medical note is intended as peer to peer communication and may appear blunt or direct. It is written in medical language and may contain abbreviations or verbiage that are unfamiliar.

## 2024-06-10 NOTE — TELEPHONE ENCOUNTER
SURGERY SCHEDULING SHEET    Valerio Felipe  12/18/1970  [id number]    Procedure: Right L5-S1 microdicectomy (87896)    Diagnosis: Lumbar disc herniation     Anesthesia: General    Length of Surgery: 1.5 hrs    Disposition: Outpatient    Assist: VINI Brennan    OR Table: Rasheed    Position: Prone    Implant:  None    C-Arm: Yes    Special Equipment: None    Neuromonitoring: None    Pre-op Testing: PER ANESTHESIA GUIDELINES    Clearance: OTHER:  Hospitalist vs PCP    Post op: 2 weeks post op    Home health: Yes    Mark Rebolledo MD  [facility] Orthopedic Surgery  Phone: [phone]  Fax: [phone]

## 2024-06-10 NOTE — PLAN OF CARE
ED admit 6/4 now POD 4 GINA, Pt is AAOX4, VSS, room air, up ad hernandez, voiding freely to restroom, TELE, pain persists per Pt report, pain and numbness to Rt leg / thigh, waiting on MD to round and make plan, started on Medrol dose, PO meds for pain, see MAR, Pt doing well, all needs met, all safety measures in place, call light within reach, will CTM.

## 2024-06-10 NOTE — PAYOR COMM NOTE
--------------  DISCHARGE REVIEW    Payor: [facility] CORE/NAVIGATE  Subscriber #:  [id number]  Authorization Number: [id number]    Admit date: 6/5/24  Admit time:   2:24 AM  Discharge Date: 6/10/2024 11:29 AM     Admitting Physician: Suha Loyola DO  Attending Physician:  No att. providers found  Primary Care Physician: Ayse Luo DO       Discharge Summary Notes    No notes of this type exist for this encounter.

## 2024-06-10 NOTE — TELEPHONE ENCOUNTER
Date of Surgery: 6/19/24       Post Op Appt: 7/8/24 @ 1000    Case ID: [id number]    Notes: [facility]: YES    Navigator Appt: 6/12/24 @ 0945      SURGERY SCHEDULING SHEET     Valerio Felipe  12/18/1970  [id number]     Procedure: Right L5-S1 microdicectomy ([id number])     Diagnosis: Lumbar disc herniation      Anesthesia: General     Length of Surgery: 1.5 hrs     Disposition: Outpatient     Assist: VINI Brennan     OR Table: Rasheed     Position: Prone     Implant:  None     C-Arm: Yes     Special Equipment: None     Neuromonitoring: None     Pre-op Testing: PER ANESTHESIA GUIDELINES     Clearance: OTHER:  Hospitalist vs PCP     Post op: 2 weeks post op     Home health: Yes     Mark Rebolledo MD  [facility] Orthopedic Surgery  Phone: [phone]  Fax: [phone]

## 2024-06-10 NOTE — PLAN OF CARE
NURSING DISCHARGE NOTE    Discharged Home via Wheelchair.  Accompanied by Spouse  Belongings Taken by patient/family.    AVS printed and discussed, IV removed, Rx's E-scribed, reminded to  medicine at pharmacy. Pt ready to discharge home.

## 2024-06-11 ENCOUNTER — PATIENT OUTREACH (OUTPATIENT)
Dept: CASE MANAGEMENT | Age: 54
End: 2024-06-11

## 2024-06-11 ENCOUNTER — TELEPHONE (OUTPATIENT)
Dept: FAMILY MEDICINE CLINIC | Facility: CLINIC | Age: 54
End: 2024-06-11

## 2024-06-11 ENCOUNTER — LABORATORY ENCOUNTER (OUTPATIENT)
Dept: LAB | Age: 54
End: 2024-06-11
Attending: STUDENT IN AN ORGANIZED HEALTH CARE EDUCATION/TRAINING PROGRAM
Payer: COMMERCIAL

## 2024-06-11 DIAGNOSIS — M51.26 LUMBAR DISC HERNIATION: ICD-10-CM

## 2024-06-11 DIAGNOSIS — M54.9 INTRACTABLE BACK PAIN: Primary | ICD-10-CM

## 2024-06-11 LAB
ANTIBODY SCREEN: NEGATIVE
APTT PPP: 26.3 SECONDS (ref 23–36)
INR BLD: 0.99 (ref 0.8–1.2)
PROTHROMBIN TIME: 13.1 SECONDS (ref 11.6–14.8)
RH BLOOD TYPE: POSITIVE

## 2024-06-11 PROCEDURE — 86900 BLOOD TYPING SEROLOGIC ABO: CPT

## 2024-06-11 PROCEDURE — 1111F DSCHRG MED/CURRENT MED MERGE: CPT

## 2024-06-11 PROCEDURE — 36415 COLL VENOUS BLD VENIPUNCTURE: CPT

## 2024-06-11 PROCEDURE — 86901 BLOOD TYPING SEROLOGIC RH(D): CPT

## 2024-06-11 PROCEDURE — 85610 PROTHROMBIN TIME: CPT

## 2024-06-11 PROCEDURE — 86850 RBC ANTIBODY SCREEN: CPT

## 2024-06-11 PROCEDURE — 85730 THROMBOPLASTIN TIME PARTIAL: CPT

## 2024-06-11 PROCEDURE — 87081 CULTURE SCREEN ONLY: CPT

## 2024-06-11 NOTE — PROGRESS NOTES
Initial Post Discharge Follow Up   Discharge Date: 6/10/24  Contact Date: 6/11/2024    Consent Verification:  Assessment Completed With: Patient  HIPAA Verified?  Yes    Discharge Dx:   Intractable back pain        General:   How have you been since your discharge from the hospital?  I got released yesterday. I'm having surgery on my back next week. I'm still in quite a bit of pain but the meds are keeping it under control. Everything is basically the same.   Do you have any pain since discharge?  Yes  Where: right buttock to leg.    Rating on pain scale 1-10, 10 being the worst pain you have ever experienced, what is current pain: 7  Alleviating factors: none  Aggravating factors: none  Is the pain manageable at home? Yes  How well was your pain managed while in the hospital?   On a scale of 1-5   1- Very Poor and 5- Very well   Very Well  When you were leaving the hospital were your discharge instructions reviewed with you? Yes  How well were your discharge instructions explained to you?   On a scale of 1-5   1- Very Poor and 5- Very well   Very Well  Do you have any questions about your discharge instructions?  No  Before leaving the hospital was your diagnoses explained to you? Yes  Do you have any questions about your diagnoses? No  Are you able to perform normal daily activities of living as you have prior to your hospital stay (dressing, bathing, ambulating to the bathroom, etc)? yes  (NCM) Was patient given a different diet per AVS? no      Medications:   Current Outpatient Medications   Medication Sig Dispense Refill    HYDROcodone-acetaminophen  MG Oral Tab Take 2 tablets by mouth every 6 (six) hours as needed. 20 tablet 0    methocarbamol 500 MG Oral Tab Take 1 tablet (500 mg total) by mouth every 12 (twelve) hours. 30 tablet 0    methylPREDNISolone 4 MG Oral Tablet Therapy Pack Take as directed on dose pack instructions- start at DAY TWO 21 tablet 0    HYDROcodone-acetaminophen (NORCO)  MG  Oral Tab Take 1 tablet by mouth every 6 (six) hours as needed for Pain. 40 tablet 0    amphetamine-dextroamphetamine (ADDERALL) 20 MG Oral Tab Take 1 tablet (20 mg total) by mouth 2 (two) times daily. 60 tablet 0    losartan 100 MG Oral Tab Take 1 tablet (100 mg total) by mouth daily. 90 tablet 0    spironolactone 50 MG Oral Tab Take 1 tablet (50 mg total) by mouth daily. 90 tablet 0    atorvastatin 10 MG Oral Tab Take 1 tablet (10 mg total) by mouth daily. 90 tablet 0    Dasatinib (SPRYCEL) 100 MG Oral Tab Take 1 tablet by mouth daily.  0    Labetalol HCl 200 MG Oral Tab Take 1 tablet (200 mg total) by mouth 2 (two) times daily. 60 tablet 3    Vitamin B-12 1000 MCG Oral Tab Take 1 tablet (1,000 mcg total) by mouth daily.      Cholecalciferol (VITAMIN D3) 1.25 MG (83764 UT) Oral Cap Take 1 tablet by mouth once a week.      Ascorbic Acid (VITAMIN C) 1000 MG Oral Tab Take 1 tablet (1,000 mg total) by mouth daily.       Were there any changes to your current medication(s) noted on the AVS? Yes  If so, were these medication changes discussed with you prior to leaving the hospital? Yes  If a new medication was prescribed:    Was the new medication's purpose & side effects reviewed? Yes  Do you have any questions about your new medication? No  Did you  your discharge medications when you left the hospital? Yes  Let's go over your medications together to make sure we are not missing anything. Medications Reviewed  Are there any reasons that keep you from taking your medication as prescribed? No  Are you having any concerns with constipation? No, pt states that constipation has resolved.       Discharge medications reviewed/discussed/and reconciled against outpatient medications with patient.  Any changes or updates to medications sent to PCP.  Patient Acknowledged     Referrals/orders at D/C:  Referrals/orders placed at D/C? no    DME ordered at D/C? No      Discharge orders, AVS reviewed and discussed with patient.  Any changes or updates to orders sent to PCP.  Patient Acknowledged      SDOH:   Transportation Needs: No Transportation Needs (6/5/2024)    Transportation Needs     Lack of Transportation: No     Car Seat: Not on file     Financial Resource Strain: Low Risk  (6/11/2024)    Financial Resource Strain     Difficulty of Paying Living Expenses: Not hard at all     Med Affordability: No       Follow up appointments:      Your appointments       Date & Time Appointment Department ([place])    Jul 16, 2024 10:30 AM CDT Follow Up Visit with Ayse Luo DO [facility], [address] [place] [address] ([facility] [address])              [address]  [address] [place] [id number] [phone]            TCC  Was TCC ordered: No    PCP (If no TCC appointment)  Does patient already have a PCP appointment scheduled? Yes, pt has an appt on 7/16/24 and declined a sooner one stating that he is having back surgery next week. NCM sent TE to PCP office.       Specialist    Does the patient have any other follow up appointment(s) needing to be scheduled? No, pt already has follow up for back surgery on 6/19/24      Is there any reason as to why you cannot make your appointment(s)?  No     Needs post D/C:   Now that you are home, are there any needs or concerns you need addressed before your next visit with your PCP?  (DME, meds, questions, etc.): No    Interventions by NCM:   NCM reviewed discharge instructions and when to seek medical attention with the patient. He states that he still has quite a bit of pain but is having back surgery next week. He denied having any new numbness or weakness and states that the pain is the same. He does not check his bp stating that it's been normal. He denied having any fever, n/v/c/d, sob, lightheadedness, HA or any new or worsening symptoms. Med review completed. He denied having  any questions or concerns at this time.         CCM referral placed:    No    BOOK BY DATE: 6/24/24

## 2024-06-11 NOTE — TELEPHONE ENCOUNTER
RACH, Spoke to patient for HFU today.  Patient has an appt on 7/16/24 and declined a sooner one stating that he will be having back surgery on 6/19/24.  HFU appointment recommended by 6/17/24 as patient is a High risk for readmission and no later than 6/24/24.

## 2024-06-11 NOTE — TELEPHONE ENCOUNTER
Called and left a message for Valerio in regards to his upcoming surgery to discuss the surgical protocol and schedule his post op appt.    No

## 2024-06-11 NOTE — TELEPHONE ENCOUNTER
Spoke with Valerio in regards to his upcoming surgery. I confirmed that surgery will take place at [facility]. I also discussed the surgical protocol and scheduled his post op and spine navigator appt. He states understanding with no questions or concerns. Advised to call the office back if he has any questions or concerns.

## 2024-06-12 ENCOUNTER — NURSE ONLY (OUTPATIENT)
Dept: SURGERY | Facility: CLINIC | Age: 54
End: 2024-06-12
Payer: COMMERCIAL

## 2024-06-12 DIAGNOSIS — Z71.9 ENCOUNTER FOR EDUCATION: Primary | ICD-10-CM

## 2024-06-12 NOTE — PROGRESS NOTES
[facility] [place]    Sepsis Evaluation Progress Note    Date of Service: 05/02/2018    I was called to see Keyon Farias due to abnormal vital signs triggering the Sepsis SIRS screening alert. He is known to have an infection.     Physical Exam    Vital Signs:  Temp: 100.7  F (38.2  C) Temp src: Axillary BP: 92/50   Heart Rate: 120 Resp: 20 SpO2: 96 % O2 Device: None (Room air)      Lab:  Lactic Acid   Date Value Ref Range Status   05/01/2018 3.3 (H) 0.4 - 2.0 mmol/L Final       The patient is at baseline mental status.    The rest of their physical exam is significant for:   Physical Exam   Constitutional: He has a sickly appearance. No distress.   HENT:   Head: Normocephalic.   Eyes: Pupils are equal, round, and reactive to light.   Cardiovascular: Normal heart sounds and normal pulses.  Tachycardia present.  Exam reveals no gallop, no distant heart sounds and no friction rub.    Pulmonary/Chest: No accessory muscle usage. No respiratory distress. He has decreased breath sounds in the right lower field and the left lower field.   Abdominal: Soft. Normal appearance and bowel sounds are normal. There is no tenderness.   Neurological: He is alert. He displays weakness and abnormal speech. He exhibits abnormal muscle tone.   Skin: Skin is warm, dry and intact.   Psychiatric: His mood appears not anxious. He is not agitated. He does not exhibit a depressed mood.     Assessment and Plan    The SIRS and exam findings are likely due to   sepsis.     ID: The patient is currently on the following antibiotics:  Anti-infectives (Future)    Start     Dose/Rate Route Frequency Ordered Stop    05/02/18 1000  vancomycin (VANCOCIN) 1,500 mg in sodium chloride 0.9 % 250 mL intermittent infusion      1,500 mg  over 90 Minutes Intravenous EVERY 12 HOURS 05/02/18 0023      05/02/18 0300  piperacillin-tazobactam (ZOSYN) 4.5 g vial to attach to  mL bag      4.5 g  over 30 Minutes Intravenous EVERY 6 HOURS 05/01/18 9195    RN Spine Navigator Education for Valerio     If you have received instructions from your surgeon that are different from those listed below, please follow your physician's instructions.    You are scheduled for a Microdiscectomy with Dr. Rebolledo on 6/19.      Patient Surgical Goals: Decreased pain.     Before Your Surgery    Choose a Care Partner  Patient attended spine navigator visit with care partner.  Care partner is Ana and daughter. Your care partner(s) should be able to provide transportation to and from the hospital. They should be able to help at home for the first week after discharge, including helping you with meals, medication, and dressing changes. It is strongly recommended that someone stays with you for 24 hours after anesthesia if going home the day of surgery.    Clearance Before Surgery  You will need to see your primary care provider within 30 days before surgery. Please make sure this appointment is at least a week before surgery as more testing or doctor visits may be ordered. Presurgical testing may include labs, nasal swab, imaging, EKG.   Make sure that you complete all preadmission testing so that surgery does not get delayed.    Home Environment  Assessed home status: home has stairs, bathroom and bedroom are upstairs, and patient has pets. Suggested arrangements for pet care.  It is recommended that you prepare your home by putting clean sheets on your bed, freezing meals, and putting frequently used items at waist level.  Prevent falls by removing items that could cause you to trip, adding nightlights and adding a nonskid mat in shower.   Assistive devices can be purchased at a medical supply store or online including canes, walkers, toileting devices (commodes, raised toilet seats, toilet paper wiping aid), long handled sponge, shower chair or tub transfer bench, grabber/reacher tool, sock aid, long handled shoehorn, if needed.    Tobacco, Nicotine and Marijuana Use   Not         Current antibiotic coverage is appropriate for source of infection.    Fluid: Fluid bolus ordered. Ordered by hospitalist (500 NS) I ordered an additional 500LR    Lab: Repeat lactic acid ordered for 2 hours from now.    Disposition: The patient will remain on the current unit. We will continue to monitor this patient closely.    Robb Bell NP     applicable    Medications to Stop   For 7-10 days before surgery do not take any blood thinning medications. This includes non-steroidal anti-inflammatories or NSAIDs (Advil, Aleve, Motrin, ibuprofen, naproxen, meloxicam, diclofenac, celecoxib, etc.), aspirin (unless told otherwise by your cardiologist or surgeon), herbal supplements and vitamins (garlic, turmeric, vitamin E, fish oil or krill oil, etc.). You may only take Tylenol or prescribed narcotic medication if needed for pain.   Other medications to stop include: hold ACE/ARBS day of surgery, no adderall the day of surgery    Leading Up to Day of Surgery  Five days before surgery wash with Hibiclens soap after your regular body soap every day. Do not put use Hibiclens on your face, hair or privates. Your last shower should be the night before surgery. and use mupirocin (Bactroban) if prescribed.   One-two business days before surgery, the preadmission testing staff will call you and let you know what time to arrive, where to park, when to take your Tylenol and Gatorade, and will provide any additional instructions.   After 11pm the day before surgery, do not eat or drink anything (including water, gum, or candies) except for Tylenol and Gatorade.  Drink 12 ounces of regular Gatorade (NOT RED) 12 hours prior to your scheduled surgery time. Drink your second 12 ounces of regular Gatorade and take 1000 mg of Tylenol (acetaminophen) 4 hours before your scheduled surgery time.     Items to Bring to the Hospital   Bring insurance card, ID, advanced directive, or medical power of  paperwork, loose fitting clothing, shoes with a back that can accommodate swollen feet, long phone charging cord.   Do not bring jewelry, valuables, or medications.     In the Hospital     Operative Day and Hospital Stay  In the preoperative area, you will change into a gown, have an IV placed in your hand or arm by the nurse, and sign any consent paperwork that is needed for your  procedure. You will speak to the surgeon and anesthesiologist. It is important to tell the doctors and nurses if you have had any significant side effects from pain medications or anesthesia such as a rash or severe nausea.    In the operating room, the anesthesiologist will attach monitors, give you oxygen through a mask, and give you medicine through the IV to fall asleep. After you are sleeping, the breathing tube will be placed. You will wake up on the stretcher.     During the surgery, your care partner can sit in the surgical waiting area. There are TV screens in that area that keep them informed of your progress. If the procedure is at [facility], there is a person who can speak to them about your surgical progress.     In the recovery room, monitors will be attached that check your heart rate, blood pressure and oxygen levels. While you may not remember this part, a nurse is with you and constantly checking on your condition. Medications for pain and nausea will be given if you need them. Your family is not allowed in the recovery room. When you are ready to leave the recovery room, you will go to the same day surgery discharge area. Your family may join you there as you continue to wake up. You will be offered something to eat and drink and be given pain pills if needed. You will get your discharge instructions from the nurse and go home from there.  Preventing surgical complications  It is important to follow all instructions before and after surgery to decrease the risks of surgery and prevent complications.     Blood clots: walk, and do ankle pumps at home  Infection: wash with Hibiclens before surgery, wash your hands, don't touch your incision  Pneumonia: take deep breaths and cough  Nausea/vomiting: start with liquids and small meals and do not take pills on an empty stomach  Constipation: drink water and walk frequently    Tell your nurse if you are experiencing nausea or vomiting as they have  medications to help treat these.      At home     Understanding Pain After Surgery  We will do our best to manage your pain after surgery, but it is not possible to be completely pain-free. There will be operative pain in your back or neck. Pain in the arms or legs may be one of the first things to improve. Numbness, weakness, and tingling should improve over time. However, there can be a temporary increase in symptoms in the first few days due to inflammation from surgery.   Pain medications will be prescribed to take home at discharge. The goal of pain medicine after surgery is to make your pain tolerable, not to make you pain free. We encourage you to use the medication prescribed to you after your surgery, but please take the lowest possible dose to manage your pain. Taking high doses of narcotics can cause side effects. Transition to plain Tylenol when your pain improves. If you are at [facility], your prescriptions can be filled by our pharmacy and brought to you bedside. You may get more continuous pain relief by alternating between medications if you have multiple instead of taking them at the same time. Write down when you have taken a medication as it may be difficult to remember after a few doses.    Post operative medication   Tylenol (acetaminophen): take for pain. Do not take more than 3000 mg - 4000 mg in 24 hours because it can damage your liver.   Narcotics: take for moderate to severe pain. Do not drink alcohol or drive while taking narcotics. Some narcotic medications (Norco, Tylenol #3, Percocet, Vicodin) contain Tylenol (acetaminophen). Make sure to not exceed the maximum dose if you are taking additional Tylenol with these medications.  Muscle relaxers: take for muscle cramping. These can cause drowsiness.    NSAIDS (Advil, Aleve, Motrin, ibuprofen, naproxen, meloxicam, diclofenac, celecoxib, etc.) or aspirin: Do not take these unless your physician says it is ok.   Stool softeners: take  to prevent constipation while you are taking narcotic medications. You can get these over the counter at the pharmacy. You may use laxatives, which are stronger, if needed.    If you believe you will need more of medication your surgeon has prescribed to you, request a refill through your pharmacy or through the refill request in [employer] (log in, go to medications, then select refill request) at least two business days before you run out of medication.     Nonpharmacologic pain management   You may use ice on your incision for 20 minutes every hour to help with pain and swelling. Do not place ice directly on your skin. You can use heat to sooth your muscles but avoid placing heat directly on your incision. Make frequent position changes. You can do gentle stretching while avoiding significant bending or twisting. Use deep breathing techniques and distractions such as TV, music, reading, or games.     Movement restrictions  After surgery, no bending or twisting your neck or back. Do not lift anything over 10lbs (about the weight of a gallon of milk) or lift anything over your shoulders. Avoid pulling or pushing. You may use stairs while holding the handrail.  It is ok to ride in the car but refrain from driving or traveling long distances until cleared to do so by your surgeon. You may not drive while taking narcotics or muscle relaxants.    Post Op Exercise   Walk frequently. Start with walking short distances and increase as you start to feel better. Do ankle pumps (bending at your ankles, bring your feet towards your head then point them towards the ground) 15-20 times every hour when awake to help prevent blood clots.     Your surgeon will let you know at your post operative appointment if you are ready to decrease your movement restrictions and increase your exercise. If you have questions in between appointments about lessening your restrictions, please contact the office.     You and your doctor will discuss  how you are feeling as you heal and decide if outpatient physical therapy or a medical fitness referral is needed.    Caring for your incision  Always wash your hands before touching your incision. Your incision will be closed with sutures under the skin and skin glue or Steri-Strips (thin white bandages) on top of the skin. Do not attempt to peal off Skin glue/Steri-Strips as they will come off on their own. If the incision is closed with sutures or staples on top of the skin, they will be removed at a post op appointment. The incision may be covered with a gauze dressing that can come off after three days. Once the original gauze dressing is removed, we prefer that you leave your incision open to air (without a gauze dressing). If the incision has drainage or is rubbing against your clothes or brace, you may place gauze and medical tape over it. Change the gauze and medical tape daily. Look at your incision daily to check for signs of infection.   You can shower three days after your surgery or sooner if your surgeon allows. We recommend the care partner be present during the first shower for safety. Let soapy water run over the incision, but do not scrub it or spray it directly. Gently pat it dry after with a clean towel. Do not apply any creams or lotions to the incision. Do not soak in a tub, pool, or any body of water until your incision is fully healed.    Signs of Infection   Check your incision daily for swelling, redness, drainage, pus, bad smell, or opening of the incision.     When to Call for Assistance  Call the Ortho Spine Office ([phone] Option #3) if you experience signs of infection, opening of the incision, continuous nausea or vomiting, poor pain control despite using the pain medication as directed, a sudden increase in pain, temperature over 101F, difficulty swallowing, leg swelling, or with any concerns, unanswered questions, or new problems, such as new numbness/weakness/tingling.  Call  911 or go to the nearest emergency room if you experience chest pain, difficulty breathing, loss of bowel or bladder control, extreme drowsiness, or any other life-threatening situation.     Follow-up Plan   Appointments with Dr. Rebolledo or Clarissa at 2, 6 and 12 weeks    Answered questions regarding: Call Dr. Rebolledo or PCP for additional pain medications before surgery, verify activity with Dr. Rebolledo     You can contact the RN Spine Navigator at [phone] or [email or web address] with additional questions or feedback on your care. It may take several business days to receive a reply so please do not call the RN spine navigator for refills or for emergencies.    Spine navigator spent 25 minutes conducting a virtual visit to provide education. Thank you for letting the RN Spine Navigator participate in your care.    Beverly VALENTINE RN

## 2024-06-13 DIAGNOSIS — M54.9 INTRACTABLE BACK PAIN: ICD-10-CM

## 2024-06-13 RX ORDER — HYDROCODONE BITARTRATE AND ACETAMINOPHEN 10; 325 MG/1; MG/1
2 TABLET ORAL EVERY 6 HOURS PRN
Qty: 80 TABLET | Refills: 0 | Status: SHIPPED | OUTPATIENT
Start: 2024-06-13

## 2024-06-13 NOTE — TELEPHONE ENCOUNTER
A refill request was received for:  Requested Prescriptions     Pending Prescriptions Disp Refills    HYDROcodone-acetaminophen  MG Oral Tab 20 tablet 0     Sig: Take 2 tablets by mouth every 6 (six) hours as needed.       Last refill date:   5/29/2024    Last office visit: 6/10/2024    Patient comment: I was only prescribed 20 tablets. I am taking 2 tsblets every 6 hrs for my pain. I have surgery on Wednesday 6/19 which i need 2 every 6 hours until then and will need this as well for after the surgery, csn uou please refill accordingly?     Follow up due:  Future Appointments   Date Time Provider Department Center   7/8/2024 10:00 AM Mark Rebolledo MD [facility]   7/16/2024 10:30 AM Ayse Luo DO [facility] [address]

## 2024-06-14 ENCOUNTER — TELEPHONE (OUTPATIENT)
Dept: FAMILY MEDICINE CLINIC | Facility: CLINIC | Age: 54
End: 2024-06-14

## 2024-06-14 DIAGNOSIS — F98.8 ATTENTION DEFICIT DISORDER (ADD) IN ADULT: ICD-10-CM

## 2024-06-14 RX ORDER — DEXTROAMPHETAMINE SACCHARATE, AMPHETAMINE ASPARTATE, DEXTROAMPHETAMINE SULFATE AND AMPHETAMINE SULFATE 5; 5; 5; 5 MG/1; MG/1; MG/1; MG/1
20 TABLET ORAL 2 TIMES DAILY
Qty: 60 TABLET | Refills: 0 | Status: SHIPPED | OUTPATIENT
Start: 2024-07-13 | End: 2024-08-12

## 2024-06-14 RX ORDER — DEXTROAMPHETAMINE SACCHARATE, AMPHETAMINE ASPARTATE, DEXTROAMPHETAMINE SULFATE AND AMPHETAMINE SULFATE 5; 5; 5; 5 MG/1; MG/1; MG/1; MG/1
20 TABLET ORAL 2 TIMES DAILY
Qty: 60 TABLET | Refills: 0 | Status: SHIPPED | OUTPATIENT
Start: 2024-08-11 | End: 2024-09-10

## 2024-06-14 RX ORDER — DEXTROAMPHETAMINE SACCHARATE, AMPHETAMINE ASPARTATE, DEXTROAMPHETAMINE SULFATE AND AMPHETAMINE SULFATE 5; 5; 5; 5 MG/1; MG/1; MG/1; MG/1
20 TABLET ORAL 2 TIMES DAILY
Qty: 60 TABLET | Refills: 0 | Status: SHIPPED | OUTPATIENT
Start: 2024-06-14 | End: 2024-07-14

## 2024-06-14 NOTE — TELEPHONE ENCOUNTER
During pre-op screening process pt identified as being at high risk for for obstructive sleep apnea.

## 2024-06-14 NOTE — TELEPHONE ENCOUNTER
Approve/deny Adderall  Pt had scripts on file at pharmacy but it looks like they were cancelled.  Last OV 5/29/24  Pt has OV scheduled 7/16/24 with Dr Luo

## 2024-06-16 NOTE — DISCHARGE INSTRUCTIONS
Sometimes managing your health at home requires assistance.  The [facility] team has recognized your [facility].  They can be reached by phone at [phone].  The fax number for your reference is [phone].  A representative from the home health agency will contact you or your family to schedule your first visit.      Spine Surgery Postoperative Instructions    Wound / Dressing Care:    Before changing your dressing, wash your hands (or ask your helper to wash their hands for 20 seconds). Do not apply creams, solutions, or ointments to the incision.     You may remove the dressing 3 days after surgery, if there is no further drainage, and shower. If there is drainage, cover the wound with new dressing and wait until drainage is no longer present.    Apply sterile dressing to wound until 7 days after surgery, then you may leave wound open to air if there is no drainage.    Check the incision for increased warmth, redness, swelling, unexplained increase in pain, change in the drainage, or persistent drainage that is not decreasing. Call Dr. Rebolledo's office [phone] if there are changes or concerns.     If you have Steri strips, please leave them on until they are loose and almost falling off. In this case, you may then gently lift off the Steri strips.          Showering:   Do no apply water direct over the wound. It's ok to let water run over the incision. Pat the incision dry with a clean towel and apply new dressing if less than 7 days.    Do not bath, swim, or soak in water until cleared by your surgeon.        Medications:  Please resume your pre-hospital medications unless otherwise instructed. If you usually take blood thinners, you will be given instructions about when to resume them.      Please inform your primary care physician about your admission to the hospital and if there has been a change in your usual medications, while you  were hospitalized.       Managing pain:  You may have some ups and downs with your pain. You may be encouraged if you notice the gradual improvement in the pain as the days go by. You may be able to take the edge off the pain but not stop all your pain, however. Pain is part of the healing process after injury and surgery.      Please follow the plan below to help control your pain and reduce the amount of narcotics you require.   - Take 1000 mg of Acetaminophen (Tylenol) three times a day scheduled for first 5 days.  Do not exceed more than 3,000 mg in a 24 hour period or mix with other medications that contain acetaminophen. Take Tylenol as needed if no longer requiring supplemental narcotics  - Supplement with Oxycodone 5-10mg every 4 hours as needed  - For muscle spasm type pain take muscle relaxant every 8 hours as needed    As your pain improves, please decrease the amount of pain medication (by taking fewer tablets and/or increasing the time between doses). Do not increase the dose once you have dropped down to a lesser amount.     Hold your pain medication if you experience over sedation (sleeping too much), slurred speech, slow breathing, or hallucinations. If these symptoms occur, you will need to get advice on how and when it is safe to resume your pain medication. Please call for advice.     Please do not drink alcohol, drive, or operate heavy machinery while taking your pain or anti spasm medication.     For surgery related pain medication refills, if needed, please call the spine clinic directly [phone]  (please leave a message for call back) or your usual pain prescribing clinician.    Constipation:   Pain medication often causes constipation.    Try standing and moving for 1 minute each hour and work up to walking 30 minutes a day.    Drink fluids (32-64 ounces every day) unless patient has cardiac history    Minimize narcotic use    Use natural laxatives such as prune juice, but avoid coffee  or caffeinated products that may disturb sleep cycle    Have high fiber diet, have smaller meals throughout the day    Take Miralax mixed with water or juice BID and Senna-S nightly until regular BM. Hold for loose, frequent, or water stools    If no BM, take Senna-s twice a day       Smoking:   Failure of fusion is as high as 65% in smokers and nicotine users. Therefore, spine patients should not smoke or use nicotine for 6 months after surgery. This is your time to quit.      Activity:   Walking is encouraged.     Avoid heavy lifting (no more than 10 pounds). Do bend at the waist to lift anything. Avoid extreme twisting.    You may not drive a car until cleared to do so by the spine team. Please wear a seat belt.      Sexual Activity:   After 2 weeks, when comfortable and approved by your surgeon. Stop if causing pain.     When should you call the office: Call if  You have increased drainage and/or odor from you wound.     You have increased redness/swelling at the incision site or unexplained incisional pain.     You have a fever of greater than 101 degrees that lasts for several hours. Keep in mind that elevated temperature is common within the first several days after surgery. If available, take Tylenol and do deep breathing and coughing to reduce the temperature. If the fever persists after 5 days from surgery, then contact your surgeon.    You have new or unfamiliar pain or weakness in your arms or legs.     You have loss of control of urination or bowel movements, pain or numbness in the rectal, vaginal, or scrotal area.     Constipation is very common especially when taking pain medications. If stool softeners, laxatives, and other treatments do not work, contact your PCP or surgeon.    You have new tenderness in your calf, redness or discoloration of the leg, new shortness of breath, cough up blood, or have chest pain. These may be signs of a blood clot.     For life threatening emergency including  difficulty breathing or chest pain, please call 911.     Follow-up:  If you have questions regarding your appointment or if an appointment has not been made, please speak with your surgeon's staff [phone].

## 2024-06-19 ENCOUNTER — ANESTHESIA (OUTPATIENT)
Dept: SURGERY | Facility: HOSPITAL | Age: 54
End: 2024-06-19

## 2024-06-19 ENCOUNTER — HOSPITAL ENCOUNTER (OUTPATIENT)
Facility: HOSPITAL | Age: 54
Setting detail: HOSPITAL OUTPATIENT SURGERY
Discharge: HOME HEALTH | End: 2024-06-19
Attending: STUDENT IN AN ORGANIZED HEALTH CARE EDUCATION/TRAINING PROGRAM | Admitting: STUDENT IN AN ORGANIZED HEALTH CARE EDUCATION/TRAINING PROGRAM

## 2024-06-19 ENCOUNTER — APPOINTMENT (OUTPATIENT)
Dept: GENERAL RADIOLOGY | Facility: HOSPITAL | Age: 54
End: 2024-06-19
Attending: STUDENT IN AN ORGANIZED HEALTH CARE EDUCATION/TRAINING PROGRAM

## 2024-06-19 ENCOUNTER — ANESTHESIA EVENT (OUTPATIENT)
Dept: SURGERY | Facility: HOSPITAL | Age: 54
End: 2024-06-19

## 2024-06-19 VITALS
SYSTOLIC BLOOD PRESSURE: 127 MMHG | RESPIRATION RATE: 18 BRPM | OXYGEN SATURATION: 96 % | BODY MASS INDEX: 42.85 KG/M2 | DIASTOLIC BLOOD PRESSURE: 86 MMHG | WEIGHT: 273 LBS | TEMPERATURE: 97 F | HEART RATE: 98 BPM | HEIGHT: 67 IN

## 2024-06-19 DIAGNOSIS — M54.16 LUMBAR RADICULOPATHY: ICD-10-CM

## 2024-06-19 DIAGNOSIS — M54.9 INTRACTABLE BACK PAIN: ICD-10-CM

## 2024-06-19 DIAGNOSIS — M51.26 LUMBAR DISC HERNIATION: Primary | ICD-10-CM

## 2024-06-19 DIAGNOSIS — Z79.899 MEDICATION MANAGEMENT: ICD-10-CM

## 2024-06-19 LAB — RH BLOOD TYPE: POSITIVE

## 2024-06-19 PROCEDURE — 0SB40ZZ EXCISION OF LUMBOSACRAL DISC, OPEN APPROACH: ICD-10-PCS | Performed by: STUDENT IN AN ORGANIZED HEALTH CARE EDUCATION/TRAINING PROGRAM

## 2024-06-19 PROCEDURE — 76000 FLUOROSCOPY <1 HR PHYS/QHP: CPT | Performed by: STUDENT IN AN ORGANIZED HEALTH CARE EDUCATION/TRAINING PROGRAM

## 2024-06-19 RX ORDER — NALOXONE HYDROCHLORIDE 0.4 MG/ML
0.08 INJECTION, SOLUTION INTRAMUSCULAR; INTRAVENOUS; SUBCUTANEOUS AS NEEDED
Status: DISCONTINUED | OUTPATIENT
Start: 2024-06-19 | End: 2024-06-19

## 2024-06-19 RX ORDER — HYDROCODONE BITARTRATE AND ACETAMINOPHEN 10; 325 MG/1; MG/1
1 TABLET ORAL ONCE AS NEEDED
Status: DISCONTINUED | OUTPATIENT
Start: 2024-06-19 | End: 2024-06-19

## 2024-06-19 RX ORDER — ONDANSETRON 2 MG/ML
4 INJECTION INTRAMUSCULAR; INTRAVENOUS EVERY 6 HOURS PRN
Status: DISCONTINUED | OUTPATIENT
Start: 2024-06-19 | End: 2024-06-19

## 2024-06-19 RX ORDER — DIAZEPAM 10 MG/1
10 TABLET ORAL ONCE
Status: COMPLETED | OUTPATIENT
Start: 2024-06-19 | End: 2024-06-19

## 2024-06-19 RX ORDER — DEXAMETHASONE SODIUM PHOSPHATE 4 MG/ML
VIAL (ML) INJECTION AS NEEDED
Status: DISCONTINUED | OUTPATIENT
Start: 2024-06-19 | End: 2024-06-19 | Stop reason: SURG

## 2024-06-19 RX ORDER — KETOROLAC TROMETHAMINE 30 MG/ML
30 INJECTION, SOLUTION INTRAMUSCULAR; INTRAVENOUS ONCE
Status: DISCONTINUED | OUTPATIENT
Start: 2024-06-19 | End: 2024-06-19

## 2024-06-19 RX ORDER — PROCHLORPERAZINE EDISYLATE 5 MG/ML
5 INJECTION INTRAMUSCULAR; INTRAVENOUS EVERY 8 HOURS PRN
Status: DISCONTINUED | OUTPATIENT
Start: 2024-06-19 | End: 2024-06-19

## 2024-06-19 RX ORDER — HYDROMORPHONE HYDROCHLORIDE 1 MG/ML
0.6 INJECTION, SOLUTION INTRAMUSCULAR; INTRAVENOUS; SUBCUTANEOUS EVERY 5 MIN PRN
Status: DISCONTINUED | OUTPATIENT
Start: 2024-06-19 | End: 2024-06-19

## 2024-06-19 RX ORDER — METHOCARBAMOL 500 MG/1
500 TABLET, FILM COATED ORAL EVERY 12 HOURS PRN
Qty: 30 TABLET | Refills: 0 | Status: SHIPPED | OUTPATIENT
Start: 2024-06-19

## 2024-06-19 RX ORDER — HYDROCODONE BITARTRATE AND ACETAMINOPHEN 10; 325 MG/1; MG/1
2 TABLET ORAL ONCE AS NEEDED
Status: DISCONTINUED | OUTPATIENT
Start: 2024-06-19 | End: 2024-06-19

## 2024-06-19 RX ORDER — ROCURONIUM BROMIDE 10 MG/ML
INJECTION, SOLUTION INTRAVENOUS AS NEEDED
Status: DISCONTINUED | OUTPATIENT
Start: 2024-06-19 | End: 2024-06-19 | Stop reason: SURG

## 2024-06-19 RX ORDER — SENNOSIDES 8.6 MG
17.2 TABLET ORAL NIGHTLY PRN
Qty: 30 TABLET | Refills: 0 | Status: SHIPPED | OUTPATIENT
Start: 2024-06-19

## 2024-06-19 RX ORDER — ONDANSETRON 4 MG/1
4 TABLET, FILM COATED ORAL EVERY 12 HOURS PRN
Qty: 10 TABLET | Refills: 0 | Status: SHIPPED | OUTPATIENT
Start: 2024-06-19

## 2024-06-19 RX ORDER — SODIUM CHLORIDE, SODIUM LACTATE, POTASSIUM CHLORIDE, CALCIUM CHLORIDE 600; 310; 30; 20 MG/100ML; MG/100ML; MG/100ML; MG/100ML
INJECTION, SOLUTION INTRAVENOUS CONTINUOUS
Status: DISCONTINUED | OUTPATIENT
Start: 2024-06-19 | End: 2024-06-19

## 2024-06-19 RX ORDER — MIDAZOLAM HYDROCHLORIDE 1 MG/ML
INJECTION INTRAMUSCULAR; INTRAVENOUS AS NEEDED
Status: DISCONTINUED | OUTPATIENT
Start: 2024-06-19 | End: 2024-06-19 | Stop reason: SURG

## 2024-06-19 RX ORDER — DIAZEPAM 5 MG/1
5 TABLET ORAL NIGHTLY PRN
Qty: 20 TABLET | Refills: 0 | Status: SHIPPED | OUTPATIENT
Start: 2024-06-19

## 2024-06-19 RX ORDER — ACETAMINOPHEN 10 MG/ML
INJECTION, SOLUTION INTRAVENOUS
Status: COMPLETED
Start: 2024-06-19 | End: 2024-06-19

## 2024-06-19 RX ORDER — DASATINIB 100 MG/1
1 TABLET ORAL DAILY
Status: SHIPPED | COMMUNITY
Start: 2024-06-20

## 2024-06-19 RX ORDER — HYDROMORPHONE HYDROCHLORIDE 1 MG/ML
0.4 INJECTION, SOLUTION INTRAMUSCULAR; INTRAVENOUS; SUBCUTANEOUS EVERY 5 MIN PRN
Status: DISCONTINUED | OUTPATIENT
Start: 2024-06-19 | End: 2024-06-19

## 2024-06-19 RX ORDER — ACETAMINOPHEN 500 MG
1000 TABLET ORAL ONCE
Status: DISCONTINUED | OUTPATIENT
Start: 2024-06-19 | End: 2024-06-19 | Stop reason: HOSPADM

## 2024-06-19 RX ORDER — ONDANSETRON 2 MG/ML
INJECTION INTRAMUSCULAR; INTRAVENOUS AS NEEDED
Status: DISCONTINUED | OUTPATIENT
Start: 2024-06-19 | End: 2024-06-19 | Stop reason: SURG

## 2024-06-19 RX ORDER — BUPIVACAINE HYDROCHLORIDE AND EPINEPHRINE 2.5; 5 MG/ML; UG/ML
INJECTION, SOLUTION EPIDURAL; INFILTRATION; INTRACAUDAL; PERINEURAL AS NEEDED
Status: DISCONTINUED | OUTPATIENT
Start: 2024-06-19 | End: 2024-06-19 | Stop reason: HOSPADM

## 2024-06-19 RX ORDER — LIDOCAINE HYDROCHLORIDE 10 MG/ML
INJECTION, SOLUTION EPIDURAL; INFILTRATION; INTRACAUDAL; PERINEURAL AS NEEDED
Status: DISCONTINUED | OUTPATIENT
Start: 2024-06-19 | End: 2024-06-19 | Stop reason: SURG

## 2024-06-19 RX ORDER — HYDROMORPHONE HYDROCHLORIDE 1 MG/ML
0.2 INJECTION, SOLUTION INTRAMUSCULAR; INTRAVENOUS; SUBCUTANEOUS EVERY 5 MIN PRN
Status: DISCONTINUED | OUTPATIENT
Start: 2024-06-19 | End: 2024-06-19

## 2024-06-19 RX ORDER — ACETAMINOPHEN 500 MG
1000 TABLET ORAL EVERY 8 HOURS PRN
Qty: 90 TABLET | Refills: 0 | Status: SHIPPED | OUTPATIENT
Start: 2024-06-19

## 2024-06-19 RX ORDER — LABETALOL HYDROCHLORIDE 5 MG/ML
5 INJECTION, SOLUTION INTRAVENOUS EVERY 5 MIN PRN
Status: DISCONTINUED | OUTPATIENT
Start: 2024-06-19 | End: 2024-06-19

## 2024-06-19 RX ORDER — NALOXONE HYDROCHLORIDE 4 MG/.1ML
4 SPRAY NASAL AS NEEDED
Qty: 1 KIT | Refills: 0 | Status: SHIPPED | OUTPATIENT
Start: 2024-06-19

## 2024-06-19 RX ORDER — ACETAMINOPHEN 10 MG/ML
1000 INJECTION, SOLUTION INTRAVENOUS ONCE
Status: COMPLETED | OUTPATIENT
Start: 2024-06-19 | End: 2024-06-19

## 2024-06-19 RX ORDER — METHOCARBAMOL 100 MG/ML
INJECTION, SOLUTION INTRAMUSCULAR; INTRAVENOUS
Status: COMPLETED
Start: 2024-06-19 | End: 2024-06-19

## 2024-06-19 RX ORDER — METHOCARBAMOL 100 MG/ML
1000 INJECTION, SOLUTION INTRAMUSCULAR; INTRAVENOUS ONCE
Status: COMPLETED | OUTPATIENT
Start: 2024-06-19 | End: 2024-06-19

## 2024-06-19 RX ORDER — OXYCODONE HYDROCHLORIDE 5 MG/1
5 TABLET ORAL
Qty: 20 TABLET | Refills: 0 | Status: SHIPPED | OUTPATIENT
Start: 2024-06-19 | End: 2024-06-21

## 2024-06-19 RX ORDER — ACETAMINOPHEN 500 MG
1000 TABLET ORAL ONCE AS NEEDED
Status: DISCONTINUED | OUTPATIENT
Start: 2024-06-19 | End: 2024-06-19

## 2024-06-19 RX ORDER — SCOLOPAMINE TRANSDERMAL SYSTEM 1 MG/1
1 PATCH, EXTENDED RELEASE TRANSDERMAL ONCE
Status: DISCONTINUED | OUTPATIENT
Start: 2024-06-19 | End: 2024-06-19 | Stop reason: HOSPADM

## 2024-06-19 RX ADMIN — ROCURONIUM BROMIDE 50 MG: 10 INJECTION, SOLUTION INTRAVENOUS at 14:46:00

## 2024-06-19 RX ADMIN — ROCURONIUM BROMIDE 10 MG: 10 INJECTION, SOLUTION INTRAVENOUS at 14:36:00

## 2024-06-19 RX ADMIN — MIDAZOLAM HYDROCHLORIDE 2 MG: 1 INJECTION INTRAMUSCULAR; INTRAVENOUS at 14:44:00

## 2024-06-19 RX ADMIN — ONDANSETRON 4 MG: 2 INJECTION INTRAMUSCULAR; INTRAVENOUS at 14:46:00

## 2024-06-19 RX ADMIN — SODIUM CHLORIDE, SODIUM LACTATE, POTASSIUM CHLORIDE, CALCIUM CHLORIDE: 600; 310; 30; 20 INJECTION, SOLUTION INTRAVENOUS at 16:56:00

## 2024-06-19 RX ADMIN — LIDOCAINE HYDROCHLORIDE 50 MG: 10 INJECTION, SOLUTION EPIDURAL; INFILTRATION; INTRACAUDAL; PERINEURAL at 14:36:00

## 2024-06-19 RX ADMIN — MIDAZOLAM HYDROCHLORIDE 2 MG: 1 INJECTION INTRAMUSCULAR; INTRAVENOUS at 14:25:00

## 2024-06-19 RX ADMIN — DEXAMETHASONE SODIUM PHOSPHATE 8 MG: 4 MG/ML VIAL (ML) INJECTION at 14:46:00

## 2024-06-19 NOTE — H&P
[facility] - ORTHOPEDICS  [address], [address], [place] [address]  [address] [place] [address]  [phone]     NEW PATIENT VISIT - HISTORY AND PHYSICAL EXAMINATION     Name: Valerio Felipe   MRN: [id number]  Date: 06/19/24       CC:     REFERRED BY: Ayse Luo DO    HPI:   Valerio Felipe is a very pleasant 53 year old male who presents today for evaluation of back and leg pain. The distribution of symptoms are: 10% backpain and 90% leg pain. The symptoms began 4 week(s) ago without any significant injury. Since the onset, the symptoms have become slowly worse over time. Patient feels pain is aggravated by bending, sitting. The patient reports  numbness and  weakness.  The symptom characteristics are as follows: Patient is a 53-year-old male presenting with low back pain radiating down right lower extremity with worsening symptoms over the past 4 weeks.  Patient has been to the emergency department and seen primary care physician.  Patient has been prescribed various medications including oral steroids, anti-inflammatory, Norco.  Patient cannot perform activities of daily living due to severe diffuse symptoms.     Patient was admitted to the hospital for pain control and had an GINA. None of those provided adequate pain relief. Patient cannot sit and cannot perform ADLs.        PMH:   Past Medical History:    Back problem    Essential hypertension    High blood pressure    High cholesterol    Leukemia (HCC)    Leukemia (HCC)    Personal history of antineoplastic chemotherapy    oral chemo ([facility])    Smoking       PAST SURGICAL HX:  Past Surgical History:   Procedure Laterality Date    Colonoscopy  07/2016    diverticulosis, polyps (adenoma/hp). repeat 5 years    Colonoscopy N/A 05/17/2021    Procedure: COLONOSCOPY;  Surgeon: Fortino Maier MD;  Location:  ENDOSCOPY    Colonoscopy,biopsy N/A 07/11/2016    Procedure: COLONOSCOPY, POSSIBLE BIOPSY, POSSIBLE  POLYPECTOMY 46811;  Surgeon: Fortino Maier MD;  Location: [facility]    Colonoscopy,remv lesn,snare N/A 2016    Procedure: COLONOSCOPY, POSSIBLE BIOPSY, POSSIBLE POLYPECTOMY 04249;  Surgeon: Fortino Maier MD;  Location: [facility]    Create eardrum opening,gen anesth  10/07/2021    Knee arthroscopy      Lap gastric bypass/hernando-en-y      Other surgical history      Rotator Cuff Repair left side    Other surgical history Right     meniscus       FAMILY HX:  Family History   Problem Relation Age of Onset    Other (COPD [Other]) Father     Heart Attack Father     Stroke Father     Breast Cancer Mother     Diabetes Mother        ALLERGIES:  Patient has no known allergies.    MEDICATIONS:   No current outpatient medications on file.       ROS: A comprehensive 14 point review of systems was performed and was negative aside from the aforementioned per history of present illness.    SOCIAL HX:  Social History     Tobacco Use    Smoking status: Former     Current packs/day: 0.00     Types: Cigarettes     Quit date: 8/10/2018     Years since quittin.8    Smokeless tobacco: Never   Substance Use Topics    Alcohol use: Yes     Comment: once per month         PE:   Vitals:    24 0925 24 1148   BP:  (!) 122/97   BP Location:  Right arm   Pulse:  80   Resp:  16   Temp:  98.7 °F (37.1 °C)   TempSrc:  Oral   SpO2:  99%   Weight: 260 lb (117.9 kg) 273 lb (123.8 kg)   Height: 5' 7\" (1.702 m) 5' 7\" (1.702 m)     Estimated body mass index is 42.76 kg/m² as calculated from the following:    Height as of this encounter: 5' 7\" (1.702 m).    Weight as of this encounter: 273 lb (123.8 kg).    Physical Exam  Constitutional:       Appearance: Normal appearance.   HENT:      Head: Normocephalic and atraumatic.   Eyes:      Extraocular Movements: Extraocular movements intact.   Cardiovascular:      Pulses: Normal pulses. Skin warm and well perfused.  Pulmonary:      Effort: Pulmonary effort is normal.  No respiratory distress.   Skin:     General: Skin is warm.   Psychiatric:         Mood and Affect: Mood normal.     Spine Exam:    Normal gait without difficulty  Able to heel, toe, tandem gait without difficulty  Level shoulders and hips in even stance    Restricted L-spine ROM    No tenderness to palpation of L-spine    Straight leg raise test: positive    Sustained clonus: negative    LE Strength: 5/5 IP QUAD TA EHL GSC  LE Sensation: normal in L2-S1 distribution  LE reflexes: normal    Radiographic Examination/Diagnostics:  MRI personally viewed, independently interpreted and radiology report was reviewed.    MRI shows right paracentral disc herniation at L5-S1    IMPRESSION: Valerio Felipe is a 53 year old male with L5-S1 disc herniation and lumbar radiculopathy refractory to medical treatment and is unable to perform ADLs.    PLAN:     We reviewed the patients history, symptoms, exam findings, and imaging today.  We had a detailed discussion outlining the etiology, anatomy, pathophysiology, and natural history of lumbar disc herniation. The typical management of this condition may include lifestyle modification, NSAIDs, physical therapy, oral steroids, epidural injections, neuromodulatory medications, and sometimes pain medications.  Based on our discussion today we would like to have the patient initiate our recommendations for continued conservative therapy in the treatment of their condition noted in the assessment section.       The patient has participated in extensive conservative management and has failed gain any significant improvement in his symptoms over the last 4 weeks.  The symptoms have failed to respond to conservative measures for greater than 4 weeks now, to include: prescription strength analgesics and active documented physical therapy or therapeutic exercises including stretching and strengthening.  The patient does not have any cognitive or behavioral issues requiring further evaluation  or management.      In addition to the above, he has significant functional impairment and is unable to perform activities of daily living.      We discussed the risks, benefits, and alternatives of treatment.  The patient fully understands the nature of his medical condition and fully understands the nature of the proposed surgical treatment.  I have fully explained all possible alternative treatments or procedures and the patient understands the significant risks involved in the proposed treatment, as well as the risks involved and benefits of all alternative treatments and procedures. The patient wishes to proceed with surgery.    Plan: L5-S1 microdiscectomy       Mark Rebolledo MD  Orthopedic Spine Surgeon  [facility]   [address] [place] [address].[facility]  [email or web address]  t: [phone]   f: [phone]        This note was dictated using Dragon software.  While it was briefly proofread prior to completion, some grammatical, spelling, and word choice errors due to dictation may still occur.

## 2024-06-19 NOTE — INTERVAL H&P NOTE
Pre-op Diagnosis: Lumbar disc herniation [M51.26]    The above referenced H&P was reviewed by Mark Rebolledo MD on 6/19/2024, the patient was examined and no significant changes have occurred in the patient's condition since the H&P was performed.  I discussed with the patient and/or legal representative the potential benefits, risks and side effects of this procedure; the likelihood of the patient achieving goals; and potential problems that might occur during recuperation.  I discussed reasonable alternatives to the procedure, including risks, benefits and side effects related to the alternatives and risks related to not receiving this procedure.  We will proceed with procedure as planned.

## 2024-06-19 NOTE — ANESTHESIA POSTPROCEDURE EVALUATION
[facility]    Valerio Felipe Patient Status:  Hospital Outpatient Surgery   Age/Gender 53 year old male MRN [id number]   Location [facility] POST ANESTHESIA CARE UNIT Attending Mark Rebolledo MD   Hosp Day # 0 PCP Ayse Luo DO       Anesthesia Post-op Note    Right Lumbar 5-Sacrum 1 microdiscectomy    Procedure Summary       Date: 06/19/24 Room / Location:  MAIN OR 15 / [facility] MAIN OR    Anesthesia Start: 1425 Anesthesia Stop: 1656    Procedure: Right Lumbar 5-Sacrum 1 microdiscectomy (Right: Spine Lumbar) Diagnosis:       Lumbar disc herniation      (Lumbar disc herniation [M51.26])    Surgeons: Mark Rebolledo MD Anesthesiologist: Virgilio Ceballos MD    Anesthesia Type: general ASA Status: 3            Anesthesia Type: general    Vitals Value Taken Time   /79 06/19/24 1652   Temp 98 06/19/24 1656   Pulse 89 06/19/24 1655   Resp 24 06/19/24 1655   SpO2 98 % 06/19/24 1656   Vitals shown include unfiled device data.    Patient Location: PACU    Anesthesia Type: general    Airway Patency: patent    Postop Pain Control: adequate    Mental Status: preanesthetic baseline    Nausea/Vomiting: none    Cardiopulmonary/Hydration status: stable euvolemic    Complications: no apparent anesthesia related complications    Postop vital signs: stable    Dental Exam: Unchanged from Preop    Patient to be discharged from PACU when criteria met.

## 2024-06-19 NOTE — CM/SW NOTE
06/19/24 1500   CM/SW Referral Data   Referral Source Social Work (self-referral)   Reason for Referral Discharge planning   Informant EMR;Clinical Staff Member   Discharge Needs   Anticipated D/C needs Home health care       Chart reviewed for discharge planning.  Anticipate plan for patient to discharge home today from \[facility]\"".  Pt is a 52 y/o man having ALFONZO with Dr Rebolledo today.  Pt with pre-operative plan for HHC with [facility] ([facility] at [place].  Referral sent to  provider via [facility] to confirm acceptance.  Response received that pt has been accepted.  Await post op therapy recommendations for further DC planning.  / to remain available for support and/or discharge planning.     Mimi Kuhn, [facility]  Discharge Planner  [phone]

## 2024-06-19 NOTE — ANESTHESIA PREPROCEDURE EVALUATION
PRE-OP EVALUATION    Patient Name: Valerio Felipe    Admit Diagnosis: Lumbar disc herniation [M51.26]    Pre-op Diagnosis: Lumbar disc herniation [M51.26]    Right Lumbar 5-Sacrum 1 microdiscectomy    Anesthesia Procedure: Right Lumbar 5-Sacrum 1 microdiscectomy (Right: Spine Lumbar)    Surgeons and Role:     * Mark Rebolledo MD - Primary    Pre-op vitals reviewed.  Temp: 98.7 °F (37.1 °C)  Pulse: 80  Resp: 16  BP: 122/97  SpO2: 99 %  Body mass index is 42.76 kg/m².    Current medications reviewed.  Hospital Medications:  • [Transfer Hold] acetaminophen (Tylenol Extra Strength) tab 1,000 mg  1,000 mg Oral Once   • [Transfer Hold] scopolamine (Transderm-Scop) 1 MG/3DAYS patch 1 patch  1 patch Transdermal Once   • lactated ringers infusion   Intravenous Continuous   • ceFAZolin (Ancef) 2g in 10mL IV syringe premix  2 g Intravenous Once   • ceFAZolin (Ancef) 2 g/10mL IV syringe premix           Outpatient Medications:     Medications Prior to Admission   Medication Sig Dispense Refill Last Dose   • amphetamine-dextroamphetamine (ADDERALL) 20 MG Oral Tab Take 1 tablet (20 mg total) by mouth 2 (two) times daily. 60 tablet 0 Past Month   • methocarbamol 500 MG Oral Tab Take 1 tablet (500 mg total) by mouth every 12 (twelve) hours. 30 tablet 0 6/18/2024 at 2100   • methylPREDNISolone 4 MG Oral Tablet Therapy Pack Take as directed on dose pack instructions- start at DAY TWO 21 tablet 0 Past Month   • HYDROcodone-acetaminophen (NORCO)  MG Oral Tab Take 1 tablet by mouth every 6 (six) hours as needed for Pain. 40 tablet 0 6/18/2024 at 2100   • losartan 100 MG Oral Tab Take 1 tablet (100 mg total) by mouth daily. 90 tablet 0 6/18/2024 at 0700   • spironolactone 50 MG Oral Tab Take 1 tablet (50 mg total) by mouth daily. 90 tablet 0 6/18/2024 at 0700   • atorvastatin 10 MG Oral Tab Take 1 tablet (10 mg total) by mouth daily. 90 tablet 0 6/18/2024 at 0800   • Dasatinib (SPRYCEL) 100 MG Oral Tab Take 1 tablet by mouth daily.   0 6/18/2024 at 0700   • Labetalol HCl 200 MG Oral Tab Take 1 tablet (200 mg total) by mouth 2 (two) times daily. 60 tablet 3 6/19/2024 at 0800   • Vitamin B-12 1000 MCG Oral Tab Take 1 tablet (1,000 mcg total) by mouth daily.   6/5/2024   • Cholecalciferol (VITAMIN D3) 1.25 MG (62704 UT) Oral Cap Take 1 tablet by mouth once a week.   6/12/2024   • Ascorbic Acid (VITAMIN C) 1000 MG Oral Tab Take 1 tablet (1,000 mg total) by mouth daily.   6/12/2024   • [START ON 7/13/2024] amphetamine-dextroamphetamine (ADDERALL) 20 MG Oral Tab Take 1 tablet (20 mg total) by mouth 2 (two) times daily. 60 tablet 0    • [START ON 8/11/2024] amphetamine-dextroamphetamine (ADDERALL) 20 MG Oral Tab Take 1 tablet (20 mg total) by mouth 2 (two) times daily. 60 tablet 0    • HYDROcodone-acetaminophen  MG Oral Tab Take 2 tablets by mouth every 6 (six) hours as needed. 80 tablet 0        Allergies: Patient has no known allergies.      Anesthesia Evaluation        Anesthetic Complications  (-) history of anesthetic complications         GI/Hepatic/Renal                                 Cardiovascular            MET: >4    (+) obesity  (+) hypertension and well controlled                                    Endo/Other    Negative endo/other ROS.                              Pulmonary                    (+) sleep apnea and CPAP      Neuro/Psych                 (+) neuromuscular disease  (+) headaches                 Past Surgical History:   Procedure Laterality Date   • Colonoscopy  07/2016    diverticulosis, polyps (adenoma/hp). repeat 5 years   • Colonoscopy N/A 05/17/2021    Procedure: COLONOSCOPY;  Surgeon: Fortino Maier MD;  Location:  ENDOSCOPY   • Colonoscopy,biopsy N/A 07/11/2016    Procedure: COLONOSCOPY, POSSIBLE BIOPSY, POSSIBLE POLYPECTOMY 46085;  Surgeon: Fortino Maier MD;  Location: [facility]   • Colonoscopy,remv lesn,snare N/A 07/11/2016    Procedure: COLONOSCOPY, POSSIBLE BIOPSY, POSSIBLE POLYPECTOMY 94512;   Surgeon: Fortino Maier MD;  Location: [facility]   • Create eardrum opening,gen anesth  10/07/2021   • Knee arthroscopy     • Lap gastric bypass/hernando-en-y     • Other surgical history  2005    Rotator Cuff Repair left side   • Other surgical history Right     meniscus     Social History     Socioeconomic History   • Marital status:    Tobacco Use   • Smoking status: Former     Current packs/day: 0.00     Types: Cigarettes     Quit date: 8/10/2018     Years since quittin.8   • Smokeless tobacco: Never   Vaping Use   • Vaping status: Never Used   Substance and Sexual Activity   • Alcohol use: Yes     Comment: once per month   • Drug use: Yes     Types: Cannabis     Comment: medical marijuana card   Other Topics Concern   • Caffeine Concern Yes     Comment: 1 cup a day    • Exercise Yes     Comment: 7 x week      History   Drug Use   • Types: Cannabis     Comment: medical marijuana card     Available pre-op labs reviewed.  Lab Results   Component Value Date    WBC 11.2 (H) 2024    RBC 3.28 (L) 2024    HGB 10.1 (L) 2024    HCT 31.7 (L) 2024    MCV 96.6 2024    MCH 30.8 2024    MCHC 31.9 2024    RDW 13.3 2024    .0 2024     Lab Results   Component Value Date     2024    K 4.5 2024     2024    CO2 25.0 2024    BUN 17 2024    CREATSERUM 0.94 2024     (H) 2024    CA 8.9 2024     Lab Results   Component Value Date    INR 0.99 2024         Airway      Mallampati: III  Mouth opening: >3 FB  TM distance: 4 - 6 cm  Neck ROM: full Cardiovascular    Cardiovascular exam normal.         Dental    Dentition appears grossly intact         Pulmonary    Pulmonary exam normal.                 Other findings          ASA: 3   Plan: general  NPO status verified and patient meets guidelines.          Plan/risks discussed with: patient and spouse              Present on  Admission:  **None**

## 2024-06-19 NOTE — BRIEF OP NOTE
Pre-Operative Diagnosis: Lumbar disc herniation [M51.26]     Post-Operative Diagnosis: Lumbar disc herniation [M51.26]      Procedure Performed:   Right Lumbar 5-Sacrum 1 microdiscectomy    Surgeons and Role:     * Mark Rebolledo MD - Primary    Assistant(s):  DOMINGAN: Clarissa Brennan APRN     Surgical Findings: lumbar disc herniation      Specimen: none     Estimated Blood Loss: Blood Output: 20 mL (6/19/2024  4:28 PM)      Plan    Antibiotics:  complete  Anticoagulation: SCDs and early ambulation  Drain: None  Activity: Out of bed with assistance  Brace: None  Medical history, allergies, and medications:In EPIC  Pain control: Routine postop  Anticipated discharge: When pain control and activity tolerance allow. Should be  today  Consults:   none  Other information: None      LUZ MARINA San  6/19/2024  4:42 PM

## 2024-06-19 NOTE — ANESTHESIA PROCEDURE NOTES
Airway  Date/Time: 6/19/2024 2:36 PM  Urgency: elective      General Information and Staff    Patient location during procedure: OR  Anesthesiologist: Virgilio Ceballos MD  Performed: anesthesiologist   Performed by: Virgilio Ceballos MD  Authorized by: Virgilio Ceballos MD      Indications and Patient Condition  Indications for airway management: anesthesia  Sedation level: deep  Preoxygenated: yes  Patient position: sniffing  Mask difficulty assessment: 1 - vent by mask    Final Airway Details  Final airway type: endotracheal airway      Successful airway: ETT  Cuffed: yes   Successful intubation technique: Video laryngoscopy  Facilitating devices/methods: anterior pressure/BURP  Endotracheal tube insertion site: oral  Blade: GlideScope  Blade size: #4  ETT size (mm): 7.0    Cormack-Lehane Classification: grade IIA - partial view of glottis  Placement verified by: capnometry   Cuff volume (mL): 8  Measured from: lips  Number of attempts at approach: 1

## 2024-06-20 ENCOUNTER — TELEPHONE (OUTPATIENT)
Dept: ORTHOPEDICS CLINIC | Facility: CLINIC | Age: 54
End: 2024-06-20

## 2024-06-20 DIAGNOSIS — M54.9 INTRACTABLE BACK PAIN: ICD-10-CM

## 2024-06-20 DIAGNOSIS — M54.16 LUMBAR RADICULOPATHY: ICD-10-CM

## 2024-06-20 RX ORDER — METHOCARBAMOL 500 MG/1
500 TABLET, FILM COATED ORAL EVERY 12 HOURS PRN
Qty: 30 TABLET | Refills: 0 | OUTPATIENT
Start: 2024-06-20

## 2024-06-20 NOTE — TELEPHONE ENCOUNTER
06.19.24: RT L5-S1 MICRODISCECTOMY    S/w pt who stated his pain is 8/10 and he's unable to lay down or sleep as the Oxy is not helping.    Plan with [facility] RN is to give it another 24hrs and if still having pain call our office to possibly up Oxy from [facility].    Denies fevers, chills, nausea.    Not too much of an appetite but is eating and getting his steps in.    Pain is tolerable per pt.    Will be reaching out to PSR to have post op appt r/s with LUZ MARINA Zuniga as she see's pt's for 1st post op appts - confirmed with MD

## 2024-06-20 NOTE — TELEPHONE ENCOUNTER
Patient called back    Future Appointments   Date Time Provider Department Center   7/9/2024  9:30 AM Clarissa Brennan APRN [facility]   7/9/2024  2:15 PM Ayse Luo DO [facility] [address]   7/23/2024 10:45 AM Atnoine Rutledge,  [id number]

## 2024-06-20 NOTE — TELEPHONE ENCOUNTER
LMOM asking patient to contact our office to reschedule post op appointment with Clarissa Brennan on 7/8/24 or that week of.

## 2024-06-20 NOTE — TELEPHONE ENCOUNTER
Called the patient and spoke with him about requesting a refill.  He should have medication still left.  He states he takes it 3 times a day after discussing it with the provider.  He did not get the refill from the pharmacy because it was not covered because refill was to soon.      Pharmacy called and medication transferred to the [facility] on [address] for refill tomorrow.

## 2024-06-20 NOTE — OPERATIVE REPORT
SURGEON: Mark Rebolledo MD    DATE OF SURGERY: 6/19/2024    PREOPERATIVE DIAGNOSIS:   Lumbar disc disease with radiculopathy    POSTOPERATIVE DIAGNOSIS:   Lumbar disc disease with radiculopathy    NAME OF OPERATION:  1. Right L5-S1 laminotomy and microdiscectomy (76484)    ANESTHESIA: General endotracheal.     ESTIMATED BLOOD LOSS: 20 mL.     DISPOSITION: Stable, extubated to PACU.     INDICATIONS: This patient is a 53 year old-year-old male with lumbar radiculopathy and has failed non operative management. Imaging demonstrated right paracentral disc herniation at L5-S1.  After discussing the risks and benefits of surgery and of non operative treatment, the patient elected for the above procedure.  Please see clinic notes for details. Prior to surgery I explained in detail the risks of surgery including: risk of nerve injury, persistent and or worsening pain, spinal fluid leak, infection or meningitis, excessive bleeding, paralysis, death, blindness, sexual dysfunction, blood vessel injury, injury to neighboring organs, need for further surgery, bowel and bladder dysfunction, spinal instability, and autonomic nervous system dysfunction as well as unforeseen medical and surgical complications. An understanding that in general spinal surgery is more predictive in improving extremity discomfort than axial spine pain was stressed.          DESCRIPTION OF PROCEDURE: The patient was identified in the preoperative holding area. The site of surgery and consent verified with the patient. Patient was then brought to the Operating Room. General anesthesia was administered. The patient was intubated without difficulty. HECTOR and SCD stockings were placed for mechanical DVT prophylaxis. He was then positioned prone onto a Rasheed table with bony prominences well padded.  Abdomen was hanging free. His back was prepped and draped in the usual sterile standard fashion. A hard stop surgical timeout performed with all members of the  Operating Room staff, and IV Ancef given prior to surgical start.         The incision was localized with a lateral fluoroscopic image.  The lumbar spine was then prepped and draped in the standard sterile fashion.  An incision was made in the skin over the intended surgical levels and dissection was carried down through the subcutaneous tissue down to the level of the deep fascia. The deep fascia was elevated off the posterior elements in a subperiosteal manner.  An intra-operative fluoroscopic image was taken to confirm the appropriate spinal level.  At this time a Ga retractor was placed and the operating microscope was draped and brought into the field.      With a high speed beverly, a laminotomy was performed at right lamina. A curette was used to remove, in a subperiosteal manner, the attachment of the ligamentum flavum from the undersurface of the superior lamina. The ligamentum flavum was then dissected free of its attachment to the pars interarticularis, the medial articular facet and the inferior lamina.  The lateral dura and the traversing nerve root were exposed, and this also allowed for palpation of the pedicle and neural foramen.  After obtaining meticulous hemostasis using a bipolar cautery, the shoulder of the traversing nerve root was mobilized and the disk space was exposed.     A nerve root retractor was placed lateral to the traversing nerve root to protect the neural structures. A bipolar was used for hemostasis and was used on top of the disc space as further confirmation there were no neural structures over the disc space. A 15-blade on a long handle was used to make an annulotomy in line with the nerve root. Palpation around the annulotomy with a down-going curette led to extrusion of the disc material. A micropituitary was used to grab and remove herniated fragment in one large piece and other smaller extruded fragments. A nerve hook was used in the disc space to free up any unstable  disc material. A micropituitary was used to remove small fragments of disc.     A Penfield 4 and Lobo was then used to palpate ventral to the dura medially, distally, proximally and laterally and all loose disk fragments were removed.  The disk space and floor of the canal were flushed with saline to free up loose fragments.  At this time a foraminotomy was performed at the S1 foramen to ensure complete decompression of the nerve root.  The nerve root and canal were then checked again with a Garza probe.  Being convinced that the nerve root was completely free and mobile, the wound was copiously irrigated and meticulous hemostasis was obtained. The retractors were removed and the side walls were again inspected to ensure no bleeding vessels remained.     The fascial layer was closed with 0 vicryl sutures in interrupted, figure-of-8 fashion. The subdermal layer was closed with 2-0 vicryl suture and the skin was closed with a buried subcuticular stitch. Dermabond was placed over the incision. The patient was then carefully placed in the supine position and extubated.           I attest I was present for and performed all key and critical aspects of the procedure.    The aid of assistant Clarissa Brennan was needed for patient positioning, preparation, drape, retraction,  wound closure, dressing application and critical portions of the procedure.

## 2024-06-21 DIAGNOSIS — M54.16 LUMBAR RADICULOPATHY: ICD-10-CM

## 2024-06-21 RX ORDER — OXYCODONE HYDROCHLORIDE 5 MG/1
5 TABLET ORAL
Qty: 20 TABLET | Refills: 0 | Status: SHIPPED | OUTPATIENT
Start: 2024-06-21

## 2024-06-21 NOTE — TELEPHONE ENCOUNTER
Patient is calling wanting to now if Dr. Rebolledo can refill oxyCODONE 5 MG Oral Tab to his [facility] pharmacy. Patient stated he spoke to Dr. Rebolledo last night and was given the ok to double up medication due to pain. Patient is wanting to know if 10mg can be prescribed versus 5mg.

## 2024-06-25 ENCOUNTER — TELEPHONE (OUTPATIENT)
Dept: ORTHOPEDICS CLINIC | Facility: CLINIC | Age: 54
End: 2024-06-25

## 2024-06-25 DIAGNOSIS — Z98.890 S/P LUMBAR MICRODISCECTOMY: Primary | ICD-10-CM

## 2024-06-25 NOTE — TELEPHONE ENCOUNTER
BRIAN  Home health RN called because patient self adjusted his pain medication/dosing     DOS: 06/19/24  Right Lumbar 5-Sacrum 1 microdiscectomy     Currently taking (will run out in about 2 days)  Norco  mg - 2 tabs q5-6 hours (prescribed by PCP prior to surgery)  Methocarbamol 500 mg - tid    - feels pain is managed with this dosing and would like to start decreasing dose    Not taking  Oxycodone 5 mg - not helping - not taking  Acetaminophen - not taking  Diazepam - not taking    Called Cindy JONES from [facility] -   Called the patient for a full update of what medication he is taking.  Education given on the importance of talking with your MD prior to changing medication and dosing.  Risk of overdose discussed and importance of using medication as prescribed. He verbalized understanding.

## 2024-06-25 NOTE — TELEPHONE ENCOUNTER
Home health RN Cindy called with post-op update for Dr. Rebolledo -     She states Mr. Felipe is in a considerable amount of pain despite alternating the Norco and Robaxin as directed.    He told her if he runs out of pain meds, he will have 10/10 pain.    Cindy would like a call back with Dr. Rebolledo's recommendation for patient.    [phone]

## 2024-06-26 RX ORDER — HYDROCODONE BITARTRATE AND ACETAMINOPHEN 10; 325 MG/1; MG/1
TABLET ORAL EVERY 8 HOURS PRN
Qty: 20 TABLET | Refills: 0 | Status: SHIPPED | OUTPATIENT
Start: 2024-06-26 | End: 2024-07-08 | Stop reason: ALTCHOICE

## 2024-06-26 NOTE — TELEPHONE ENCOUNTER
Future Appointments   Date Time Provider Department Center   7/9/2024  9:30 AM Clarissa Brennan APRN [facility]   7/9/2024  2:15 PM Ayse Luo,  [facility] [address]   7/23/2024 10:45 AM Antoine Rutledge, DO [id number]     Was taking oxy 5mg 2 tabs per dose, requesting norco .     Please advise if okay for change.

## 2024-06-26 NOTE — TELEPHONE ENCOUNTER
Patient can come for 2 week recheck early on 7/1. Please review early follow up due to pain.     Requested Prescriptions     Signed Prescriptions Disp Refills    HYDROcodone-acetaminophen (NORCO)  MG Oral Tab 20 tablet 0     Sig: Take 0.5-1 tablets by mouth every 8 (eight) hours as needed for Pain.     Authorizing Provider: MAYRA TORRES

## 2024-06-27 ENCOUNTER — TELEPHONE (OUTPATIENT)
Facility: CLINIC | Age: 54
End: 2024-06-27

## 2024-06-27 NOTE — TELEPHONE ENCOUNTER
Jack is calling from [facility] pharmacy regarding HYDROcodone-acetaminophen (NORCO)  MG Oral Tab medication. Pharmacists wanted to let Clarissa Brennan know that she is the 4th provider this month to give patient NORCO, he is receiving medication from his other providers as well and wanted to make Lakesha aware and see if she will still like to proceed with issuing medication.

## 2024-06-27 NOTE — TELEPHONE ENCOUNTER
Patient called back. He states he is doing better and is weaning himself off the Norco, but would like it filled one last time to get by until his 7/9 appointment with Clarissa.

## 2024-06-27 NOTE — TELEPHONE ENCOUNTER
Called patient back and let him know prescription was sent to the pharmacy.  The new prescription with decreased dose was discussed and he verbalized understanding. Patient states he will be tapering use down and will see the provider as previously scheduled so he can reduce dose by next office visit.     Reviewed with patient:   You may develop tolerance to the medicine. Tolerance means that you will need a higher dose of the medicine for pain relief. Tolerance is normal and is expected if you take the medication for a long time.  Do not suddenly stop taking your medicine because you may develop a severe reaction. Your body becomes used to the medicine over time, and you should discontinue the medication slowly. If you received instructions  from your physician about tapering/weaning the opiate you are taking, follow those instructions.  You should wean off the opiate and eventually not need to take the opiate for pain.  Use of opiates carries the risk of developing a substance use disorder (addiction), and potentially overdose with high doses and prolonged use. It is important to talk to your doctor right away if you feel your use of your medication is becoming excessive or problematic.

## 2024-06-27 NOTE — TELEPHONE ENCOUNTER
FYI -     Patient got [place] from 4 providers in the last month.   Called the pharmacy and clarified we will prescribing for now and given the go ahead to fill the script.  We are aware of the providers and situation.     Dr. Ayse Luo  - PCP  Tiffani Lynn - Hospitalist   Dr. Shayla Soto - Emergency Room MD      ER visit on 5/26/24  Hospitalized 6/4/24 - 6/10/24  DOS: 6/19/24

## 2024-06-28 RX ORDER — ATORVASTATIN CALCIUM 10 MG/1
10 TABLET, FILM COATED ORAL DAILY
Qty: 90 TABLET | Refills: 0 | Status: SHIPPED | OUTPATIENT
Start: 2024-06-28

## 2024-06-28 RX ORDER — LOSARTAN POTASSIUM 100 MG/1
100 TABLET ORAL DAILY
Qty: 90 TABLET | Refills: 0 | Status: SHIPPED | OUTPATIENT
Start: 2024-06-28

## 2024-06-28 RX ORDER — SPIRONOLACTONE 50 MG/1
50 TABLET, FILM COATED ORAL DAILY
Qty: 90 TABLET | Refills: 0 | Status: SHIPPED | OUTPATIENT
Start: 2024-06-28

## 2024-07-05 NOTE — PROGRESS NOTES
Post op follow up    CC:   Chief Complaint   Patient presents with    Back Pain     Post op  Back has a little pain but better , right butt check after sitting for a while the pain is a 5 and I have to get up - has questions if therapy would help this. Off pain meds        HPI:   Valerio Felipe is a 53 year old male with chief complaint of   Chief Complaint   Patient presents with    Back Pain     Post op  Back has a little pain but better , right butt check after sitting for a while the pain is a 5 and I have to get up - has questions if therapy would help this. Off pain meds      Post op: 6/19/2024 Right L5-S1 microdicectomy     Location is Right radicular pain is near 100% improved. Reports some LBP mostly in the right gluteal area. Had bruising over that area, that is now resolved.  Severity is 5/10. Frequency of symptoms is intermittent daily.  Duration is post operative.  Quality ache.  Improved by time and worsened by sitting long.    Incision: denies concerns  Taking: OTC medications. Stopped opiates 5-6 days ago    Denies CHEST PAIN/SOB/left calf pain. Denies fever/chills/nausea/vomiting.   + right calf pain and right greater than left LE swelling.       Current Medications:  Current Outpatient Medications   Medication Sig Dispense Refill    ATORVASTATIN 10 MG Oral Tab TAKE 1 TABLET(10 MG) BY MOUTH DAILY 90 tablet 0    SPIRONOLACTONE 50 MG Oral Tab TAKE 1 TABLET(50 MG) BY MOUTH DAILY 90 tablet 0    LOSARTAN 100 MG Oral Tab TAKE 1 TABLET(100 MG) BY MOUTH DAILY 90 tablet 0    Dasatinib (SPRYCEL) 100 MG Oral Tab Take 1 tablet by mouth daily.      amphetamine-dextroamphetamine (ADDERALL) 20 MG Oral Tab Take 1 tablet (20 mg total) by mouth 2 (two) times daily. 60 tablet 0    [START ON 7/13/2024] amphetamine-dextroamphetamine (ADDERALL) 20 MG Oral Tab Take 1 tablet (20 mg total) by mouth 2 (two) times daily. 60 tablet 0    [START ON 8/11/2024] amphetamine-dextroamphetamine (ADDERALL) 20 MG Oral Tab Take 1 tablet  (20 mg total) by mouth 2 (two) times daily. 60 tablet 0    Labetalol HCl 200 MG Oral Tab Take 1 tablet (200 mg total) by mouth 2 (two) times daily. 60 tablet 3    Vitamin B-12 1000 MCG Oral Tab Take 1 tablet (1,000 mcg total) by mouth daily.      Cholecalciferol (VITAMIN D3) 1.25 MG (66227 UT) Oral Cap Take 1 tablet by mouth once a week.      Ascorbic Acid (VITAMIN C) 1000 MG Oral Tab Take 1 tablet (1,000 mg total) by mouth daily.        HISTORY:  Past Medical History:    Back problem    Essential hypertension    High blood pressure    High cholesterol    Leukemia (HCC)    Leukemia (HCC)    Personal history of antineoplastic chemotherapy    oral chemo (Sprycel)    Smoking      Past Surgical History:   Procedure Laterality Date    Colonoscopy  2016    diverticulosis, polyps (adenoma/hp). repeat 5 years    Colonoscopy N/A 2021    Procedure: COLONOSCOPY;  Surgeon: Fortino Maier MD;  Location:  ENDOSCOPY    Colonoscopy,biopsy N/A 2016    Procedure: COLONOSCOPY, POSSIBLE BIOPSY, POSSIBLE POLYPECTOMY 92461;  Surgeon: Fortino Maier MD;  Location: [facility]    Colonoscopy,remv lesn,snare N/A 2016    Procedure: COLONOSCOPY, POSSIBLE BIOPSY, POSSIBLE POLYPECTOMY 29774;  Surgeon: Fortino Maier MD;  Location: [facility]    Create eardrum opening,gen anesth  10/07/2021    Knee arthroscopy      Lap gastric bypass/hernando-en-y      Other surgical history      Rotator Cuff Repair left side    Other surgical history Right     meniscus      Family History   Problem Relation Age of Onset    Other (COPD [Other]) Father     Heart Attack Father     Stroke Father     Breast Cancer Mother     Diabetes Mother       Social History     Socioeconomic History    Marital status:    Tobacco Use    Smoking status: Former     Current packs/day: 0.00     Types: Cigarettes     Quit date: 8/10/2018     Years since quittin.9    Smokeless tobacco: Never   Vaping Use    Vaping status: Never Used    Substance and Sexual Activity    Alcohol use: Yes     Comment: once per month    Drug use: Yes     Types: Cannabis     Comment: medical marijuana card   Other Topics Concern    Caffeine Concern Yes     Comment: 1 cup a day     Exercise Yes     Comment: 7 x week      Social Determinants of Health     Financial Resource Strain: Low Risk  (6/11/2024)    Financial Resource Strain     Difficulty of Paying Living Expenses: Not hard at all     Med Affordability: No   Food Insecurity: No Food Insecurity (6/5/2024)    Food Insecurity     Food Insecurity: Never true   Transportation Needs: No Transportation Needs (6/5/2024)    Transportation Needs     Lack of Transportation: No   Physical Activity: Inactive (2/18/2019)    Received from [facility], [facility]    Exercise Vital Sign     Days of Exercise per Week: 0 days     Minutes of Exercise per Session: 0 min    Received from [facility], [facility]    Social Connections   Housing Stability: Low Risk  (6/5/2024)    Housing Stability     Housing Instability: No        Exam     A&Ox4 in no acute distress. Seated on exam chair    non Antalgic gait, able to normal heel and toe walk. Incision is well approximated without drainage, swelling or erythema. Strength is 5/5 bilateral in the lower extremities. SLR is negative bilaterally. Sensation is intact to light touch to the bilateral lower extremities. + right calf pain to palpation, denies left calf pain to palpation. 1+ edema in the bilateral lower extremities. Dorsalis pedis pulses are intact bilaterally.     SLR with pain to the right calf.   + homans right       Medical Decision Making:   Impression:   1. S/P lumbar microdiscectomy  - Physical Therapy Referral - External  - OP REFERRAL TO [facility]  - US VENOUS DOPPLER LEG RIGHT - DIAG IMG (CPT=93971); Future    2. Right calf pain  - US VENOUS DOPPLER LEG RIGHT - DIAG IMG  (CPT=93971); Future      Plan:  Activity: Continue spine precautions. Increase ambulation as tolerated. To transition to outpatient physical therapy once discharged from home health physical therapy.  Order provided.   Incision:Okay to shower normal. Wait until full healing for submersion, usually in next 1-2 weeks. Okay for over the counter scar creams as needed. Reviewed gentle scar mobilization.  Medications: Continue OTC medications, PRN. With goal to be off opiate class medications by the 6 week post operative follow up visit.     STAT venous doppler right r/o DVT, if no DVT to start outpatient PT. If DVT, to report to ED for further treatment needs. Reviewed to go to ED for SOB/CP.     Follow up in 4  weeks for next routine post operative follow up.       Review treatment plan with the patient.  Return in about 4 weeks (around 8/5/2024), or if symptoms worsen or fail to improve.    Patient educated and verbalized understanding.  The patient indicates understanding of these issues and agrees to the plan.    Clarissa Brennan, SHANNANP-BC, RNFA  Collaborative: Mark Rebolledo MD  Orthopedic Spine Surgeon  [facility]   [address] [place] [address], [id number] [place] [id number]   t: [phone]   f: [phone]

## 2024-07-08 ENCOUNTER — OFFICE VISIT (OUTPATIENT)
Facility: CLINIC | Age: 54
End: 2024-07-08
Payer: COMMERCIAL

## 2024-07-08 ENCOUNTER — HOSPITAL ENCOUNTER (OUTPATIENT)
Dept: ULTRASOUND IMAGING | Age: 54
Discharge: HOME | End: 2024-07-08
Attending: NURSE PRACTITIONER
Payer: COMMERCIAL

## 2024-07-08 VITALS — BODY MASS INDEX: 42.85 KG/M2 | HEIGHT: 67 IN | WEIGHT: 273 LBS

## 2024-07-08 DIAGNOSIS — Z98.890 S/P LUMBAR MICRODISCECTOMY: Primary | ICD-10-CM

## 2024-07-08 DIAGNOSIS — M79.661 RIGHT CALF PAIN: ICD-10-CM

## 2024-07-08 DIAGNOSIS — Z98.890 S/P LUMBAR MICRODISCECTOMY: ICD-10-CM

## 2024-07-08 PROCEDURE — 93971 EXTREMITY STUDY: CPT | Performed by: NURSE PRACTITIONER

## 2024-07-08 PROCEDURE — 99024 POSTOP FOLLOW-UP VISIT: CPT | Performed by: NURSE PRACTITIONER

## 2024-07-08 NOTE — PATIENT INSTRUCTIONS
Okay for gentle massage at 4 weeks post op.     If no clot in the lower extremity okay to start physical therapy.

## 2024-07-09 ENCOUNTER — OFFICE VISIT (OUTPATIENT)
Dept: FAMILY MEDICINE CLINIC | Facility: CLINIC | Age: 54
End: 2024-07-09
Payer: COMMERCIAL

## 2024-07-09 ENCOUNTER — EKG ENCOUNTER (OUTPATIENT)
Dept: LAB | Age: 54
End: 2024-07-09
Attending: FAMILY MEDICINE
Payer: COMMERCIAL

## 2024-07-09 VITALS
HEIGHT: 67 IN | WEIGHT: 272 LBS | HEART RATE: 88 BPM | DIASTOLIC BLOOD PRESSURE: 66 MMHG | BODY MASS INDEX: 42.69 KG/M2 | SYSTOLIC BLOOD PRESSURE: 110 MMHG | OXYGEN SATURATION: 98 % | RESPIRATION RATE: 16 BRPM

## 2024-07-09 DIAGNOSIS — M54.16 LUMBAR RADICULOPATHY: ICD-10-CM

## 2024-07-09 DIAGNOSIS — F98.8 ATTENTION DEFICIT DISORDER (ADD) IN ADULT: ICD-10-CM

## 2024-07-09 DIAGNOSIS — Z87.891 FORMER TOBACCO USE: ICD-10-CM

## 2024-07-09 DIAGNOSIS — H65.23 CHRONIC SEROUS OTITIS MEDIA, BILATERAL: ICD-10-CM

## 2024-07-09 DIAGNOSIS — Z85.6 H/O: CML (CHRONIC MYELOID LEUKEMIA): ICD-10-CM

## 2024-07-09 DIAGNOSIS — Z86.010 HISTORY OF ADENOMATOUS POLYP OF COLON: ICD-10-CM

## 2024-07-09 DIAGNOSIS — Z85.820 HISTORY OF MELANOMA: ICD-10-CM

## 2024-07-09 DIAGNOSIS — Z98.84 LAP-BAND SURGERY STATUS: ICD-10-CM

## 2024-07-09 DIAGNOSIS — G47.33 OSA (OBSTRUCTIVE SLEEP APNEA): ICD-10-CM

## 2024-07-09 DIAGNOSIS — Z01.818 PREOP EXAMINATION: ICD-10-CM

## 2024-07-09 DIAGNOSIS — Z01.818 PREOP EXAMINATION: Primary | ICD-10-CM

## 2024-07-09 LAB
ATRIAL RATE: 82 BPM
P AXIS: 19 DEGREES
P-R INTERVAL: 154 MS
Q-T INTERVAL: 382 MS
QRS DURATION: 98 MS
QTC CALCULATION (BEZET): 446 MS
R AXIS: 37 DEGREES
T AXIS: 18 DEGREES
VENTRICULAR RATE: 82 BPM

## 2024-07-09 PROCEDURE — 93010 ELECTROCARDIOGRAM REPORT: CPT | Performed by: INTERNAL MEDICINE

## 2024-07-09 PROCEDURE — 93005 ELECTROCARDIOGRAM TRACING: CPT

## 2024-07-09 PROCEDURE — 99213 OFFICE O/P EST LOW 20 MIN: CPT | Performed by: FAMILY MEDICINE

## 2024-07-09 RX ORDER — LABETALOL 200 MG/1
200 TABLET, FILM COATED ORAL 2 TIMES DAILY
Qty: 180 TABLET | Refills: 0 | Status: SHIPPED | OUTPATIENT
Start: 2024-07-09

## 2024-07-09 NOTE — PROGRESS NOTES
Valerio Felipe is a 53 year old male who presents for preop clearance here with   Bilateral tympanostomy with tube placement    Dr. Kaur requesting clearance.  8/2/24  At [facility]   HPI:   Pt complains of hearing loss  Pt denies any chest pain/sob/dizziness or lightheadedness at rest or with exertion.  No previous reaction or problems with anesthesia.       Current Outpatient Medications   Medication Sig Dispense Refill    ATORVASTATIN 10 MG Oral Tab TAKE 1 TABLET(10 MG) BY MOUTH DAILY 90 tablet 0    SPIRONOLACTONE 50 MG Oral Tab TAKE 1 TABLET(50 MG) BY MOUTH DAILY 90 tablet 0    LOSARTAN 100 MG Oral Tab TAKE 1 TABLET(100 MG) BY MOUTH DAILY 90 tablet 0    Dasatinib (SPRYCEL) 100 MG Oral Tab Take 1 tablet by mouth daily.      amphetamine-dextroamphetamine (ADDERALL) 20 MG Oral Tab Take 1 tablet (20 mg total) by mouth 2 (two) times daily. 60 tablet 0    [START ON 7/13/2024] amphetamine-dextroamphetamine (ADDERALL) 20 MG Oral Tab Take 1 tablet (20 mg total) by mouth 2 (two) times daily. 60 tablet 0    [START ON 8/11/2024] amphetamine-dextroamphetamine (ADDERALL) 20 MG Oral Tab Take 1 tablet (20 mg total) by mouth 2 (two) times daily. 60 tablet 0    Labetalol HCl 200 MG Oral Tab Take 1 tablet (200 mg total) by mouth 2 (two) times daily. 60 tablet 3    Vitamin B-12 1000 MCG Oral Tab Take 1 tablet (1,000 mcg total) by mouth daily.      Cholecalciferol (VITAMIN D3) 1.25 MG (60232 UT) Oral Cap Take 1 tablet by mouth once a week.      Ascorbic Acid (VITAMIN C) 1000 MG Oral Tab Take 1 tablet (1,000 mg total) by mouth daily.        Past Medical History:    Back problem    Essential hypertension    High blood pressure    High cholesterol    Leukemia (HCC)    Leukemia (HCC)    Personal history of antineoplastic chemotherapy    oral chemo (Sprycel)    Smoking      Past Surgical History:   Procedure Laterality Date    Colonoscopy  07/2016    diverticulosis, polyps (adenoma/hp). repeat 5 years    Colonoscopy  N/A 2021    Procedure: COLONOSCOPY;  Surgeon: Fortino Maier MD;  Location:  ENDOSCOPY    Colonoscopy,biopsy N/A 2016    Procedure: COLONOSCOPY, POSSIBLE BIOPSY, POSSIBLE POLYPECTOMY 92939;  Surgeon: Fortino Maier MD;  Location: [facility]    Colonoscopy,remv lesn,snare N/A 2016    Procedure: COLONOSCOPY, POSSIBLE BIOPSY, POSSIBLE POLYPECTOMY 96736;  Surgeon: Fortino Maier MD;  Location: [facility]    Create eardrum opening,gen anesth  10/07/2021    Knee arthroscopy      Lap gastric bypass/hernando-en-y      Other surgical history      Rotator Cuff Repair left side    Other surgical history Right     meniscus      Family History   Problem Relation Age of Onset    Other (COPD [Other]) Father     Heart Attack Father     Stroke Father     Breast Cancer Mother     Diabetes Mother       Social History:  Social History     Socioeconomic History    Marital status:    Tobacco Use    Smoking status: Former     Current packs/day: 0.00     Types: Cigarettes     Quit date: 8/10/2018     Years since quittin.9    Smokeless tobacco: Never   Vaping Use    Vaping status: Never Used   Substance and Sexual Activity    Alcohol use: Yes     Comment: once per month    Drug use: Yes     Types: Cannabis     Comment: medical marijuana card   Other Topics Concern    Caffeine Concern Yes     Comment: 1 cup a day     Exercise Yes     Comment: 7 x week      Social Determinants of Health     Financial Resource Strain: Low Risk  (2024)    Financial Resource Strain     Difficulty of Paying Living Expenses: Not hard at all     Med Affordability: No   Food Insecurity: No Food Insecurity (2024)    Food Insecurity     Food Insecurity: Never true   Transportation Needs: No Transportation Needs (2024)    Transportation Needs     Lack of Transportation: No   Physical Activity: Inactive (2019)    Received from [facility], [facility]    Exercise  Vital Sign     Days of Exercise per Week: 0 days     Minutes of Exercise per Session: 0 min    Received from [facility], [facility]    Social Connections   Housing Stability: Low Risk  (6/5/2024)    Housing Stability     Housing Instability: No      Occ: . : . Children: .        REVIEW OF SYSTEMS:   GENERAL: feels well otherwise  SKIN: denies any unusual skin lesions  EYES:denies blurred vision or double vision  HEENT: denies nasal congestion, sinus pain or ST  LUNGS: denies shortness of breath with exertion  CARDIOVASCULAR: denies chest pain on exertion  GI: denies abdominal pain,denies heartburn  : denies nocturia or changes in stream  MUSCULOSKELETAL: denies back pain  NEURO: denies headaches  PSYCHE: denies depression or anxiety  HEMATOLOGIC: denies hx of anemia  ENDOCRINE: denies thyroid history  ALL/ASTHMA: denies hx of allergy or asthma    EXAM:   /66   Pulse 88   Resp 16   Ht 5' 7\" (1.702 m)   Wt 272 lb (123.4 kg)   SpO2 98%   BMI 42.60 kg/m²   Body mass index is 42.6 kg/m².   GENERAL: well developed, well nourished,in no apparent distress  SKIN: no rashes,no suspicious lesions  HEENT: atraumatic, normocephalic,ears and throat are clear  EYES:PERRLA, EOMI, normal optic disk,conjunctiva are clear  NECK: supple,no adenopathy,no bruits, no JVD  CHEST: no chest tenderness  BREAST: no dominant or suspicious mass  LUNGS: clear to auscultation  CARDIO: RRR without murmur  GI: good BS's,no masses, HSM or tenderness  MUSCULOSKELETAL: back is not tender,FROM of the back  EXTREMITIES: no cyanosis, clubbing or edema  NEURO: Oriented times three,cranial nerves are intact,motor and sensory are grossly intact    ASSESSMENT AND PLAN:   Valerio Felipe is a 53 year old male who presents for preop clearance.     1. Preop examination  Cleared for surgery after review of EKG   - EKG 12 Lead performed at [facility]; Future    2. Chronic serous otitis media,  bilateral    - EKG 12 Lead performed at [facility]; Future    3. H/O: CML (chronic myeloid leukemia)    - EKG 12 Lead performed at [facility]; Future    4. CHRIS (obstructive sleep apnea)    - EKG 12 Lead performed at [facility]; Future    5. LAP-BAND surgery status    - EKG 12 Lead performed at [facility]; Future    6. History of adenomatous polyp of colon    - EKG 12 Lead performed at [facility]; Future    7. History of melanoma    - EKG 12 Lead performed at [facility]; Future    8. Former tobacco use    - EKG 12 Lead performed at [facility]; Future    9. Lumbar radiculopathy    - EKG 12 Lead performed at [facility]; Future    10. Attention deficit disorder (ADD) in adult    - EKG 12 Lead performed at [facility]; Future    Pt is low-moderate risk for a low risk procedure.     RTC 3 mo or sooner if needed.

## 2024-07-24 ENCOUNTER — TELEPHONE (OUTPATIENT)
Facility: LOCATION | Age: 54
End: 2024-07-24

## 2024-07-24 NOTE — TELEPHONE ENCOUNTER
Discussed with patient.  He has history of lap band placement, this will result in abnormal upper GI anatomy.  Due to this and history of iron deficiency anemia would recommend patient have colonoscopy and EGD performed by gastroenterology.  Discussed with patient will give contact information for [facility].

## 2024-08-05 NOTE — PROGRESS NOTES
Valerio Felipe is a 53 year old male here to discuss ADD    S/p ear tubes  S/p spinal surgery   S/p surgery on both knees   Will have EGD and c-scope soon     Pt has cpap but not always wearing it     Patient denies chest pain, shortness of breath, dizziness, and lightheadedness. No exertional symptoms.  No palpitations     On adderall for control.  well controlled  no SI/HI  No insomnia/poor sleep  Normal appetite  No anhedonia  No hopelessness  No anxiety or depression   Denies side effects  Weight has been stable    HPI:     Current Outpatient Medications   Medication Sig Dispense Refill    labetalol 200 MG Oral Tab Take 1 tablet (200 mg total) by mouth 2 (two) times daily. 180 tablet 0    ATORVASTATIN 10 MG Oral Tab TAKE 1 TABLET(10 MG) BY MOUTH DAILY 90 tablet 0    SPIRONOLACTONE 50 MG Oral Tab TAKE 1 TABLET(50 MG) BY MOUTH DAILY 90 tablet 0    LOSARTAN 100 MG Oral Tab TAKE 1 TABLET(100 MG) BY MOUTH DAILY 90 tablet 0    Dasatinib (SPRYCEL) 100 MG Oral Tab Take 1 tablet by mouth daily.      amphetamine-dextroamphetamine (ADDERALL) 20 MG Oral Tab Take 1 tablet (20 mg total) by mouth 2 (two) times daily. 60 tablet 0    [START ON 8/11/2024] amphetamine-dextroamphetamine (ADDERALL) 20 MG Oral Tab Take 1 tablet (20 mg total) by mouth 2 (two) times daily. 60 tablet 0    Vitamin B-12 1000 MCG Oral Tab Take 1 tablet (1,000 mcg total) by mouth daily.      Cholecalciferol (VITAMIN D3) 1.25 MG (98997 UT) Oral Cap Take 1 tablet by mouth once a week.      Ascorbic Acid (VITAMIN C) 1000 MG Oral Tab Take 1 tablet (1,000 mg total) by mouth daily.        Past Medical History:    Back problem    Essential hypertension    High blood pressure    High cholesterol    Leukemia (HCC)    Leukemia (HCC)    Personal history of antineoplastic chemotherapy    oral chemo (Sprycel)    Smoking      Social History:  Social History     Socioeconomic History    Marital status:    Tobacco Use    Smoking status: Former     Current  packs/day: 0.00     Types: Cigarettes     Quit date: 8/10/2018     Years since quittin.9    Smokeless tobacco: Never   Vaping Use    Vaping status: Never Used   Substance and Sexual Activity    Alcohol use: Yes     Comment: once per month    Drug use: Yes     Types: Cannabis     Comment: medical marijuana card   Other Topics Concern    Caffeine Concern Yes     Comment: 1 cup a day     Exercise Yes     Comment: 7 x week      Social Determinants of Health     Financial Resource Strain: Low Risk  (2024)    Financial Resource Strain     Difficulty of Paying Living Expenses: Not hard at all     Med Affordability: No   Food Insecurity: No Food Insecurity (2024)    Food Insecurity     Food Insecurity: Never true   Transportation Needs: No Transportation Needs (2024)    Transportation Needs     Lack of Transportation: No   Physical Activity: Inactive (2019)    Received from [facility], [facility]    Exercise Vital Sign     Days of Exercise per Week: 0 days     Minutes of Exercise per Session: 0 min    Received from [facility], [facility]    Social Connections   Housing Stability: Low Risk  (2024)    Housing Stability     Housing Instability: No        REVIEW OF SYSTEMS:   GENERAL HEALTH: feels well otherwise  SKIN: denies any unusual skin lesions or rashes  RESPIRATORY: denies shortness of breath with exertion  CARDIOVASCULAR: denies chest pain on exertion  GI: denies abdominal pain and denies heartburn  NEURO: denies headaches    EXAM:   /72   Pulse 74   Resp 16   Ht 5' 7\" (1.702 m)   Wt 279 lb (126.6 kg)   SpO2 98%   BMI 43.70 kg/m²   GENERAL: well developed, well nourished,in no apparent distress  PSYCH: normal affect, good eye contact, appropriate  NEURO: CN intact  HEART: normal   LUNGS: clear  LE: no edema     ASSESSMENT AND PLAN:     1. History of laparoscopic adjustable gastric banding    - SURGICAL  BARIATRICS - INTERNAL    2. CHRIS (obstructive sleep apnea)      3. Attention deficit disorder (ADD) in adult  Cpm     4. History of melanoma    - Derm Referral - In Network    The patient indicates understanding of these issues and agrees to the plan.  The patient is asked to return in December for physical or sooner if needed

## 2024-08-06 ENCOUNTER — OFFICE VISIT (OUTPATIENT)
Dept: FAMILY MEDICINE CLINIC | Facility: CLINIC | Age: 54
End: 2024-08-06
Payer: COMMERCIAL

## 2024-08-06 VITALS
WEIGHT: 279 LBS | RESPIRATION RATE: 16 BRPM | BODY MASS INDEX: 43.79 KG/M2 | HEART RATE: 74 BPM | OXYGEN SATURATION: 98 % | DIASTOLIC BLOOD PRESSURE: 72 MMHG | SYSTOLIC BLOOD PRESSURE: 124 MMHG | HEIGHT: 67 IN

## 2024-08-06 DIAGNOSIS — G47.33 OSA (OBSTRUCTIVE SLEEP APNEA): ICD-10-CM

## 2024-08-06 DIAGNOSIS — Z85.820 HISTORY OF MELANOMA: ICD-10-CM

## 2024-08-06 DIAGNOSIS — F98.8 ATTENTION DEFICIT DISORDER (ADD) IN ADULT: ICD-10-CM

## 2024-08-06 DIAGNOSIS — Z98.84 HISTORY OF LAPAROSCOPIC ADJUSTABLE GASTRIC BANDING: Primary | ICD-10-CM

## 2024-08-06 PROCEDURE — 99214 OFFICE O/P EST MOD 30 MIN: CPT | Performed by: FAMILY MEDICINE

## 2024-08-08 ENCOUNTER — OFFICE VISIT (OUTPATIENT)
Dept: ORTHOPEDICS CLINIC | Facility: CLINIC | Age: 54
End: 2024-08-08
Payer: COMMERCIAL

## 2024-08-08 DIAGNOSIS — Z98.890 S/P LUMBAR MICRODISCECTOMY: Primary | ICD-10-CM

## 2024-08-08 PROCEDURE — 99024 POSTOP FOLLOW-UP VISIT: CPT | Performed by: NURSE PRACTITIONER

## 2024-08-08 NOTE — PROGRESS NOTES
Post op follow up    CC:   Chief Complaint   Patient presents with    Post-Op     Post op for L5-S1 Microdiscectomy  Patient states she's doing good   Patient has no concerns to address at this time        HPI:   Valerio Felipe is a 53 year old male with chief complaint of   Chief Complaint   Patient presents with    Post-Op     Post op for L5-S1 Microdiscectomy  Patient states she's doing good   Patient has no concerns to address at this time        Post op: Right L5-S1 microdicectomy     States appreciative of surgery and no concerns. Right leg remains improved, right gluteal pain resolved with ICE, neuroglide and HEP from his Nephew- PT with [facility].    Incision: denies concerns    Taking: OTC medications as needed    Denies CHEST PAIN/SOB/calf pain. Denies fever/chills/nausea/vomiting.     Current Medications:  Current Outpatient Medications   Medication Sig Dispense Refill    labetalol 200 MG Oral Tab Take 1 tablet (200 mg total) by mouth 2 (two) times daily. 180 tablet 0    ATORVASTATIN 10 MG Oral Tab TAKE 1 TABLET(10 MG) BY MOUTH DAILY 90 tablet 0    SPIRONOLACTONE 50 MG Oral Tab TAKE 1 TABLET(50 MG) BY MOUTH DAILY 90 tablet 0    LOSARTAN 100 MG Oral Tab TAKE 1 TABLET(100 MG) BY MOUTH DAILY 90 tablet 0    Dasatinib (SPRYCEL) 100 MG Oral Tab Take 1 tablet by mouth daily.      amphetamine-dextroamphetamine (ADDERALL) 20 MG Oral Tab Take 1 tablet (20 mg total) by mouth 2 (two) times daily. 60 tablet 0    [START ON 8/11/2024] amphetamine-dextroamphetamine (ADDERALL) 20 MG Oral Tab Take 1 tablet (20 mg total) by mouth 2 (two) times daily. 60 tablet 0    Vitamin B-12 1000 MCG Oral Tab Take 1 tablet (1,000 mcg total) by mouth daily.      Cholecalciferol (VITAMIN D3) 1.25 MG (19991 UT) Oral Cap Take 1 tablet by mouth once a week.      Ascorbic Acid (VITAMIN C) 1000 MG Oral Tab Take 1 tablet (1,000 mg total) by mouth daily.        HISTORY:  Past Medical History:    Back problem    Essential hypertension     High blood pressure    High cholesterol    Leukemia (HCC)    Leukemia (HCC)    Personal history of antineoplastic chemotherapy    oral chemo (Sprycel)    Smoking      Past Surgical History:   Procedure Laterality Date    Colonoscopy  2016    diverticulosis, polyps (adenoma/hp). repeat 5 years    Colonoscopy N/A 2021    Procedure: COLONOSCOPY;  Surgeon: Fortino Maier MD;  Location:  ENDOSCOPY    Colonoscopy,biopsy N/A 2016    Procedure: COLONOSCOPY, POSSIBLE BIOPSY, POSSIBLE POLYPECTOMY 43443;  Surgeon: Fortino Maier MD;  Location: [facility]    Colonoscopy,remv lesn,snare N/A 2016    Procedure: COLONOSCOPY, POSSIBLE BIOPSY, POSSIBLE POLYPECTOMY 16292;  Surgeon: Fortino Maier MD;  Location: [facility]    Create eardrum opening,gen anesth  10/07/2021    Knee arthroscopy      Lap gastric bypass/hernando-en-y      Other surgical history      Rotator Cuff Repair left side    Other surgical history Right     meniscus    Other surgical history Right 2024     Microdisectomy      Family History   Problem Relation Age of Onset    Other (COPD [Other]) Father     Heart Attack Father     Stroke Father     Breast Cancer Mother     Diabetes Mother       Social History     Socioeconomic History    Marital status:    Tobacco Use    Smoking status: Former     Current packs/day: 0.00     Types: Cigarettes     Quit date: 8/10/2018     Years since quittin.0    Smokeless tobacco: Never    Tobacco comments:     Updated 24   Vaping Use    Vaping status: Never Used   Substance and Sexual Activity    Alcohol use: Yes     Comment: once per month    Drug use: Yes     Types: Cannabis     Comment: medical marijuana card   Other Topics Concern    Caffeine Concern Yes     Comment: 1 cup a day     Exercise Yes     Comment: 7 x week      Social Determinants of Health     Financial Resource Strain: Low Risk  (2024)    Financial Resource Strain     Difficulty of Paying  Living Expenses: Not hard at all     Med Affordability: No   Food Insecurity: No Food Insecurity (6/5/2024)    Food Insecurity     Food Insecurity: Never true   Transportation Needs: No Transportation Needs (6/5/2024)    Transportation Needs     Lack of Transportation: No   Physical Activity: Inactive (2/18/2019)    Received from [facility], [facility]    Exercise Vital Sign     Days of Exercise per Week: 0 days     Minutes of Exercise per Session: 0 min    Received from [facility], [facility]    Social Connections   Housing Stability: Low Risk  (6/5/2024)    Housing Stability     Housing Instability: No        Exam     A&Ox4 in no acute distress. Seated on exam chair    Non Antalgic gait, able to normal heel and toe walk. Incision is well healed without drainage, swelling or erythema. Strength is 5/5 bilateral in the lower extremities. SLR is negative bilaterally. Sensation is intact to light touch to the bilateral lower extremities. Denies calf pain. No edema in the lower extremities. Dorsalis pedis pulses are intact bilaterally.     Medical Decision Making:   Impression:   1. S/P lumbar microdiscectomy      Plan:  Doing well post operative. No spine surgical restrictions. To wean into activities as tolerated.   Medications: continue OTC pain medications as needed.   Physical therapy: continue HEP and agree if needs formal PT in the future can review that at a follow up visit.   Follow up in clinic as needed.     Review treatment plan with the patient.  Return if symptoms worsen or fail to improve.    Patient educated and verbalized understanding.  The patient indicates understanding of these issues and agrees to the plan.    Clarissa Brennan, SHANNANP-BC, RNFA  Collaborative: Mark Rebolledo MD  Orthopedic Spine Surgeon  [facility]   [address] [place] [id number]    t: [phone]   f: [phone]

## 2024-09-03 ENCOUNTER — OFFICE VISIT (OUTPATIENT)
Dept: FAMILY MEDICINE CLINIC | Facility: CLINIC | Age: 54
End: 2024-09-03
Payer: COMMERCIAL

## 2024-09-03 VITALS
HEART RATE: 92 BPM | RESPIRATION RATE: 20 BRPM | WEIGHT: 270 LBS | BODY MASS INDEX: 42.38 KG/M2 | TEMPERATURE: 98 F | SYSTOLIC BLOOD PRESSURE: 135 MMHG | HEIGHT: 67 IN | DIASTOLIC BLOOD PRESSURE: 88 MMHG | OXYGEN SATURATION: 97 %

## 2024-09-03 DIAGNOSIS — J01.41 ACUTE RECURRENT PANSINUSITIS: Primary | ICD-10-CM

## 2024-09-03 PROCEDURE — 99213 OFFICE O/P EST LOW 20 MIN: CPT | Performed by: NURSE PRACTITIONER

## 2024-09-03 NOTE — PROGRESS NOTES
CHIEF COMPLAINT:     Chief Complaint   Patient presents with    Sinus Problem     X 9 days, yellow drainage, R ear pain, no otc       HPI:   Valerio Felipe is a 53 year old male who presents for sinus congestion for  9  days. Symptoms have been worsening since onset. Sinus congestion/pain is described as a pressure and is located mainly across forehead and across cheeks, also reports some ear pressure in left ear.   Reports yellow nasal discharge.  Nothing makes symptoms better. Has not treated symptoms. Patient also reports raspy and nasal voice,cough, fullness in ears and pain in right ear, sore throat only at the beginning of sx's, congestion, fever with Tmax to 100, cough with yellow colored sputum, cough is keeping pt up at night, sinus pain.  Denies dental pain, tinnitus, N/V, confusion, neck pain, visual disturbance.       Current Outpatient Medications   Medication Sig Dispense Refill    amoxicillin clavulanate 875-125 MG Oral Tab Take 1 tablet by mouth 2 (two) times daily for 7 days. 14 tablet 0    labetalol 200 MG Oral Tab Take 1 tablet (200 mg total) by mouth 2 (two) times daily. 180 tablet 0    ATORVASTATIN 10 MG Oral Tab TAKE 1 TABLET(10 MG) BY MOUTH DAILY 90 tablet 0    SPIRONOLACTONE 50 MG Oral Tab TAKE 1 TABLET(50 MG) BY MOUTH DAILY 90 tablet 0    LOSARTAN 100 MG Oral Tab TAKE 1 TABLET(100 MG) BY MOUTH DAILY 90 tablet 0    amphetamine-dextroamphetamine (ADDERALL) 20 MG Oral Tab Take 1 tablet (20 mg total) by mouth 2 (two) times daily. 60 tablet 0    Vitamin B-12 1000 MCG Oral Tab Take 1 tablet (1,000 mcg total) by mouth daily.      Ascorbic Acid (VITAMIN C) 1000 MG Oral Tab Take 1 tablet (1,000 mg total) by mouth daily.      Dasatinib (SPRYCEL) 100 MG Oral Tab Take 1 tablet by mouth daily.      Cholecalciferol (VITAMIN D3) 1.25 MG (63261 UT) Oral Cap Take 1 tablet by mouth once a week.        Past Medical History:    Back problem    Essential hypertension    High blood pressure    High cholesterol     Leukemia (HCC)    Leukemia (HCC)    Personal history of antineoplastic chemotherapy    oral chemo (Sprycel)    Smoking      Past Surgical History:   Procedure Laterality Date    Colonoscopy  2016    diverticulosis, polyps (adenoma/hp). repeat 5 years    Colonoscopy N/A 2021    Procedure: COLONOSCOPY;  Surgeon: Fortino Maier MD;  Location:  ENDOSCOPY    Colonoscopy,biopsy N/A 2016    Procedure: COLONOSCOPY, POSSIBLE BIOPSY, POSSIBLE POLYPECTOMY 46508;  Surgeon: Fortino Maier MD;  Location: [facility]    Colonoscopy,remv lesn,snare N/A 2016    Procedure: COLONOSCOPY, POSSIBLE BIOPSY, POSSIBLE POLYPECTOMY 30870;  Surgeon: Fortino Maier MD;  Location: [facility]    Create eardrum opening,gen anesth  10/07/2021    Knee arthroscopy      Lap gastric bypass/hernando-en-y      Other surgical history      Rotator Cuff Repair left side    Other surgical history Right     meniscus    Other surgical history Right 2024     Microdisectomy      Family History   Problem Relation Age of Onset    Other (COPD [Other]) Father     Heart Attack Father     Stroke Father     Breast Cancer Mother     Diabetes Mother       Social History     Socioeconomic History    Marital status:    Tobacco Use    Smoking status: Former     Current packs/day: 0.00     Types: Cigarettes     Quit date: 8/10/2018     Years since quittin.0    Smokeless tobacco: Never    Tobacco comments:     Updated 24   Vaping Use    Vaping status: Never Used   Substance and Sexual Activity    Alcohol use: Yes     Comment: once per month    Drug use: Yes     Types: Cannabis     Comment: medical marijuana card   Other Topics Concern    Caffeine Concern Yes     Comment: 1 cup a day     Exercise Yes     Comment: 7 x week      Social Determinants of Health     Financial Resource Strain: Low Risk  (2024)    Financial Resource Strain     Difficulty of Paying Living Expenses: Not hard at all     Med  Affordability: No   Food Insecurity: No Food Insecurity (6/5/2024)    Food Insecurity     Food Insecurity: Never true   Transportation Needs: No Transportation Needs (6/5/2024)    Transportation Needs     Lack of Transportation: No   Physical Activity: Inactive (2/18/2019)    Received from [facility], [facility]    Exercise Vital Sign     Days of Exercise per Week: 0 days     Minutes of Exercise per Session: 0 min    Received from [facility], [facility]    Social Connections   Housing Stability: Low Risk  (6/5/2024)    Housing Stability     Housing Instability: No         REVIEW OF SYSTEMS:   GENERAL: feels well otherwise, no unplanned weight change,  decreased appetite  SKIN: no rashes or abnormal skin lesions  HEENT: See HPI.    LUNGS: denies shortness of breath or wheezing, See HPI  CARDIOVASCULAR: denies chest pain or palpitations   GI: denies N/V/C or abdominal pain  NEURO:   No numbness or tingling in face.    EXAM:   /88   Pulse 92   Temp 97.6 °F (36.4 °C)   Resp 20   Ht 5' 7\" (1.702 m)   Wt 270 lb (122.5 kg)   SpO2 97%   BMI 42.29 kg/m²   GENERAL: well developed, well nourished,in no apparent distress  SKIN: no rashes,no suspicious lesions  HEAD: atraumatic, normocephalic, +  tenderness on palpation of maxillary and frontal sinuses.  EYES: conjunctiva clear, EOM intact  EARS: TM's: Bilateral:ET tubes present, left TM:no erythema, no bulging, no retraction, landmarks obscured. Right TM erythematous, bony landmarks obscured  NOSE: nostrils patent, yellow nasal mucous, nasal mucosa reddened and edematous.   THROAT: oral mucosa pink, moist. No visible dental caries. Posterior pharynx is  erythematous. no exudates.  NECK: supple, non-tender  LUNGS: clear to auscultation bilaterally, no wheezes or rhonchi. Breathing is non labored.  CARDIO: RRR without murmur  EXTREMITIES: no cyanosis, clubbing or edema  LYMPH:  No  lymphadenopathy.        ASSESSMENT AND PLAN:   ASSESSMENT:  Valerio Felipe is a 53 year old male who presents with sinus symptoms for 9 days, also having left ear pain, recent ET placement    ASSESSMENT:   Valerio was seen today for sinus problem.    Diagnoses and all orders for this visit:    Acute recurrent pansinusitis    Other orders  -     amoxicillin clavulanate 875-125 MG Oral Tab; Take 1 tablet by mouth 2 (two) times daily for 7 days.          PLAN:   To follow up with ENT regarding ear pain if not resolved following abx.   Will treat sinus infection with abx.   If you develop worsening, new, changing or persistent symptoms or swelling of the face, swelling and pain around the eye, worsening cough with fever, shortness of breath, chest or back pain or wheezing, neck pain/siffness, nausea/vomiting please seek immediate care at the ER as these can be signs of more serious illness requiring emergent evaluation and treatment  1. Take augmentin antibiotic as prescribed  2. Drink plenty of fluids and rest  3. A cool mist humidifier in room especially at night may help, or breathing in steam from warm shower  4. You may use saline nasal spray in each nare and gently blow nose following  5. After using saline spray you may use Flonase or Nasacort (OTC) nasal spray one to two sprays in each nostril  6. If your symptoms do not improve with advised treatments, or you develop uncontrollable fevers, nausea, vomiting, diarrhea, decreased fluid intake, shortness of breath, wheezing, follow-up immediately. Otherwise, follow up with your primary care physician within 2 weeks.   Meds as below.    Comfort care instructions as listed in Patient Instructions    Meds & Refills for this Visit:  Requested Prescriptions     Signed Prescriptions Disp Refills    amoxicillin clavulanate 875-125 MG Oral Tab 14 tablet 0     Sig: Take 1 tablet by mouth 2 (two) times daily for 7 days.       Risks, benefits, side effects of medication  addressed and explained.    The patient indicates understanding of these issues and agrees to the plan.  The patient is asked to return if sx's persist or worsen.

## 2024-09-13 ENCOUNTER — HOSPITAL ENCOUNTER (OUTPATIENT)
Age: 54
Discharge: HOME | End: 2024-09-13
Payer: COMMERCIAL

## 2024-09-13 ENCOUNTER — APPOINTMENT (OUTPATIENT)
Dept: GENERAL RADIOLOGY | Age: 54
End: 2024-09-13
Attending: NURSE PRACTITIONER
Payer: COMMERCIAL

## 2024-09-13 VITALS
BODY MASS INDEX: 40.81 KG/M2 | SYSTOLIC BLOOD PRESSURE: 117 MMHG | HEIGHT: 67 IN | OXYGEN SATURATION: 95 % | WEIGHT: 260 LBS | TEMPERATURE: 98 F | RESPIRATION RATE: 18 BRPM | HEART RATE: 88 BPM | DIASTOLIC BLOOD PRESSURE: 67 MMHG

## 2024-09-13 DIAGNOSIS — R06.2 WHEEZING: ICD-10-CM

## 2024-09-13 DIAGNOSIS — R05.8 PRODUCTIVE COUGH: ICD-10-CM

## 2024-09-13 DIAGNOSIS — R09.81 SINUS CONGESTION: Primary | ICD-10-CM

## 2024-09-13 PROCEDURE — 94640 AIRWAY INHALATION TREATMENT: CPT

## 2024-09-13 PROCEDURE — 99214 OFFICE O/P EST MOD 30 MIN: CPT

## 2024-09-13 PROCEDURE — 71046 X-RAY EXAM CHEST 2 VIEWS: CPT | Performed by: NURSE PRACTITIONER

## 2024-09-13 RX ORDER — MOMETASONE FUROATE MONOHYDRATE 50 UG/1
1 SPRAY, METERED NASAL DAILY
Qty: 1 EACH | Refills: 0 | Status: SHIPPED | OUTPATIENT
Start: 2024-09-13

## 2024-09-13 RX ORDER — ALBUTEROL SULFATE 90 UG/1
2 INHALANT RESPIRATORY (INHALATION)
Qty: 1 EACH | Refills: 0 | Status: SHIPPED | OUTPATIENT
Start: 2024-09-13

## 2024-09-13 RX ORDER — IPRATROPIUM BROMIDE AND ALBUTEROL SULFATE 2.5; .5 MG/3ML; MG/3ML
3 SOLUTION RESPIRATORY (INHALATION) ONCE
Status: COMPLETED | OUTPATIENT
Start: 2024-09-13 | End: 2024-09-13

## 2024-09-13 NOTE — ED PROVIDER NOTES
History     Chief Complaint   Patient presents with    Cough     Ear infection - Entered by patient       Subjective:   HPI    Valerio Felipe, 53 year old male with notable medical history of chronic otitis media with tubes (recent new tube placement), HTN, obesity, CHRIS who presents with productive cough and SOB. PATIENT reports recurrent otitis media r/t tube placement and being evaluated by [facility] on 9/3/24 and given Rx for Augmentin for pansinusitis. Patient reports ear discharge has improved and only some at night, but that he is having sinus drainage and chest congestion with productive cough with mild SOB when ascending multiple sets of stairs. Denies fever, chills, n/v.        Objective:   Past Medical History:    Back problem    Essential hypertension    High blood pressure    High cholesterol    Leukemia (HCC)    Leukemia (HCC)    Personal history of antineoplastic chemotherapy    oral chemo (Sprycel)    Smoking              Past Surgical History:   Procedure Laterality Date    Colonoscopy  07/2016    diverticulosis, polyps (adenoma/hp). repeat 5 years    Colonoscopy N/A 05/17/2021    Procedure: COLONOSCOPY;  Surgeon: Fortino Maier MD;  Location:  ENDOSCOPY    Colonoscopy,biopsy N/A 07/11/2016    Procedure: COLONOSCOPY, POSSIBLE BIOPSY, POSSIBLE POLYPECTOMY 82391;  Surgeon: Fortino Maier MD;  Location: [facility]    Colonoscopy,remv lesn,snare N/A 07/11/2016    Procedure: COLONOSCOPY, POSSIBLE BIOPSY, POSSIBLE POLYPECTOMY 26649;  Surgeon: Fortino Maier MD;  Location: [facility]    Create eardrum opening,gen anesth  10/07/2021    Knee arthroscopy      Lap gastric bypass/hernando-en-y      Other surgical history  2005    Rotator Cuff Repair left side    Other surgical history Right     meniscus    Other surgical history Right 06/19/2024     Microdisectomy                Social History     Socioeconomic History    Marital status:    Tobacco Use    Smoking status: Former      Current packs/day: 0.00     Types: Cigarettes     Quit date: 8/10/2018     Years since quittin.0    Smokeless tobacco: Never    Tobacco comments:     Updated 24   Vaping Use    Vaping status: Never Used   Substance and Sexual Activity    Alcohol use: Yes     Comment: once per month    Drug use: Yes     Types: Cannabis     Comment: medical marijuana card   Other Topics Concern    Caffeine Concern Yes     Comment: 1 cup a day     Exercise Yes     Comment: 7 x week      Social Determinants of Health     Financial Resource Strain: Low Risk  (2024)    Financial Resource Strain     Difficulty of Paying Living Expenses: Not hard at all     Med Affordability: No   Food Insecurity: No Food Insecurity (2024)    Food Insecurity     Food Insecurity: Never true   Transportation Needs: No Transportation Needs (2024)    Transportation Needs     Lack of Transportation: No   Physical Activity: Inactive (2019)    Received from [facility], [facility]    Exercise Vital Sign     Days of Exercise per Week: 0 days     Minutes of Exercise per Session: 0 min    [facility] from [facility], [facility]    Social Connections   Housing Stability: Low Risk  (2024)    Housing Stability     Housing Instability: No              Current Facility-Administered Medications on File Prior to Encounter   Medication Dose Route Frequency Provider Last Rate Last Admin    [COMPLETED] ceFAZolin (Ancef) 2g in 10mL IV syringe premix  2 g Intravenous Once Mark Rebolledo MD   3 g at 24 1450    [COMPLETED] acetaminophen (Ofirmev) 10 mg/mL infusion premix 1,000 mg  1,000 mg Intravenous Once Virgilio Ceballos MD   Stopped at 24 1731    [COMPLETED] diazePAM (Valium) tab 10 mg  10 mg Oral Once Virgilio Ceballos MD   10 mg at 24 1810    [COMPLETED] methocarbamol (Robaxin) 1000 MG/10ML injection 1,000 mg  1,000 mg Intravenous Once Virgilio Ceballos MD    1,000 mg at 24 1702     Current Outpatient Medications on File Prior to Encounter   Medication Sig Dispense Refill    [] amoxicillin clavulanate 875-125 MG Oral Tab Take 1 tablet by mouth 2 (two) times daily for 7 days. 14 tablet 0    labetalol 200 MG Oral Tab Take 1 tablet (200 mg total) by mouth 2 (two) times daily. 180 tablet 0    ATORVASTATIN 10 MG Oral Tab TAKE 1 TABLET(10 MG) BY MOUTH DAILY 90 tablet 0    SPIRONOLACTONE 50 MG Oral Tab TAKE 1 TABLET(50 MG) BY MOUTH DAILY 90 tablet 0    LOSARTAN 100 MG Oral Tab TAKE 1 TABLET(100 MG) BY MOUTH DAILY 90 tablet 0    Dasatinib (SPRYCEL) 100 MG Oral Tab Take 1 tablet by mouth daily.      [] amphetamine-dextroamphetamine (ADDERALL) 20 MG Oral Tab Take 1 tablet (20 mg total) by mouth 2 (two) times daily. 60 tablet 0    [] amphetamine-dextroamphetamine (ADDERALL) 20 MG Oral Tab Take 1 tablet (20 mg total) by mouth 2 (two) times daily. 60 tablet 0    [] amphetamine-dextroamphetamine (ADDERALL) 20 MG Oral Tab Take 1 tablet (20 mg total) by mouth 2 (two) times daily. 60 tablet 0    Vitamin B-12 1000 MCG Oral Tab Take 1 tablet (1,000 mcg total) by mouth daily.      Cholecalciferol (VITAMIN D3) 1.25 MG (16155 UT) Oral Cap Take 1 tablet by mouth once a week.      Ascorbic Acid (VITAMIN C) 1000 MG Oral Tab Take 1 tablet (1,000 mg total) by mouth daily.           Review of Systems   Respiratory:  Positive for cough and shortness of breath.    All other systems reviewed and are negative.        Constitutional and vital signs reviewed.      All other systems reviewed and negative except as noted above.    I have reviewed the family history, social history, allergies, and outpatient medications.     History reviewed from EMR: Encounters, problem list, allergies, medications      Physical Exam     ED Triage Vitals [24 1038]   /67   Pulse 88   Resp 18   Temp 97.6 °F (36.4 °C)   Temp src Temporal   SpO2 95 %   O2 Device None (Room air)        Current:/67   Pulse 88   Temp 97.6 °F (36.4 °C) (Temporal)   Resp 18   Ht 170.2 cm (5' 7\")   Wt 117.9 kg   SpO2 95%   BMI 40.72 kg/m²       Physical Exam  Vitals and nursing note reviewed.   Constitutional:       General: He is not in acute distress.     Appearance: Normal appearance. He is obese. He is not ill-appearing or toxic-appearing.   HENT:      Head: Normocephalic and atraumatic.      Right Ear: External ear normal.      Left Ear: External ear normal.      Ears:      Comments: Bilateral tubes noted. No gross discharge noted.     Nose: Rhinorrhea present. No congestion.      Mouth/Throat:      Mouth: Mucous membranes are moist.   Eyes:      Extraocular Movements: Extraocular movements intact.      Conjunctiva/sclera: Conjunctivae normal.      Pupils: Pupils are equal, round, and reactive to light.   Cardiovascular:      Rate and Rhythm: Normal rate and regular rhythm.      Pulses: Normal pulses.   Pulmonary:      Effort: Pulmonary effort is normal. No respiratory distress.      Breath sounds: Wheezing present.      Comments: Course wheezing throughout. +RLL rhonchi  Musculoskeletal:         General: No swelling, tenderness or signs of injury. Normal range of motion.      Cervical back: Normal range of motion.   Skin:     General: Skin is warm and dry.      Capillary Refill: Capillary refill takes less than 2 seconds.   Neurological:      General: No focal deficit present.      Mental Status: He is alert and oriented to person, place, and time. Mental status is at baseline.   Psychiatric:         Mood and Affect: Mood normal.         Behavior: Behavior normal.         Thought Content: Thought content normal.         Judgment: Judgment normal.            ED Course     Labs Reviewed - No data to display  XR CHEST PA + LAT CHEST (CPT=71046)   Final Result   PROCEDURE:  XR CHEST PA + LAT CHEST (CPT=71046)       INDICATIONS:  productive cough, RLL rhonchi       COMPARISON:  [facility], XR, XR  CHEST PA + LAT CHEST (CPT=71046),    10/12/2023, 12:52 PM.       TECHNIQUE:  PA and lateral chest radiographs were obtained.       PATIENT STATED HISTORY: (As transcribed by Technologist)  Patient states    he has had a productive cough for one month.            FINDINGS:     LUNGS:  No focal consolidation.  Normal vascularity.   CARDIAC:  Normal size cardiac silhouette.   MEDIASTINUM:  Normal.   PLEURA:  Normal.  No pleural effusions.   BONES:  Normal for age.                         =====   CONCLUSION:  There is no evidence of active cardiopulmonary disease.           LOCATION:  [facility]           Dictated by (CST): Charles Polk MD on 9/13/2024 at 11:44 AM        Finalized by (CST): Charles Polk MD on 9/13/2024 at 11:45 AM             Vitals:    09/13/24 1038   BP: 117/67   Pulse: 88   Resp: 18   Temp: 97.6 °F (36.4 °C)   TempSrc: Temporal   SpO2: 95%   Weight: 117.9 kg   Height: 170.2 cm (5' 7\")            MYLES Felipe, 53 year old male with medical history as noted above who presents with cough, SOB   - Patient in NAD, VSS   - bronchitis vs PNA vs other   - CXR, duo neb w/ mask to aid with sinuses, reassess       ** See ED course below for additional information on care provided / interventions / notable events throughout patient's encounter.    ED Course as of 09/13/24 1207  ------------------------------------------------------------  Time: 09/13 1149  Comment: Self read of imaging w/o obvious acute process. Awaiting official read.    ------------------------------------------------------------  Time: 09/13 1206  Comment: CXR w/o acute process  Patient reports symptoms improvement s/p neb  Supportive care and infection control measures discussed  F/u with care team as needed       ** I have independently reviewed the radiology images, clinical lab results, and ECG tracings as described above (if applicable)    ** Concerning co-morbidities possibly affecting complaint / care: Hx recurrent otitis  media w/ tubes, CHRIS    ** See below for home care instructions (if applicable)          Medical Decision Making  Amount and/or Complexity of Data Reviewed  Radiology: ordered and independent interpretation performed. Decision-making details documented in ED Course.    Risk  OTC drugs.  Prescription drug management.        Disposition and Plan     Clinical Impression:  1. Sinus congestion    2. Productive cough    3. Wheezing         Disposition:  Discharge  9/13/2024 12:06 pm    Follow-up:  Ayse Luo DO  [address] [address]  [phone]      As needed          Medications Prescribed:  Current Discharge Medication List        START taking these medications    Details   mometasone furoate 50 MCG/ACT Nasal Suspension 1 spray by Nasal route daily.  Qty: 1 each, Refills: 0    Comments: OK to substitute volume based on availability      albuterol 108 (90 Base) MCG/ACT Inhalation Aero Soln Inhale 2 puffs into the lungs every 4 to 6 hours as needed for Wheezing.  Qty: 1 each, Refills: 0             The above patient (and/or guardian) was made aware that an appropriate evaluation has been performed, and that no additional testing is required at this time. In my medical judgment, there is currently no evidence of an immediate life-threatening or surgical condition, therefore discharge is indicated at this time. The patient (and/or guardian) was advised that a small risk still exists that a serious condition could develop. The patient was instructed to arrange close follow-up with their primary care provider (or the referral provider given today). The patient received written and verbal instructions regarding their condition / concerns, demonstrated understanding, and is agreement with the outpatient treatment plan.        Home care instructions:     - Use nasal spray as directed: 1-spray into each nostril daily. Use with head tilted down and tip pointed towards outside of eye. Breath normally. You  should not feel medication go down your throat.    Use inhaler (1-2 puffs every 4-6 hours) with spacer as needed for chest tightness, wheezing, and/or bronchospasm     - You may benefit from taking a daily allergy medication (e.g. Zyrtec, Xyzal, etc.)    Upper respiratory infection supportive care measures to try as applicable:  General:   - Wash hands often   - Disinfect your environment, linens, electronics, etc.   - Drink plenty of fluids (water, Pedialyte, etc.)   - Get plenty of rest and sleep with head elevated to help with sinus drainage and throat irritation   - Avoid having air blow on your face as this can worsen congestion / cough   - Do not share utensils or drinks   - Alternate Ibuprofen (adult: 600mg) and Tylenol (adult: 650-1000mg) as needed for pain / body aches / fever   - Symptoms may take a few weeks to resolve   - You may benefit from taking a daily multivitamin   - You may benefit from Zinc (~20mg) every other day for a week while sick   - You may benefit from Vitamin D daily (~2000u)   - Change toothbrush    Sore throat:   - Salt water gargles throughout the day for sore throat   - Cepacol lozenges as needed for sore throat    Cough / Sinus:   - You may benefit from spoonfuls (and/or added to warm drinks) of honey throughout the day for cough   - You may benefit from taking a decongestant (e.g. Sudafed - pseudoephedrine [behind the pharmacy counter]) (may temporarily elevate your heart rate and blood pressure)   - You may benefit from using a humidifier and/or steam showers   - You may benefit from boiling water with lemon and cayenne pepper, then breathing in the steam (you can cover your head with a towel to help funnel the steam)      Irving Potts, DNP, APRN, AGACNP-BC, FNP-C, CNL  Adult-Gerontology Acute Care & Family Nurse Practitioner  [facility]

## 2024-09-13 NOTE — DISCHARGE INSTRUCTIONS
- Use nasal spray as directed: 1-spray into each nostril daily. Use with head tilted down and tip pointed towards outside of eye. Breath normally. You should not feel medication go down your throat.    Use inhaler (1-2 puffs every 4-6 hours) with spacer as needed for chest tightness, wheezing, and/or bronchospasm     - You may benefit from taking a daily allergy medication (e.g. Zyrtec, Xyzal, etc.)    Upper respiratory infection supportive care measures to try as applicable:  General:   - Wash hands often   - Disinfect your environment, linens, electronics, etc.   - Drink plenty of fluids (water, Pedialyte, etc.)   - Get plenty of rest and sleep with head elevated to help with sinus drainage and throat irritation   - Avoid having air blow on your face as this can worsen congestion / cough   - Do not share utensils or drinks   - Alternate Ibuprofen (adult: 600mg) and Tylenol (adult: 650-1000mg) as needed for pain / body aches / fever   - Symptoms may take a few weeks to resolve   - You may benefit from taking a daily multivitamin   - You may benefit from Zinc (~20mg) every other day for a week while sick   - You may benefit from Vitamin D daily (~2000u)   - Change toothbrush    Sore throat:   - Salt water gargles throughout the day for sore throat   - Cepacol lozenges as needed for sore throat    Cough / Sinus:   - You may benefit from spoonfuls (and/or added to warm drinks) of honey throughout the day for cough   - You may benefit from taking a decongestant (e.g. Sudafed - pseudoephedrine [behind the pharmacy counter]) (may temporarily elevate your heart rate and blood pressure)   - You may benefit from using a humidifier and/or steam showers   - You may benefit from boiling water with lemon and cayenne pepper, then breathing in the steam (you can cover your head with a towel to help funnel the steam)

## 2024-09-13 NOTE — ED INITIAL ASSESSMENT (HPI)
Pt states he's been having a cough for about a mouth, was seen at a walk in clinic given antibiotics for his ear infection ( he has tubes on both ears) pt states he can feel/see drainage from ears and states draining in his throat and gone into his chest. Coughing up green mucus. Denies having fevers.

## 2024-09-16 PROBLEM — I10 BENIGN ESSENTIAL HYPERTENSION: Status: ACTIVE | Noted: 2024-09-16

## 2024-09-16 PROBLEM — R29.898 RIGHT ARM WEAKNESS: Status: ACTIVE | Noted: 2020-04-22

## 2024-09-16 PROBLEM — G89.29 CHRONIC PAIN: Status: ACTIVE | Noted: 2024-09-16

## 2024-09-16 PROBLEM — F32.9 MAJOR DEPRESSIVE DISORDER, SINGLE EPISODE, UNSPECIFIED: Status: ACTIVE | Noted: 2024-09-16

## 2024-09-16 PROBLEM — D64.9 ANEMIA: Status: ACTIVE | Noted: 2023-11-07

## 2024-09-16 PROBLEM — M54.12 CERVICAL RADICULAR PAIN: Status: ACTIVE | Noted: 2019-12-05

## 2024-09-16 PROBLEM — H66.002 ACUTE SUPPURATIVE OTITIS MEDIA OF LEFT EAR WITHOUT SPONTANEOUS RUPTURE OF TYMPANIC MEMBRANE: Status: ACTIVE | Noted: 2019-09-25

## 2024-09-16 PROBLEM — G95.9 MYELOPATHY (HCC): Status: ACTIVE | Noted: 2019-12-05

## 2024-09-16 PROBLEM — C95.90 LEUKEMIA (HCC): Status: ACTIVE | Noted: 2024-09-16

## 2024-09-16 PROBLEM — R32 URINARY INCONTINENCE: Status: ACTIVE | Noted: 2024-09-16

## 2024-09-27 RX ORDER — ATORVASTATIN CALCIUM 10 MG/1
10 TABLET, FILM COATED ORAL DAILY
Qty: 90 TABLET | Refills: 0 | Status: SHIPPED | OUTPATIENT
Start: 2024-09-27

## 2024-09-27 RX ORDER — SPIRONOLACTONE 50 MG/1
50 TABLET, FILM COATED ORAL DAILY
Qty: 90 TABLET | Refills: 0 | Status: SHIPPED | OUTPATIENT
Start: 2024-09-27

## 2024-09-27 RX ORDER — LOSARTAN POTASSIUM 100 MG/1
100 TABLET ORAL DAILY
Qty: 90 TABLET | Refills: 0 | Status: SHIPPED | OUTPATIENT
Start: 2024-09-27

## 2024-09-27 RX ORDER — SPIRONOLACTONE 50 MG/1
50 TABLET, FILM COATED ORAL DAILY
Qty: 90 TABLET | Refills: 0 | Status: SHIPPED | OUTPATIENT
Start: 2024-09-27 | End: 2024-09-27

## 2024-10-01 PROBLEM — D50.9 IRON DEFICIENCY ANEMIA, UNSPECIFIED: Status: ACTIVE | Noted: 2024-10-01

## 2024-10-01 PROBLEM — Z86.0101 HISTORY OF ADENOMATOUS POLYP OF COLON: Status: ACTIVE | Noted: 2024-10-01

## 2024-10-01 PROBLEM — D12.0 BENIGN NEOPLASM OF CECUM: Status: ACTIVE | Noted: 2024-10-01

## 2024-10-15 DIAGNOSIS — F98.8 ATTENTION DEFICIT DISORDER (ADD) IN ADULT: ICD-10-CM

## 2024-10-15 RX ORDER — LABETALOL 200 MG/1
200 TABLET, FILM COATED ORAL 2 TIMES DAILY
Qty: 180 TABLET | Refills: 0 | Status: SHIPPED | OUTPATIENT
Start: 2024-10-15

## 2024-10-15 NOTE — TELEPHONE ENCOUNTER
amphetamine-dextroamphetamine 20 MG Oral Tab     [id number] - [place], [place] - [facility] AT [facility] [address], [phone], [phone]

## 2024-10-15 NOTE — TELEPHONE ENCOUNTER
Requesting   Requested Prescriptions     Pending Prescriptions Disp Refills    amphetamine-dextroamphetamine 20 MG Oral Tab 60 tablet 0     Sig: Take 1 tablet (20 mg total) by mouth 2 (two) times daily.       LOV: 8/6/24  Filled: 9/13/24 #60 with 0 refills    Future Appointments   Date Time Provider Department Center   12/3/2024 12:30 PM Alonso Nassar MD [facility] SUB GI   12/10/2024 10:00 AM Ayse uLo DO [facility] [address]

## 2024-10-17 RX ORDER — DEXTROAMPHETAMINE SACCHARATE, AMPHETAMINE ASPARTATE, DEXTROAMPHETAMINE SULFATE AND AMPHETAMINE SULFATE 5; 5; 5; 5 MG/1; MG/1; MG/1; MG/1
20 TABLET ORAL 2 TIMES DAILY
Qty: 60 TABLET | Refills: 0 | Status: SHIPPED | OUTPATIENT
Start: 2024-11-15 | End: 2024-12-15

## 2024-10-17 RX ORDER — DEXTROAMPHETAMINE SACCHARATE, AMPHETAMINE ASPARTATE, DEXTROAMPHETAMINE SULFATE AND AMPHETAMINE SULFATE 5; 5; 5; 5 MG/1; MG/1; MG/1; MG/1
20 TABLET ORAL 2 TIMES DAILY
Qty: 60 TABLET | Refills: 0 | Status: SHIPPED | OUTPATIENT
Start: 2024-10-17 | End: 2024-11-16

## 2024-10-17 RX ORDER — DEXTROAMPHETAMINE SACCHARATE, AMPHETAMINE ASPARTATE, DEXTROAMPHETAMINE SULFATE AND AMPHETAMINE SULFATE 5; 5; 5; 5 MG/1; MG/1; MG/1; MG/1
20 TABLET ORAL 2 TIMES DAILY
Qty: 60 TABLET | Refills: 0 | Status: SHIPPED | OUTPATIENT
Start: 2024-12-16 | End: 2025-01-15

## 2024-10-30 NOTE — ED QUICK NOTES
Persistent low back pain for days with no relief from home meds & narcotics. Concerned that pain not being manage till MRTI will be done the end of the week. Unable to sleep & completely perform ADLs due to limts of movement & pain   Yes

## 2024-12-03 PROBLEM — K57.32 DIVERTICULITIS, COLON: Status: ACTIVE | Noted: 2024-12-03

## 2024-12-10 ENCOUNTER — OFFICE VISIT (OUTPATIENT)
Dept: FAMILY MEDICINE CLINIC | Facility: CLINIC | Age: 54
End: 2024-12-10
Payer: COMMERCIAL

## 2024-12-10 ENCOUNTER — LAB ENCOUNTER (OUTPATIENT)
Dept: LAB | Age: 54
End: 2024-12-10
Attending: FAMILY MEDICINE
Payer: COMMERCIAL

## 2024-12-10 VITALS
DIASTOLIC BLOOD PRESSURE: 74 MMHG | HEART RATE: 82 BPM | BODY MASS INDEX: 43.95 KG/M2 | WEIGHT: 280 LBS | RESPIRATION RATE: 16 BRPM | SYSTOLIC BLOOD PRESSURE: 110 MMHG | OXYGEN SATURATION: 98 % | HEIGHT: 67 IN

## 2024-12-10 DIAGNOSIS — Z85.820 HISTORY OF MELANOMA: ICD-10-CM

## 2024-12-10 DIAGNOSIS — Z77.098 EXPOSURE TO POTENTIALLY HAZARDOUS CHEMICAL: ICD-10-CM

## 2024-12-10 DIAGNOSIS — Z00.00 ANNUAL PHYSICAL EXAM: Primary | ICD-10-CM

## 2024-12-10 DIAGNOSIS — Z86.0101 HISTORY OF ADENOMATOUS POLYP OF COLON: ICD-10-CM

## 2024-12-10 DIAGNOSIS — Z98.84 LAP-BAND SURGERY STATUS: ICD-10-CM

## 2024-12-10 DIAGNOSIS — Z87.891 FORMER TOBACCO USE: ICD-10-CM

## 2024-12-10 DIAGNOSIS — Z85.6 H/O: CML (CHRONIC MYELOID LEUKEMIA): ICD-10-CM

## 2024-12-10 DIAGNOSIS — G47.33 OSA (OBSTRUCTIVE SLEEP APNEA): ICD-10-CM

## 2024-12-10 DIAGNOSIS — Z00.00 ANNUAL PHYSICAL EXAM: ICD-10-CM

## 2024-12-10 DIAGNOSIS — F98.8 ATTENTION DEFICIT DISORDER (ADD) IN ADULT: ICD-10-CM

## 2024-12-10 LAB
CHOLEST SERPL-MCNC: 127 MG/DL (ref ?–200)
COMPLEXED PSA SERPL-MCNC: 0.56 NG/ML (ref ?–4)
EST. AVERAGE GLUCOSE BLD GHB EST-MCNC: 111 MG/DL (ref 68–126)
FASTING PATIENT LIPID ANSWER: YES
HBA1C MFR BLD: 5.5 % (ref ?–5.7)
HDLC SERPL-MCNC: 48 MG/DL (ref 40–59)
LDLC SERPL CALC-MCNC: 57 MG/DL (ref ?–100)
NONHDLC SERPL-MCNC: 79 MG/DL (ref ?–130)
T4 FREE SERPL-MCNC: 1.2 NG/DL (ref 0.8–1.7)
TRIGL SERPL-MCNC: 124 MG/DL (ref 30–149)
TSI SER-ACNC: 0.65 UIU/ML (ref 0.55–4.78)
VIT B12 SERPL-MCNC: 505 PG/ML (ref 211–911)
VIT D+METAB SERPL-MCNC: 58.8 NG/ML (ref 30–100)
VLDLC SERPL CALC-MCNC: 18 MG/DL (ref 0–30)

## 2024-12-10 PROCEDURE — 36415 COLL VENOUS BLD VENIPUNCTURE: CPT

## 2024-12-10 PROCEDURE — 80061 LIPID PANEL: CPT

## 2024-12-10 PROCEDURE — 83036 HEMOGLOBIN GLYCOSYLATED A1C: CPT

## 2024-12-10 PROCEDURE — 99214 OFFICE O/P EST MOD 30 MIN: CPT | Performed by: FAMILY MEDICINE

## 2024-12-10 PROCEDURE — 84439 ASSAY OF FREE THYROXINE: CPT

## 2024-12-10 PROCEDURE — 82607 VITAMIN B-12: CPT

## 2024-12-10 PROCEDURE — 99396 PREV VISIT EST AGE 40-64: CPT | Performed by: FAMILY MEDICINE

## 2024-12-10 PROCEDURE — 84443 ASSAY THYROID STIM HORMONE: CPT

## 2024-12-10 PROCEDURE — 82306 VITAMIN D 25 HYDROXY: CPT

## 2024-12-10 NOTE — PROGRESS NOTES
Valerio Felipe is a 53 year old male who presents for a complete physical exam.   HPI:   Pt complains of low energy    + vit D C   No urinary symptoms  No erectile dysfunction  No penile discharge  No family h/o prostate  Colon cancer - MGM   CHRIS - not using machine yet   c-scope - UTD h/o polyps Dr. Maier     In remission for CML / sees oncology q 3 mo   Sees derm every 6 mo duly   ENT - duly for hearing loss       H/o lap band had lost weight and then gained weight ; now seeing bariatric surgeon      Wt Readings from Last 6 Encounters:   12/10/24 280 lb (127 kg)   12/02/24 265 lb (120.2 kg)   09/16/24 279 lb 12.8 oz (126.9 kg)   09/13/24 260 lb (117.9 kg)   09/03/24 270 lb (122.5 kg)   08/06/24 279 lb (126.6 kg)     Body mass index is 43.85 kg/m².     Cholesterol, Total (mg/dL)   Date Value   12/06/2023 142   07/24/2021 132   11/10/2020 144   01/17/2008 180     CHOLESTEROL, TOTAL (mg/dL)   Date Value   12/01/2022 148     CHOLESTEROL (mg/dL)   Date Value   08/01/2014 145   08/30/2013 160   03/07/2011 161     Cholesterol (mg/dL)   Date Value   11/09/2019 145.00   10/22/2018 148     HDL Cholesterol (mg/dL)   Date Value   12/06/2023 50   07/24/2021 46   11/10/2020 45   01/17/2008 39 (L)     HDL CHOLESTEROL (mg/dL)   Date Value   12/01/2022 59     HDL CHOL (mg/dL)   Date Value   08/01/2014 67   08/30/2013 42 (L)   03/07/2011 38 (L)     Direct HDL (mg/dL)   Date Value   11/09/2019 46 (L)   10/22/2018 87     LDL Cholesterol Calc (mg/dL)   Date Value   01/17/2008 103 (H)     LDL Cholesterol (mg/dL)   Date Value   12/06/2023 75   07/24/2021 60   11/10/2020 74     LDL-CHOLESTEROL (mg/dL (calc))   Date Value   12/01/2022 69     LDL CHOLESTROL (mg/dL)   Date Value   08/01/2014 53   08/30/2013 82   03/07/2011 93     Calculated LDL (mg/dL)   Date Value   11/09/2019 72   10/22/2018 43     Triglycerides (mg/dL)   Date Value   01/17/2008 190 (H)     AST (SGOT) (IU/L)   Date Value   01/17/2008 18   12/11/2006 20     AST (U/L)    Date Value   05/23/2024 58 (H)   05/18/2024 7 (L)   05/17/2024 13 (L)   12/01/2022 15   11/09/2019 15   10/22/2018 22   08/01/2014 14 (L)   12/30/2013 10 (L)   08/30/2013 17     ALT (SGPT) (IU/L)   Date Value   01/17/2008 40   12/11/2006 25     ALT (U/L)   Date Value   05/23/2024 86 (H)   05/18/2024 19   05/17/2024 25   12/01/2022 23   11/09/2019 21   10/22/2018 39   08/01/2014 46   12/30/2013 31   08/30/2013 38      Current Outpatient Medications   Medication Sig Dispense Refill    [START ON 12/16/2024] amphetamine-dextroamphetamine (ADDERALL) 20 MG Oral Tab Take 1 tablet (20 mg total) by mouth 2 (two) times daily. 60 tablet 0    LABETALOL 200 MG Oral Tab TAKE 1 TABLET(200 MG) BY MOUTH TWICE DAILY 180 tablet 0    ATORVASTATIN 10 MG Oral Tab TAKE 1 TABLET(10 MG) BY MOUTH DAILY 90 tablet 0    LOSARTAN 100 MG Oral Tab TAKE 1 TABLET(100 MG) BY MOUTH DAILY 90 tablet 0    SPIRONOLACTONE 50 MG Oral Tab TAKE 1 TABLET(50 MG) BY MOUTH DAILY 90 tablet 0    Dasatinib (SPRYCEL) 100 MG Oral Tab Take 1 tablet by mouth daily.      Vitamin B-12 1000 MCG Oral Tab Take 1 tablet (1,000 mcg total) by mouth daily.      Cholecalciferol (VITAMIN D3) 1.25 MG (42252 UT) Oral Cap Take 1 tablet by mouth once a week.      Ascorbic Acid (VITAMIN C) 1000 MG Oral Tab Take 1 tablet (1,000 mg total) by mouth daily.        Past Medical History:    Back problem    Decorative tattoo    Essential hypertension    Hearing loss    Hemorrhoids    High blood pressure    High cholesterol    Leukemia (HCC)    Leukemia (HCC)    Personal history of antineoplastic chemotherapy    oral chemo (Sprycel)    Smoking      Past Surgical History:   Procedure Laterality Date    Colonoscopy  07/2016    diverticulosis, polyps (adenoma/hp). repeat 5 years    Colonoscopy N/A 05/17/2021    Procedure: COLONOSCOPY;  Surgeon: Fortino Maier MD;  Location:  ENDOSCOPY    Colonoscopy,biopsy N/A 07/11/2016    Procedure: COLONOSCOPY, POSSIBLE BIOPSY, POSSIBLE POLYPECTOMY  36833;  Surgeon: Fortino Maier MD;  Location: [facility]    Colonoscopy,remv lesn,snare N/A 2016    Procedure: COLONOSCOPY, POSSIBLE BIOPSY, POSSIBLE POLYPECTOMY 36456;  Surgeon: Fortino Maier MD;  Location: [facility]    Create eardrum opening,gen anesth  10/07/2021    Knee arthroscopy      Lap gastric bypass/hernando-en-y      Other surgical history      Rotator Cuff Repair left side    Other surgical history Right     meniscus    Other surgical history Right 2024     Microdisectomy      Family History   Problem Relation Age of Onset    Other (COPD [Other]) Father     Heart Attack Father     Stroke Father     Breast Cancer Mother     Diabetes Mother       Social History:  Social History     Socioeconomic History    Marital status:    Tobacco Use    Smoking status: Former     Current packs/day: 0.00     Average packs/day: 0.5 packs/day for 20.0 years (10.0 ttl pk-yrs)     Types: Cigarettes     Quit date: 8/10/2018     Years since quittin.3    Smokeless tobacco: Never    Tobacco comments:     Updated 24   Vaping Use    Vaping status: Never Used   Substance and Sexual Activity    Alcohol use: Yes     Alcohol/week: 1.0 - 2.0 standard drink of alcohol     Types: 1 - 2 Standard drinks or equivalent per week     Comment: once per month    Drug use: Yes     Frequency: 2.0 times per week     Types: Cannabis     Comment: medical marijuana card   Other Topics Concern    Caffeine Concern Yes     Comment: 1 cup a day     Exercise Yes     Comment: 7 x week      Social Drivers of Health     Financial Resource Strain: Low Risk  (2024)    Financial Resource Strain     Difficulty of Paying Living Expenses: Not hard at all     Med Affordability: No   Food Insecurity: No Food Insecurity (2024)    Food Insecurity     Food Insecurity: Never true   Transportation Needs: No Transportation Needs (2024)    Transportation Needs     Lack of Transportation: No   Physical  Activity: Inactive (2/18/2019)    Received from [facility], [facility]    Exercise Vital Sign     Days of Exercise per Week: 0 days     Minutes of Exercise per Session: 0 min    Received from [facility], [facility]    Social Connections   Housing Stability: Low Risk  (6/5/2024)    Housing Stability     Housing Instability: No      Occ: . : . Children: 3  Working full time   Exercise: yes  Diet: ok      REVIEW OF SYSTEMS:   GENERAL: feels well otherwise  SKIN: denies any unusual skin lesions  EYES:denies blurred vision or double vision  HEENT: denies nasal congestion, sinus pain or ST  LUNGS: denies shortness of breath with exertion  CARDIOVASCULAR: denies chest pain on exertion  GI: denies abdominal pain,denies heartburn  : denies nocturia or changes in stream  MUSCULOSKELETAL: denies back pain  NEURO: denies headaches  PSYCHE: denies depression or anxiety  HEMATOLOGIC: denies hx of anemia  ENDOCRINE: denies thyroid history  ALL/ASTHMA: denies hx of allergy or asthma    EXAM:   /74   Pulse 82   Resp 16   Ht 5' 7\" (1.702 m)   Wt 280 lb (127 kg)   SpO2 98%   BMI 43.85 kg/m²   Body mass index is 43.85 kg/m².   GENERAL: well developed, well nourished,in no apparent distress  SKIN: no rashes,no suspicious lesions  HEENT: atraumatic, normocephalic,ears and throat are clear  EYES:PERRLA, EOMI, normal optic disk,conjunctiva are clear  NECK: supple,no adenopathy,no bruits  CHEST: no chest tenderness  BREAST: no dominant or suspicious mass  LUNGS: clear to auscultation  CARDIO: RRR without murmur  GI: good BS's,no masses, HSM or tenderness  ((: two descended testes,no masses,no hernia,no penile lesions  RECTAL: good rectal tone, prostate shows no masses, stool is OB negative)) deferred    MUSCULOSKELETAL: back is not tender,FROM of the back  EXTREMITIES: no cyanosis, clubbing or edema  NEURO: Oriented times three,cranial  nerves are intact,motor and sensory are grossly intact    ASSESSMENT AND PLAN:   Valerio Felipe is a 53 year old male who presents with     1. Annual physical exam    - Vitamin D [E]; Future  - PSA, Total (Screening) [E]; Future  - Free T4, (Free Thyroxine); Future  - Assay, Thyroid Stim Hormone; Future  - Lipid Panel; Future  - Vitamin B12; Future  - Hemoglobin A1C; Future    2. CHRIS (obstructive sleep apnea)  Discussed     3. History of melanoma  See derm     4. H/O: CML (chronic myeloid leukemia)  See oncology     5. LAP-BAND surgery status  See bariatric surgeon     6. History of adenomatous polyp of colon  Screening UTD     7. Former tobacco use    - CT LUNG LD SCREENING(CPT=71271); Future    8. Exposure to potentially hazardous chemical    - CT LUNG LD SCREENING(CPT=71271); Future    Discussed vaccines     Discussed diet, exercise, calcium, vit D and fish oil.   The patient indicates understanding of these issues and agrees to the plan.  The patient is asked to return for 6 mo or sooner if needed.        Valerio Felipe is a 53 year old male here to discuss ADD    HPI:     S/p ear tubes  S/p spinal surgery   S/p surgery on both knees   S/p EGD and c-scope soon     Pt has cpap but not always wearing it     Patient denies chest pain, shortness of breath, dizziness, and lightheadedness. No exertional symptoms.  No palpitations     On adderall for control.  well controlled  no SI/HI  No insomnia/poor sleep  Normal appetite  No anhedonia  No hopelessness  No anxiety or depression   Denies side effects  Weight has been stable    Current Outpatient Medications   Medication Sig Dispense Refill    [START ON 12/16/2024] amphetamine-dextroamphetamine (ADDERALL) 20 MG Oral Tab Take 1 tablet (20 mg total) by mouth 2 (two) times daily. 60 tablet 0    LABETALOL 200 MG Oral Tab TAKE 1 TABLET(200 MG) BY MOUTH TWICE DAILY 180 tablet 0    ATORVASTATIN 10 MG Oral Tab TAKE 1 TABLET(10 MG) BY MOUTH DAILY 90 tablet 0    LOSARTAN 100  MG Oral Tab TAKE 1 TABLET(100 MG) BY MOUTH DAILY 90 tablet 0    SPIRONOLACTONE 50 MG Oral Tab TAKE 1 TABLET(50 MG) BY MOUTH DAILY 90 tablet 0    Dasatinib (SPRYCEL) 100 MG Oral Tab Take 1 tablet by mouth daily.      Vitamin B-12 1000 MCG Oral Tab Take 1 tablet (1,000 mcg total) by mouth daily.      Cholecalciferol (VITAMIN D3) 1.25 MG (32196 UT) Oral Cap Take 1 tablet by mouth once a week.      Ascorbic Acid (VITAMIN C) 1000 MG Oral Tab Take 1 tablet (1,000 mg total) by mouth daily.        Past Medical History:    Back problem    Decorative tattoo    Essential hypertension    Hearing loss    Hemorrhoids    High blood pressure    High cholesterol    Leukemia (HCC)    Leukemia (HCC)    Personal history of antineoplastic chemotherapy    oral chemo (Sprycel)    Smoking      Social History:  Social History     Socioeconomic History    Marital status:    Tobacco Use    Smoking status: Former     Current packs/day: 0.00     Average packs/day: 0.5 packs/day for 20.0 years (10.0 ttl pk-yrs)     Types: Cigarettes     Quit date: 8/10/2018     Years since quittin.3    Smokeless tobacco: Never    Tobacco comments:     Updated 24   Vaping Use    Vaping status: Never Used   Substance and Sexual Activity    Alcohol use: Yes     Alcohol/week: 1.0 - 2.0 standard drink of alcohol     Types: 1 - 2 Standard drinks or equivalent per week     Comment: once per month    Drug use: Yes     Frequency: 2.0 times per week     Types: Cannabis     Comment: medical marijuana card   Other Topics Concern    Caffeine Concern Yes     Comment: 1 cup a day     Exercise Yes     Comment: 7 x week      Social Drivers of Health     Financial Resource Strain: Low Risk  (2024)    Financial Resource Strain     Difficulty of Paying Living Expenses: Not hard at all     Med Affordability: No   Food Insecurity: No Food Insecurity (2024)    Food Insecurity     Food Insecurity: Never true   Transportation Needs: No Transportation Needs  (6/5/2024)    Transportation Needs     Lack of Transportation: No   Physical Activity: Inactive (2/18/2019)    Received from [facility], [facility]    Exercise Vital Sign     Days of Exercise per Week: 0 days     Minutes of Exercise per Session: 0 min    Received from [facility], [facility]    Social Connections   Housing Stability: Low Risk  (6/5/2024)    Housing Stability     Housing Instability: No        REVIEW OF SYSTEMS:   GENERAL HEALTH: feels well otherwise  SKIN: denies any unusual skin lesions or rashes  RESPIRATORY: denies shortness of breath with exertion  CARDIOVASCULAR: denies chest pain on exertion  GI: denies abdominal pain and denies heartburn  NEURO: denies headaches    EXAM:   /74   Pulse 82   Resp 16   Ht 5' 7\" (1.702 m)   Wt 280 lb (127 kg)   SpO2 98%   BMI 43.85 kg/m²   GENERAL: well developed, well nourished,in no apparent distress  PSYCH: normal affect, good eye contact, appropriate  NEURO: CN intact  HEART: normal   LUNGS: clear  LE: no edema     ASSESSMENT AND PLAN:       9. Attention deficit disorder (ADD) in adult  cpm      The patient indicates understanding of these issues and agrees to the plan.  The patient is asked to return in 6 mo or sooner if needed

## 2025-01-02 RX ORDER — LOSARTAN POTASSIUM 100 MG/1
100 TABLET ORAL DAILY
Qty: 90 TABLET | Refills: 0 | Status: SHIPPED | OUTPATIENT
Start: 2025-01-02

## 2025-01-09 DIAGNOSIS — F98.8 ATTENTION DEFICIT DISORDER (ADD) IN ADULT: ICD-10-CM

## 2025-01-09 RX ORDER — DEXTROAMPHETAMINE SACCHARATE, AMPHETAMINE ASPARTATE, DEXTROAMPHETAMINE SULFATE AND AMPHETAMINE SULFATE 5; 5; 5; 5 MG/1; MG/1; MG/1; MG/1
20 TABLET ORAL 2 TIMES DAILY
Qty: 60 TABLET | Refills: 0 | Status: SHIPPED | OUTPATIENT
Start: 2025-01-09 | End: 2025-02-08

## 2025-01-09 NOTE — TELEPHONE ENCOUNTER
.A refill request was received for:  Requested Prescriptions     Pending Prescriptions Disp Refills    amphetamine-dextroamphetamine (ADDERALL) 20 MG Oral Tab 60 tablet 0     Sig: Take 1 tablet (20 mg total) by mouth 2 (two) times daily.       Last refill date:   12/16/24    Last office visit: 12/10/24    Follow up due:  Future Appointments   Date Time Provider Department Center   6/10/2025 10:00 AM Ayse Luo DO [facility] [address]

## 2025-01-22 RX ORDER — LABETALOL 200 MG/1
200 TABLET, FILM COATED ORAL 2 TIMES DAILY
Qty: 180 TABLET | Refills: 0 | Status: SHIPPED | OUTPATIENT
Start: 2025-01-22

## 2025-01-22 NOTE — TELEPHONE ENCOUNTER
A refill request was received for:  Requested Prescriptions     Pending Prescriptions Disp Refills    labetalol 200 MG Oral Tab 180 tablet 0     Sig: Take 1 tablet (200 mg total) by mouth 2 (two) times daily.       Last refill date:   10/15/2024    Last office visit: 12/10/2024    Follow up due: Scheduled for 610/2025  Future Appointments   Date Time Provider Department Center   6/10/2025 10:00 AM Ayse Luo DO [facility] [address]

## 2025-02-13 DIAGNOSIS — F98.8 ATTENTION DEFICIT DISORDER (ADD) IN ADULT: ICD-10-CM

## 2025-02-13 RX ORDER — DEXTROAMPHETAMINE SACCHARATE, AMPHETAMINE ASPARTATE, DEXTROAMPHETAMINE SULFATE AND AMPHETAMINE SULFATE 5; 5; 5; 5 MG/1; MG/1; MG/1; MG/1
20 TABLET ORAL 2 TIMES DAILY
Qty: 60 TABLET | Refills: 0 | Status: SHIPPED | OUTPATIENT
Start: 2025-02-13 | End: 2025-03-15

## 2025-02-13 NOTE — TELEPHONE ENCOUNTER
.A refill request was received for:  Requested Prescriptions     Pending Prescriptions Disp Refills    amphetamine-dextroamphetamine (ADDERALL) 20 MG Oral Tab 60 tablet 0     Sig: Take 1 tablet (20 mg total) by mouth 2 (two) times daily.       Last refill date:   1/9/25    Last office visit: 12/10/24    Follow up due:  Future Appointments   Date Time Provider Department Center   2/17/2025  8:30 AM PF CT 1 PF [facility]   6/10/2025 10:00 AM Ayse Luo DO [facility] [address]

## 2025-02-17 ENCOUNTER — HOSPITAL ENCOUNTER (OUTPATIENT)
Dept: CT IMAGING | Age: 55
Discharge: HOME | End: 2025-02-17
Attending: FAMILY MEDICINE
Payer: COMMERCIAL

## 2025-02-17 ENCOUNTER — ORDER TRANSCRIPTION (OUTPATIENT)
Dept: ADMINISTRATIVE | Facility: HOSPITAL | Age: 55
End: 2025-02-17

## 2025-02-17 DIAGNOSIS — Z13.6 SCREENING FOR CARDIOVASCULAR CONDITION: Primary | ICD-10-CM

## 2025-02-17 DIAGNOSIS — Z77.098 EXPOSURE TO POTENTIALLY HAZARDOUS CHEMICAL: ICD-10-CM

## 2025-02-17 DIAGNOSIS — Z87.891 FORMER TOBACCO USE: ICD-10-CM

## 2025-02-17 PROCEDURE — 71271 CT THORAX LUNG CANCER SCR C-: CPT | Performed by: FAMILY MEDICINE

## 2025-03-05 ENCOUNTER — HOSPITAL ENCOUNTER (OUTPATIENT)
Dept: CT IMAGING | Age: 55
Discharge: HOME | End: 2025-03-05
Attending: FAMILY MEDICINE

## 2025-03-05 VITALS
SYSTOLIC BLOOD PRESSURE: 120 MMHG | WEIGHT: 280 LBS | BODY MASS INDEX: 43.95 KG/M2 | DIASTOLIC BLOOD PRESSURE: 78 MMHG | HEIGHT: 67 IN

## 2025-03-05 DIAGNOSIS — Z13.6 SCREENING FOR CARDIOVASCULAR CONDITION: ICD-10-CM

## 2025-03-05 LAB
GLUCOSE POC: 89 MG/DL (ref 70–100)
HDL POC: 35 MG/DL (ref 40–60)
LDL POC: 110 MG/DL (ref 0–130)
TC/HDL RATIO: 5 (ref 0–5)
TOTAL CHOLESTEROL POC: 166 MG/DL (ref 0–200)
TRIGLYCERIDES POC: 106 MG/DL (ref 0–200)

## 2025-03-05 NOTE — PROGRESS NOTES
Date of Service 3/5/2025    JACKIE MON  Date of Birth 12/18/1970    Patient Age: 54 year old    PCP: Ayse Luo, DO  [address] [place] [id number]    Heart Scan Consult  Preliminary Heart Scan Score: 207    Previous Screening  Heart Scan Completed Previously: No        Peripheral Vascular Scan Completed Previously: No          Risk Factors  Personal Risk Factors  Non-alterable Risk Factors: Family History, Age, Gender  Alterable Risk Factors: High Blood Pressure, Abnormal Cholesterol, Lack of exercise      Body Mass Index  Body mass index is 43.85 kg/m².    Blood Pressure     /78     (Normal =< 120/80,  Elevated = 120-129/ >80,  High Stage1 130-139/80-89 , Stage2 >140/>90)    Lipid Profile  Patient was in fasting state: Yes    Cholesterol: 166, done on 3/5/2025.  HDL Cholesterol: 35, done on 3/5/2025.  LDL Cholesterol: 110, done on 3/5/2025.  TriGlycerides 106, done on 3/5/2025.    Cholesterol Goals  Value   Total  =< 200   HDL  = > 45 Men = > 55 Women   LDL   =< 100   Triglycerides  =< 150       Glucose and Hemoglobin A1C  Lab Results   Component Value Date    PGLU 93 05/18/2024    GLU 89 03/05/2025    A1C 5.5 12/10/2024     (Normal Fasting Glucose < 100mg/dl )    Nurse Review  Risk factor information and results reviewed with Nurse: Yes    Recommended Follow Up:  Consult your physician regarding:: Final Heart Scan Report, Discuss potential for Incidental Finding, Discuss Potential for Score Variance      Recommendations for Change:  Nutrition Changes: Low Saturated Fat, Low Fat Dairy, Low Salt Eating, Increase Fiber    Cholesterol Modification (goal of therapy depends upon your risk): Increase HDL (Healthy/Good) Normal >45 Men >55 Women, Decrease LDL (Lousy/Bad) Ideal <100    Exercise: Initiate Program         Weight Management: Decrease Current Weight         Repeat Heart Scan: Discuss with your Physician, 3 Years if Calcium Score is > 0.0              [place] Recommended  Resources:  Recommended Resources: Upcoming Classes, Medical Services and Health Library [email or web address]            Romy ENGEL RN        Please Contact the Nurse Heart Line with any Questions or Concerns [phone].

## 2025-03-07 PROBLEM — R93.1 ELEVATED CORONARY ARTERY CALCIUM SCORE: Status: ACTIVE | Noted: 2025-03-07

## 2025-03-20 DIAGNOSIS — F98.8 ATTENTION DEFICIT DISORDER (ADD) IN ADULT: ICD-10-CM

## 2025-03-20 RX ORDER — DEXTROAMPHETAMINE SACCHARATE, AMPHETAMINE ASPARTATE, DEXTROAMPHETAMINE SULFATE AND AMPHETAMINE SULFATE 5; 5; 5; 5 MG/1; MG/1; MG/1; MG/1
20 TABLET ORAL 2 TIMES DAILY
Qty: 60 TABLET | Refills: 0 | Status: SHIPPED | OUTPATIENT
Start: 2025-03-20 | End: 2025-04-19

## 2025-03-20 NOTE — TELEPHONE ENCOUNTER
.A refill request was received for:  Requested Prescriptions     Pending Prescriptions Disp Refills    amphetamine-dextroamphetamine (ADDERALL) 20 MG Oral Tab 60 tablet 0     Sig: Take 1 tablet (20 mg total) by mouth 2 (two) times daily.       Last refill date:   2/13/25    Last office visit: 12/10/24    Follow up due:  Future Appointments   Date Time Provider Department Center   6/10/2025 10:00 AM Ayse Luo DO [facility]

## 2025-04-01 RX ORDER — SPIRONOLACTONE 50 MG/1
50 TABLET, FILM COATED ORAL DAILY
Qty: 90 TABLET | Refills: 0 | Status: SHIPPED | OUTPATIENT
Start: 2025-04-01

## 2025-04-01 RX ORDER — LOSARTAN POTASSIUM 100 MG/1
100 TABLET ORAL DAILY
Qty: 90 TABLET | Refills: 0 | Status: SHIPPED | OUTPATIENT
Start: 2025-04-01

## 2025-04-17 DIAGNOSIS — F98.8 ATTENTION DEFICIT DISORDER (ADD) IN ADULT: ICD-10-CM

## 2025-04-18 RX ORDER — DEXTROAMPHETAMINE SACCHARATE, AMPHETAMINE ASPARTATE, DEXTROAMPHETAMINE SULFATE AND AMPHETAMINE SULFATE 5; 5; 5; 5 MG/1; MG/1; MG/1; MG/1
20 TABLET ORAL 2 TIMES DAILY
Qty: 60 TABLET | Refills: 0 | Status: SHIPPED | OUTPATIENT
Start: 2025-04-18 | End: 2025-05-18

## 2025-04-18 NOTE — TELEPHONE ENCOUNTER
.A refill request was received for:  Requested Prescriptions     Pending Prescriptions Disp Refills    amphetamine-dextroamphetamine (ADDERALL) 20 MG Oral Tab 60 tablet 0     Sig: Take 1 tablet (20 mg total) by mouth 2 (two) times daily.       Last refill date:   3/30/25    Last office visit: 12/10/24    Follow up due:  Future Appointments   Date Time Provider Department Center   6/10/2025 10:00 AM Ayse Luo DO [facility] [address] [facility]

## 2025-05-19 DIAGNOSIS — F98.8 ATTENTION DEFICIT DISORDER (ADD) IN ADULT: ICD-10-CM

## 2025-05-19 RX ORDER — DEXTROAMPHETAMINE SACCHARATE, AMPHETAMINE ASPARTATE, DEXTROAMPHETAMINE SULFATE AND AMPHETAMINE SULFATE 5; 5; 5; 5 MG/1; MG/1; MG/1; MG/1
20 TABLET ORAL 2 TIMES DAILY
Qty: 60 TABLET | Refills: 0 | Status: SHIPPED | OUTPATIENT
Start: 2025-05-19 | End: 2025-06-18

## 2025-05-19 NOTE — TELEPHONE ENCOUNTER
A refill request was received for:  Requested Prescriptions     Pending Prescriptions Disp Refills    amphetamine-dextroamphetamine (ADDERALL) 20 MG Oral Tab 60 tablet 0     Sig: Take 1 tablet (20 mg total) by mouth 2 (two) times daily.       Last refill date:   4/18/25    Last office visit: 12/10/2024    Follow up due:  Future Appointments   Date Time Provider Department Center   6/10/2025 10:00 AM Ayse Luo DO [facility]

## 2025-06-10 NOTE — PROGRESS NOTES
Valerio Felipe is a 54 year old male who presents for a complete physical exam.   HPI:   Pt complains of low energy    + vit D C   No urinary symptoms  No erectile dysfunction  No penile discharge  No family h/o prostate  Colon cancer - MGM   CHRIS - not using machine yet   c-scope - UTD h/o polyps Dr. Maier     In remission for CML / sees oncology q 3 mo   Sees derm every 6 mo duly   ENT - duly for hearing loss       H/o lap band had lost weight and then gained weight ; now seeing bariatric surgeon      Wt Readings from Last 6 Encounters:   06/10/25 269 lb (122 kg)   03/05/25 280 lb (127 kg)   12/10/24 280 lb (127 kg)   12/02/24 265 lb (120.2 kg)   09/16/24 279 lb 12.8 oz (126.9 kg)   09/13/24 260 lb (117.9 kg)     Body mass index is 42.13 kg/m².     Cholesterol, Total (mg/dL)   Date Value   12/10/2024 127   12/06/2023 142   07/24/2021 132   01/17/2008 180     CHOLESTEROL, TOTAL (mg/dL)   Date Value   12/01/2022 148     CHOLESTEROL (mg/dL)   Date Value   08/01/2014 145   08/30/2013 160   03/07/2011 161     Cholesterol (mg/dL)   Date Value   11/09/2019 145.00   10/22/2018 148     Total Cholesterol (POC) (mg/dl)   Date Value   03/05/2025 166     HDL Cholesterol (mg/dL)   Date Value   12/10/2024 48   12/06/2023 50   07/24/2021 46   01/17/2008 39 (L)     HDL CHOLESTEROL (mg/dL)   Date Value   12/01/2022 59     HDL CHOL (mg/dL)   Date Value   08/01/2014 67   08/30/2013 42 (L)   03/07/2011 38 (L)     Direct HDL (mg/dL)   Date Value   11/09/2019 46 (L)   10/22/2018 87     HDL (POC) (mg/dl)   Date Value   03/05/2025 35 (A)     LDL Cholesterol Calc (mg/dL)   Date Value   01/17/2008 103 (H)     LDL Cholesterol (mg/dL)   Date Value   12/10/2024 57   12/06/2023 75   07/24/2021 60     LDL-CHOLESTEROL (mg/dL (calc))   Date Value   12/01/2022 69     LDL CHOLESTROL (mg/dL)   Date Value   08/01/2014 53   08/30/2013 82   03/07/2011 93     Calculated LDL (mg/dL)   Date Value   11/09/2019 72   10/22/2018 43     LDL (POC) (mg/dl)    Date Value   03/05/2025 110     Triglycerides (mg/dL)   Date Value   01/17/2008 190 (H)     AST (SGOT) (IU/L)   Date Value   01/17/2008 18   12/11/2006 20     AST (U/L)   Date Value   05/23/2024 58 (H)   05/18/2024 7 (L)   05/17/2024 13 (L)   12/01/2022 15   11/09/2019 15   10/22/2018 22   08/01/2014 14 (L)   12/30/2013 10 (L)   08/30/2013 17     ALT (SGPT) (IU/L)   Date Value   01/17/2008 40   12/11/2006 25     ALT (U/L)   Date Value   05/23/2024 86 (H)   05/18/2024 19   05/17/2024 25   12/01/2022 23   11/09/2019 21   10/22/2018 39   08/01/2014 46   12/30/2013 31   08/30/2013 38      Current Outpatient Medications   Medication Sig Dispense Refill    amphetamine-dextroamphetamine (ADDERALL) 20 MG Oral Tab Take 1 tablet (20 mg total) by mouth 2 (two) times daily. 60 tablet 0    spironolactone 50 MG Oral Tab Take 1 tablet (50 mg total) by mouth daily. 90 tablet 0    losartan 100 MG Oral Tab Take 1 tablet (100 mg total) by mouth daily. 90 tablet 0    labetalol 200 MG Oral Tab Take 1 tablet (200 mg total) by mouth 2 (two) times daily. 180 tablet 0    Dasatinib (SPRYCEL) 100 MG Oral Tab Take 1 tablet by mouth daily.      Cholecalciferol (VITAMIN D3) 1.25 MG (32946 UT) Oral Cap Take 1 tablet by mouth once a week.      Ascorbic Acid (VITAMIN C) 1000 MG Oral Tab Take 1 tablet (1,000 mg total) by mouth daily.        Past Medical History:    Back problem    Decorative tattoo    Essential hypertension    Hearing loss    Hemorrhoids    High blood pressure    High cholesterol    Leukemia (HCC)    Leukemia (HCC)    Personal history of antineoplastic chemotherapy    oral chemo (Sprycel)    Smoking      Past Surgical History:   Procedure Laterality Date    Colonoscopy  07/2016    diverticulosis, polyps (adenoma/hp). repeat 5 years    Colonoscopy N/A 05/17/2021    Procedure: COLONOSCOPY;  Surgeon: Fortino Maier MD;  Location:  ENDOSCOPY    Colonoscopy,biopsy N/A 07/11/2016    Procedure: COLONOSCOPY, POSSIBLE BIOPSY,  POSSIBLE POLYPECTOMY 61389;  Surgeon: Fortino Maier MD;  Location: [facility]    Colonoscopy,remv lesn,snare N/A 2016    Procedure: COLONOSCOPY, POSSIBLE BIOPSY, POSSIBLE POLYPECTOMY 02127;  Surgeon: Fortino Maier MD;  Location: [facility]    Create eardrum opening,gen anesth  10/07/2021    Knee arthroscopy      Lap gastric bypass/hernando-en-y      Other surgical history      Rotator Cuff Repair left side    Other surgical history Right     meniscus    Other surgical history Right 2024     Microdisectomy      Family History   Problem Relation Age of Onset    Other (COPD [Other]) Father     Heart Attack Father     Stroke Father     Breast Cancer Mother     Diabetes Mother       Social History:  Social History     Socioeconomic History    Marital status:    Tobacco Use    Smoking status: Former     Current packs/day: 0.00     Average packs/day: 0.5 packs/day for 20.0 years (10.0 ttl pk-yrs)     Types: Cigarettes     Quit date: 8/10/2018     Years since quittin.8    Smokeless tobacco: Never    Tobacco comments:     Updated 24   Vaping Use    Vaping status: Never Used   Substance and Sexual Activity    Alcohol use: Yes     Alcohol/week: 1.0 - 2.0 standard drink of alcohol     Types: 1 - 2 Standard drinks or equivalent per week     Comment: once per month    Drug use: Yes     Frequency: 2.0 times per week     Types: Cannabis     Comment: medical marijuana card   Other Topics Concern    Caffeine Concern Yes     Comment: 1 cup a day     Exercise Yes     Comment: 7 x week      Social Drivers of Health     Food Insecurity: No Food Insecurity (2024)    Food Insecurity     Food Insecurity: Never true   Transportation Needs: No Transportation Needs (2024)    Transportation Needs     Lack of Transportation: No   Housing Stability: Low Risk  (2024)    Housing Stability     Housing Instability: No      Occ: . : . Children: 3  Working full time   Exercise: yes   Diet: ok      REVIEW OF SYSTEMS:   GENERAL: feels well otherwise  SKIN: denies any unusual skin lesions  EYES:denies blurred vision or double vision  HEENT: denies nasal congestion, sinus pain or ST  LUNGS: denies shortness of breath with exertion  CARDIOVASCULAR: denies chest pain on exertion  GI: denies abdominal pain,denies heartburn  : denies nocturia or changes in stream  MUSCULOSKELETAL: denies back pain  NEURO: denies headaches  PSYCHE: denies depression or anxiety  HEMATOLOGIC: denies hx of anemia  ENDOCRINE: denies thyroid history  ALL/ASTHMA: denies hx of allergy or asthma    EXAM:   /68   Pulse 74   Resp 16   Ht 5' 7\" (1.702 m)   Wt 269 lb (122 kg)   SpO2 98%   BMI 42.13 kg/m²   Body mass index is 42.13 kg/m².   GENERAL: well developed, well nourished,in no apparent distress  SKIN: no rashes,no suspicious lesions  HEENT: atraumatic, normocephalic,ears and throat are clear  EYES:PERRLA, EOMI, normal optic disk,conjunctiva are clear  NECK: supple,no adenopathy,no bruits  CHEST: no chest tenderness  BREAST: no dominant or suspicious mass  LUNGS: clear to auscultation  CARDIO: RRR without murmur  GI: good BS's,no masses, HSM or tenderness  ((: two descended testes,no masses,no hernia,no penile lesions  RECTAL: good rectal tone, prostate shows no masses, stool is OB negative)) deferred    MUSCULOSKELETAL: back is not tender,FROM of the back  EXTREMITIES: no cyanosis, clubbing or edema  NEURO: Oriented times three,cranial nerves are intact,motor and sensory are grossly intact    ASSESSMENT AND PLAN:   Valerio Felipe is a 54 year old male who presents with     1. Annual physical exam    - Vitamin D [E]; Future  - PSA, Total (Screening) [E]; Future  - Free T4, (Free Thyroxine); Future  - Assay, Thyroid Stim Hormone; Future  - Lipid Panel; Future  - Vitamin B12; Future  - Hemoglobin A1C; Future    2. CHRIS (obstructive sleep apnea)  Discussed     3. History of melanoma  See derm     4. H/O: CML  (chronic myeloid leukemia)  See oncology     5. LAP-BAND surgery status  See bariatric surgeon     6. History of adenomatous polyp of colon  Screening UTD     7. Former tobacco use    - CT LUNG LD SCREENING(CPT=71271); Future    8. Exposure to potentially hazardous chemical    - CT LUNG LD SCREENING(CPT=71271); Future    Discussed vaccines     Discussed diet, exercise, calcium, vit D and fish oil.   The patient indicates understanding of these issues and agrees to the plan.  The patient is asked to return for 6 mo or sooner if needed.        Valerio Felipe is a 54 year old male here to discuss ADD    HPI:     S/p ear tubes  S/p spinal surgery   S/p surgery on both knees   S/p EGD and c-scope soon     Pt has cpap but not always wearing it     Patient denies chest pain, shortness of breath, dizziness, and lightheadedness. No exertional symptoms.  No palpitations     On adderall for control.  well controlled  no SI/HI  No insomnia/poor sleep  Normal appetite  No anhedonia  No hopelessness  No anxiety or depression   Denies side effects  Weight has been stable    Current Outpatient Medications   Medication Sig Dispense Refill    amphetamine-dextroamphetamine (ADDERALL) 20 MG Oral Tab Take 1 tablet (20 mg total) by mouth 2 (two) times daily. 60 tablet 0    spironolactone 50 MG Oral Tab Take 1 tablet (50 mg total) by mouth daily. 90 tablet 0    losartan 100 MG Oral Tab Take 1 tablet (100 mg total) by mouth daily. 90 tablet 0    labetalol 200 MG Oral Tab Take 1 tablet (200 mg total) by mouth 2 (two) times daily. 180 tablet 0    Dasatinib (SPRYCEL) 100 MG Oral Tab Take 1 tablet by mouth daily.      Cholecalciferol (VITAMIN D3) 1.25 MG (10614 UT) Oral Cap Take 1 tablet by mouth once a week.      Ascorbic Acid (VITAMIN C) 1000 MG Oral Tab Take 1 tablet (1,000 mg total) by mouth daily.        Past Medical History:    Back problem    Decorative tattoo    Essential hypertension    Hearing loss    Hemorrhoids    High blood  pressure    High cholesterol    Leukemia (HCC)    Leukemia (HCC)    Personal history of antineoplastic chemotherapy    oral chemo (Sprycel)    Smoking      Social History:  Social History     Socioeconomic History    Marital status:    Tobacco Use    Smoking status: Former     Current packs/day: 0.00     Average packs/day: 0.5 packs/day for 20.0 years (10.0 ttl pk-yrs)     Types: Cigarettes     Quit date: 8/10/2018     Years since quittin.8    Smokeless tobacco: Never    Tobacco comments:     Updated 24   Vaping Use    Vaping status: Never Used   Substance and Sexual Activity    Alcohol use: Yes     Alcohol/week: 1.0 - 2.0 standard drink of alcohol     Types: 1 - 2 Standard drinks or equivalent per week     Comment: once per month    Drug use: Yes     Frequency: 2.0 times per week     Types: Cannabis     Comment: medical marijuana card   Other Topics Concern    Caffeine Concern Yes     Comment: 1 cup a day     Exercise Yes     Comment: 7 x week      Social Drivers of Health     Food Insecurity: No Food Insecurity (2024)    Food Insecurity     Food Insecurity: Never true   Transportation Needs: No Transportation Needs (2024)    Transportation Needs     Lack of Transportation: No   Housing Stability: Low Risk  (2024)    Housing Stability     Housing Instability: No        REVIEW OF SYSTEMS:   GENERAL HEALTH: feels well otherwise  SKIN: denies any unusual skin lesions or rashes  RESPIRATORY: denies shortness of breath with exertion  CARDIOVASCULAR: denies chest pain on exertion  GI: denies abdominal pain and denies heartburn  NEURO: denies headaches    EXAM:   /68   Pulse 74   Resp 16   Ht 5' 7\" (1.702 m)   Wt 269 lb (122 kg)   SpO2 98%   BMI 42.13 kg/m²   GENERAL: well developed, well nourished,in no apparent distress  PSYCH: normal affect, good eye contact, appropriate  NEURO: CN intact  HEART: normal   LUNGS: clear  LE: no edema     ASSESSMENT AND PLAN:     1. Lung nodule  See  oncology     2. Former tobacco use  See oncology     3. History of melanoma  See derm     4. CHRIS (obstructive sleep apnea)  cpap    5. H/O: CML (chronic myeloid leukemia)  See oncology     6. Essential hypertension  cpm    7. Attention deficit disorder (ADD) in adult    - amphetamine-dextroamphetamine (ADDERALL) 20 MG Oral Tab; Take 1 tablet (20 mg total) by mouth 2 (two) times daily.  Dispense: 60 tablet; Refill: 0  - amphetamine-dextroamphetamine (ADDERALL) 20 MG Oral Tab; Take 1 tablet (20 mg total) by mouth 2 (two) times daily.  Dispense: 60 tablet; Refill: 0  - amphetamine-dextroamphetamine (ADDERALL) 20 MG Oral Tab; Take 1 tablet (20 mg total) by mouth 2 (two) times daily.  Dispense: 60 tablet; Refill: 0      The patient indicates understanding of these issues and agrees to the plan.  The patient is asked to return in 6 mo or sooner if needed

## 2025-07-02 NOTE — TELEPHONE ENCOUNTER
A refill request was received for:  Requested Prescriptions     Pending Prescriptions Disp Refills    labetalol 200 MG Oral Tab 180 tablet 0     Sig: Take 1 tablet (200 mg total) by mouth 2 (two) times daily.    spironolactone 50 MG Oral Tab 90 tablet 0     Sig: Take 1 tablet (50 mg total) by mouth daily.    losartan 100 MG Oral Tab 90 tablet 0     Sig: Take 1 tablet (100 mg total) by mouth daily.       Last refill date:     Labetalol 1/22/25  Losartan 4/1/25  Spironolactone 4/1/25    Last office visit: 6/10/25    Follow up due: Coming for 6 month in December  Future Appointments   Date Time Provider Department Center   12/16/2025  9:00 AM Ayse Luo DO [facility] [address]

## (undated) DIAGNOSIS — Z12.83 SCREENING EXAM FOR SKIN CANCER: Primary | ICD-10-CM

## (undated) DEVICE — 3M™ TEGADERM™ +PAD FILM DRESSING WITH NON-ADHERENT PAD, 3584, 2-3/8 IN X 4 IN (6 CM X 10 CM), 50/CAR, 4 CAR/CS: Brand: 3M™ TEGADERM™

## (undated) DEVICE — ADHESIVE SKIN TOP FOR WND CLSR DERMBND ADV

## (undated) DEVICE — LOW PROFILE (LP) BLADE ASSEMBLY 6PK: Brand: MICROAIRE®

## (undated) DEVICE — SUT SILK 0 FSL 678G

## (undated) DEVICE — 1200CC GUARDIAN II: Brand: GUARDIAN

## (undated) DEVICE — SUT ETHILON 3-0 PS-2 1669H

## (undated) DEVICE — DRAPE,LAPAROTOMY,PCH,STERILE: Brand: MEDLINE

## (undated) DEVICE — PAIN TRAY: Brand: MEDLINE INDUSTRIES, INC.

## (undated) DEVICE — LIGHT HANDLE

## (undated) DEVICE — DERMABOND CLOSURE 0.7ML TOPICL

## (undated) DEVICE — LAMINECTOMY CDS: Brand: MEDLINE INDUSTRIES, INC.

## (undated) DEVICE — STERILE POLYISOPRENE POWDER-FREE SURGICAL GLOVES: Brand: PROTEXIS

## (undated) DEVICE — EVACUATOR URO RELIVAC 100CC

## (undated) DEVICE — E-Z CLEAN, NON-STICK, PTFE COATED, ELECTROSURGICAL BLADE ELECTRODE, MODIFIED EXTENDED INSULATION, 4 INCH (10.2 CM): Brand: MEGADYNE

## (undated) DEVICE — Device

## (undated) DEVICE — GLOVE SUR 7.5 SENSICARE PIP WHT PWD F

## (undated) DEVICE — ZZ- DISC- NOSUB-SOLUTION RUBBING 4OZ 70% ISO ALC CLR

## (undated) DEVICE — 1000 S-DRAPE TOWEL DRAPE 10/BX: Brand: STERI-DRAPE™

## (undated) DEVICE — C-ARM: Brand: UNBRANDED

## (undated) DEVICE — FRAZIER SUCTION INSTRUMENT 12 FR W/CONTROL VENT & OBTURATOR: Brand: FRAZIER

## (undated) DEVICE — MINI-BLADE®: Brand: BEAVER®

## (undated) DEVICE — 3M™ RED DOT™ MONITORING ELECTRODE WITH FOAM TAPE AND STICKY GEL, 50/BAG, 20/CASE, 72/PLT 2570: Brand: RED DOT™

## (undated) DEVICE — SOLUTION ANTIFOG W/ SPONGE

## (undated) DEVICE — GLOVE SUR 8 SENSICARE PI PIP GRN PWD F

## (undated) DEVICE — BANDAGE ADH 1INX3IN NAT FAB N ADH PD CURAD

## (undated) DEVICE — SUT MONOCRYL 4-0 PS-2 Y496G

## (undated) DEVICE — CLOSURE EXOFIN 1.0ML

## (undated) DEVICE — DRESSING BIOPATCH 1X4 BLUE

## (undated) DEVICE — STERILE SYNTHETIC POLYISOPRENE POWDER-FREE SURGICAL GLOVES WITH HYDROGEL COATING, SMOOTH FINISH, STRAIGHT FINGER: Brand: PROTEXIS

## (undated) DEVICE — SYRINGE 10ML LL CONTRL SYRINGE

## (undated) DEVICE — FILTERLINE NASAL ADULT O2/CO2

## (undated) DEVICE — INTENDED TO BE USED TO OCCLUDE, RETRACT AND IDENTIFY ARTERIES, VEINS, TENDONS AND NERVES IN SURGICAL PROCEDURES: Brand: STERION®  VESSEL LOOP

## (undated) DEVICE — CURAD OIL EMLUSION GAUZE 3X16

## (undated) DEVICE — STERILE POLYISOPRENE POWDER-FREE SURGICAL GLOVES WITH EMOLLIENT COATING: Brand: PROTEXIS

## (undated) DEVICE — GLOVE SUR 7 SENSICARE PI PIP GRN PWD F

## (undated) DEVICE — COVER LT HNDL RIG FOR SUR CAM DISP

## (undated) DEVICE — UPPER EXTREMITY CDS-LF: Brand: MEDLINE INDUSTRIES, INC.

## (undated) DEVICE — 3M™ TEGADERM™ TRANSPARENT FILM DRESSING FRAME STYLE, 1626W, 4 IN X 4-3/4 IN (10 CM X 12 CM), 50/CT 4CT/CASE: Brand: 3M™ TEGADERM™

## (undated) DEVICE — DRAIN CHANNEL 10FR BLAKE

## (undated) DEVICE — SPONGE GZ 4X4IN COT 12 PLY TYP VII WVN C

## (undated) DEVICE — UNDYED BRAIDED (POLYGLACTIN 910), SYNTHETIC ABSORBABLE SUTURE: Brand: COATED VICRYL

## (undated) DEVICE — MEGADYNE ELECTRODE ADULT PT RT

## (undated) DEVICE — SOL NACL IRRIG 0.9% 1000ML BTL

## (undated) DEVICE — TOWEL SURG OR 17X26IN BLUE

## (undated) DEVICE — ELECTRODE ES 2.75IN PTFE BLDE MOD E-Z CLN

## (undated) DEVICE — 3.0MM PRECISION NEURO (MATCH HEAD)

## (undated) DEVICE — SUT COAT VCRL+ 2-0 18IN CP-2 ABSRB UD ANTIBAC

## (undated) DEVICE — SLING

## (undated) DEVICE — GAUZE TRAY STERILE 4X4 12PLY

## (undated) DEVICE — REMOVER LOT 4OZ N IRRIG UNSCNT SFT MOIST LIQ

## (undated) DEVICE — OIL CARTRIDGE: Brand: CORE, MAESTRO

## (undated) DEVICE — SUT MONOCRYL 5-0 P-3 Y493G

## (undated) DEVICE — WRAP THERAPEUTIC BACK WO GEL P

## (undated) DEVICE — SOLUTION  .9 1000ML BTL

## (undated) DEVICE — HEAD AND NECK CDS-LF: Brand: MEDLINE INDUSTRIES, INC.

## (undated) DEVICE — GLOVE SUR 7.5 SENSICARE PI PIP CRM PWD F

## (undated) DEVICE — KIT HEMSTAT MTRX 8ML PORCINE GEL HUM THROM

## (undated) DEVICE — DIFFUSER: Brand: CORE, MAESTRO

## (undated) DEVICE — ENDOSCOPY PACK - LOWER: Brand: MEDLINE INDUSTRIES, INC.

## (undated) DEVICE — 3M™ TEGADERM™ +PAD FILM DRESSING WITH NON-ADHERENT PAD, 3587, 3-1/2 IN X 4-1/8 IN (9 CM X 10.5 CM), 25/CAR, 4 CAR/CS: Brand: 3M™ TEGADERM™

## (undated) DEVICE — DRAPE,TOWEL,LARGE,INVISISHIELD: Brand: MEDLINE

## (undated) DEVICE — Device: Brand: INTELLICART™

## (undated) DEVICE — SUT MCRYL 3-0 27IN ABSRB UD 19MM PS-2 3/8

## (undated) DEVICE — SUT VICRYL 0 UR-6 J603H

## (undated) DEVICE — 3M™ STERI-STRIP™ REINFORCED ADHESIVE SKIN CLOSURES, R1547, 1/2 IN X 4 IN (12 MM X 100 MM), 6 STRIPS/ENVELOPE: Brand: 3M™ STERI-STRIP™

## (undated) DEVICE — APPLICATOR PREP 26ML CHG 2% ISO ALC 70%

## (undated) DEVICE — PAD,NON-ADHERENT,3X8,STERILE,LF,1/PK: Brand: MEDLINE

## (undated) DEVICE — SUT MONOCRYL 3-0 PS-2 Y427H

## (undated) DEVICE — SUT COAT VCRL+ 0 27IN UR-6 ABSRB VLT ANTIBACT

## (undated) DEVICE — Device: Brand: DEFENDO AIR/WATER/SUCTION AND BIOPSY VALVE

## (undated) DEVICE — GOWN,SIRUS,FABRIC-REINFORCED,X-LARGE: Brand: MEDLINE

## (undated) DEVICE — ALCOHOL 70% 4 OZ

## (undated) DEVICE — MEGADYNE E-Z CLEAN BLADE 2.75"

## (undated) DEVICE — SLEEVE COMPR MD KNEE LEN SGL USE KENDALL SCD

## (undated) DEVICE — DISPOSABLE BIPOLAR FORCEPS 4" (10.2CM) JEWELERS, STRAIGHT 0.4MM TIP AND 12 FT. (3.6M) CABLE: Brand: KIRWAN

## (undated) DEVICE — SOLUTION IRRIG 1000ML 0.9% NACL USP BTL

## (undated) DEVICE — GLOVE,SURG,SENSICARE,ALOE,LF,PF,7: Brand: MEDLINE

## (undated) DEVICE — NEEDLE SPNL 22GA L5IN BLK HUB QNCKE BVL DISP

## (undated) DEVICE — DISPOSABLE SLIM BIPOLAR FORCEPS, NON-STICK,: Brand: SPETZLER-MALIS

## (undated) DEVICE — DISPOSABLE TOURNIQUET CUFF SINGLE BLADDER, DUAL PORT AND QUICK CONNECT CONNECTOR: Brand: COLOR CUFF

## (undated) NOTE — ED AVS SNAPSHOT
[facility] Emergency Department  [place]  [address] [address]  Phone: [phone]  Fax: Marcos   MRN: [id number]    Department: [facility] Emergency Department   Date of Visit: 1/27/2021 Our Emergency Department is dedicated to providing you with excellent care and a patient-centered experience.  Feedback is appreciated, and can be communicated via the electronic (email/text) assessment survey that you will receive within 24 hours of your v

## (undated) NOTE — LETTER
Patient Name: Lesly Laboy  Surgery Date: 7/19/2022  CSN: [id number]  Medical Record: [id number]     Surgeon(s):  Elaine Zabala MD  Consent Procedure: EXCISION OF NECK CYST WITH INTERMEDIATE CLOSURE  Anesthesia Type: Local      Please clarify laterality for \"neck cyst\". Please fax update Surgical Case request to [phone].       Please call Pre-Admission testing with any questions [phone]      Thank you

## (undated) NOTE — ED AVS SNAPSHOT
THE [facility] Emergency Department in [address]    Phone:  [phone]    Fax:  [address]    MRN: [id number]    Department:  THE [facility] Emergency Department in [place]   Date of Visit: IF THERE IS ANY CHANGE OR WORSENING OF YOUR CONDITION, CALL YOUR PRIMARY CARE PHYSICIAN AT ONCE OR RETURN IMMEDIATELY TO THE EMERGENCY DEPARTMENT.     If you have been prescribed any medication(s), please fill your prescription right away and begin taking t

## (undated) NOTE — LETTER
OSR/CHRIS Notification    Patient Name: Radha Winkler / Sex: 12/18/1970-A: 45 y  male  Medical Records: [id number] CSN: [id number]    Surgeon(s):  Surgeon(s):  Patrick Izaguirre MD   Procedure: Excision of right volar ganglion cyst, right endoscopic carpal tunnel wrist possible open, and right ulnar nerve transposition    Anesthesia Type: Regional Surgery Date: 4/26/23    During the pre-operative screening process using the STOP-Bang questionnaire, the patient listed above was identified as being at high risk for obstructive sleep apnea. If you feel it is appropriate to schedule a sleep study, the [facility] will give priority to patients scheduled for procedures to minimize surgical delays. If a sleep study is completed prior to surgery, please fax the results/plan of care to Mac Burroughs ([phone]) as this information will be utilized in clinical decision-making regarding the patient's upcoming surgery. Thank you for your assistance in helping us provide a safe, seamless and personal experience for your patient.     MD Melissa Griffith, Department of Anesthesia

## (undated) NOTE — LETTER
[address]  [address]  Authorization for Surgical Operation and Procedure     Date:___________                                                                                                         Time:__________  I hereby authorize Surgeon(s):  Castro Reed MD, my physician and his/her assistants (if applicable), which may include medical students, residents, and/or fellows, to perform the following surgical operation/ procedure and administer such anesthesia as may be determined necessary by my physician:  Operation/Procedure name (s) Procedure(s):  Right lumbar 5-sacral 1, sacral 1- 2 TRANSFORAMINAL   EPIDURAL STEROID INJECTION MULTIPLE LEVEL with sedation on Valerio NEELIMA Destinee   2.   I recognize that during the surgical operation/procedure, unforeseen conditions may necessitate additional or different procedures than those listed above.  I, therefore, further authorize and request that the above-named surgeon, assistants, or designees perform such procedures as are, in their judgment, necessary and desirable.    3.   My surgeon/physician has discussed prior to my surgery the potential benefits, risks and side effects of this procedure; the likelihood of achieving goals; and potential problems that might occur during recuperation.  They also discussed reasonable alternatives to the procedure, including risks, benefits, and side effects related to the alternatives and risks related to not receiving this procedure.  I have had all my questions answered and I acknowledge that no guarantee has been made as to the result that may be obtained.    4.   Should the need arise during my operation/procedure, which includes change of level of care prior to discharge, I also consent to the administration of blood and/or blood products.  Further, I understand that despite careful testing and screening of blood or blood products by collecting agencies, I may still be subject to ill  effects as a result of receiving a blood transfusion and/or blood products.  The following are some, but not all, of the potential risks that can occur: fever and allergic reactions, hemolytic reactions, transmission of diseases such as Hepatitis, AIDS and Cytomegalovirus (CMV) and fluid overload.  In the event that I wish to have an autologous transfusion of my own blood, or a directed donor transfusion, I will discuss this with my physician.  Check only if Refusing Blood or Blood Products  I understand refusal of blood or blood products as deemed necessary by my physician may have serious consequences to my condition to include possible death. I hereby assume responsibility for my refusal and release the hospital, its personnel, and my physicians from any responsibility for the consequences of my refusal.          o  Refuse      5.   I authorize the use of any specimen, organs, tissues, body parts or foreign objects that may be removed from my body during the operation/procedure for diagnosis, research or teaching purposes and their subsequent disposal by hospital authorities.  I also authorize the release of specimen test results and/or written reports to my treating physician on the hospital medical staff or other referring or consulting physicians involved in my care, at the discretion of the Pathologist or my treating physician.    6.   I consent to the photographing or videotaping of the operations or procedures to be performed, including appropriate portions of my body for medical, scientific, or educational purposes, provided my identity is not revealed by the pictures or by descriptive texts accompanying them.  If the procedure has been photographed/videotaped, the surgeon will obtain the original picture, image, videotape or CD.  The hospital will not be responsible for storage, release or maintenance of the picture, image, tape or CD.    7.   I consent to the presence of a  or observers  in the operating room as deemed necessary by my physician or their designees.    8.   I recognize that in the event my procedure results in extended X-Ray/fluoroscopy time, I may develop a skin reaction.    9. If I have a Do Not Attempt Resuscitation (DNAR) order in place, that status will be suspended while in the operating room, procedural suite, and during the recovery period unless otherwise explicitly stated by me (or a person authorized to consent on my behalf). The surgeon or my attending physician will determine when the applicable recovery period ends for purposes of reinstating the DNAR order.  10. Patients having a sterilization procedure: I understand that if the procedure is successful the results will be permanent and it will therefore be impossible for me to inseminate, conceive, or bear children.  I also understand that the procedure is intended to result in sterility, although the result has not been guaranteed.   11. I acknowledge that my physician has explained sedation/analgesia administration to me including the risk and benefits I consent to the administration of sedation/analgesia as may be necessary or desirable in the judgment of my physician.    I CERTIFY THAT I HAVE READ AND FULLY UNDERSTAND THE ABOVE CONSENT TO OPERATION and/or OTHER PROCEDURE.    _________________________________________  __________________________________  Signature of Patient     Signature of Responsible Person         ___________________________________         Printed Name of Responsible Person           _________________________________                 Relationship to Patient  _________________________________________  ______________________________  Signature of Witness          Date  Time      Patient Name: Valerio Felipe     : 1970                 Printed: 2024     Medical Record #: [id number]                     Page 1 of 2                                    [address]  [place]  [address]    Consent for Anesthesia    I, Valerio Felipe agree to be cared for by an anesthesiologist, who is specially trained to monitor me and give me medicine to put me to sleep or keep me comfortable during my procedure    I understand that my anesthesiologist is not an employee or agent of [facility] or [employer]. He or she works for [employer].    As the patient asking for anesthesia services, I agree to:  Allow the anesthesiologist (anesthesia doctor) to give me medicine and do additional procedures as necessary. Some examples are: Starting or using an “IV” to give me medicine, fluids or blood during my procedure, and having a breathing tube placed to help me breathe when I’m asleep (intubation). In the event that my heart stops working properly, I understand that my anesthesiologist will make every effort to sustain my life, unless otherwise directed by [facility] Do Not Resuscitate documents.  Tell my anesthesia doctor before my procedure:  If I am pregnant.  The last time that I ate or drank.  All of the medicines I take (including prescriptions, herbal supplements, and pills I can buy without a prescription (including street drugs/illegal medications). Failure to inform my anesthesiologist about these medicines may increase my risk of anesthetic complications.  If I am allergic to anything or have had a reaction to anesthesia before.  I understand how the anesthesia medicine will help me (benefits).  I understand that with any type of anesthesia medicine there are risks:  The most common risks are: nausea, vomiting, sore throat, muscle soreness, damage to my eyes, mouth, or teeth (from breathing tube placement).  Rare risks include: remembering what happened during my procedure, allergic reactions to medications, injury to my airway, heart, lungs, vision, nerves, or muscles and in extremely rare instances death.  My doctor has explained to me  other choices available to me for my care (alternatives).  Pregnant Patients (“epidural”):  I understand that the risks of having an epidural (medicine given into my back to help control pain during labor), include itching, low blood pressure, difficulty urinating, headache or slowing of the baby’s heart. Very rare risks include infection, bleeding, seizure, irregular heart rhythms and nerve injury.  Regional Anesthesia (“spinal”, “epidural”, & “nerve blocks”):  I understand that rare but potential complications include headache, bleeding, infection, seizure, irregular heart rhythms, and nerve injury.    I can change my mind about having anesthesia services at any time before I get the medicine.    _____________________________________________________________________________  Patient (or Representative) Signature/Relationship to Patient  Date   Time    _____________________________________________________________________________   Name (if used)    Language/Organization   Time    _____________________________________________________________________________  Anesthesiologist Signature     Date   Time  I have discussed the procedure and information above with the patient (or patient’s representative) and answered their questions. The patient or their representative has agreed to have anesthesia services.    _____________________________________________________________________________  Witness        Date   Time  I have verified that the signature is that of the patient or patient’s representative, and that it was signed before the procedure  Patient Name: Valerio Felipe     : 1970                 Printed: 2024     Medical Record #: [id number]                     Page 2 of 2

## (undated) NOTE — Clinical Note
03/10/2017        Alvin Felipe  [address]  [facility] [address]      [facility] records indicate that you have outstanding lab work and or testing that was ordered for you and has not yet been completed:      CMP  LIPID PANEL  TSH  Vitami

## (undated) NOTE — MR AVS SNAPSHOT
[address] [address] [address]  [address] [place] [place] [facility] Barbara Groves  [phone]               Thank you for choosing us for your health care visit with Loren Hagan MD.  We are glad to serve you and happy to provide you with t Take 1 tablet (1 mg total) by mouth 2 (two) times daily as needed for Anxiety.    Commonly known as:  KLONOPIN           escitalopram 10 MG Tabs   Take 1/2 tab PO daily for 3 days, then 1 tab PO daily   What changed:    - how much to take  - additional inst (BMI 30-39. 9) [E66.9], Essential hypertension [I10], Pure hypercholesterolemia [E78.00]           TSH    Complete by: Feb 08, 2017 (Approximate)    Assoc Dx:  Encounter for therapeutic drug monitoring [Z51.81], Obesity (BMI 30-39. 9) [E66.9], Essential hyp

## (undated) NOTE — ED AVS SNAPSHOT
THE [facility] Emergency Department in [address] [facility]    Phone:  [phone]    Fax:  [address]    MRN: [id number]    Department:  THE [facility] Emergency Department in [place]   Date of Visit: Jul 13, 2017  1:30 PM   EXAM-ESTABLISHED with Jason Patient, MD   [address] ([facility])    [facility]  [address] [place] [address] [address]  [address] [id number] [phone]                 Medication Inf receive this, we would really appreciate it if you could take the time to complete it. Thank you! You were examined and treated today on an urgent basis only. This was not a substitute for ongoing medical care.  Often, one Emergency Department visit d Preston Gonzalez [address] ([address]  Roseanne ([facility]) [address] [facility] [place] ([facility]) [phone] [address] ([address]) [phone]-

## (undated) NOTE — LETTER
05/05/21        [address] [facility] [address] [id number]      Dear Baldev Morejon,    [address] [facility] records indicate that you have outstanding lab work and or testing that was ordered for you and has not yet been completed:  Orders Placed This Encounter

## (undated) NOTE — LETTER
[facility] CARE IN [place]  [address] [address]  Dept: [phone]  Dept Fax: [phone]         August 30, 2017    Patient: Reno Orourke   YOB: 1970   Date of Visit: 8/30/2017       To Whom It May Concern

## (undated) NOTE — LETTER
OSR/CHRIS Notification    Patient Name: Valerio Felipe  -Age / Sex: 1970-A: 53 y  male  Medical Records: [id number] [facility]: [id number]    Surgeon(s):  Surgeon(s):  Mark Rebolledo MD   Procedure: Right Lumbar 5-Sacrum 1 microdiscectomy    Anesthesia Type: General Surgery Date: 2024    During the pre-operative screening process using the STOP-Bang questionnaire, the patient listed above was identified as being at high risk for obstructive sleep apnea.    If you feel it is appropriate to schedule a sleep study, the [facility] will give priority to patients scheduled for procedures to minimize surgical delays.     If a sleep study is completed prior to surgery, please fax the results/plan of care to Pre-Admission Testing ([phone]) as this information will be utilized in clinical decision-making regarding the patient's upcoming surgery.    Thank you for your assistance in helping us provide a safe, seamless and personal experience for your patient.    Daniel Medina MD  , Department of Anesthesia

## (undated) NOTE — LETTER
24    Orthopedic Surgery   Pre-Operative Clearance Request    Patient Name:   Valerio Felipe             :   1970    Surgeon: Dr. Altman             Date of Surgery: 3/8/24    Surgical Procedure: LEFT KNEE ARTHROSCOPY, PARTIAL MEDIAL MENISCECTOMY AND CHONDROPLASTY.       MUST COMPLETE ALL OF THE FOLLOWING 2-3 WEEKS PRIOR TO YOUR SURGERY TO AVOID CANCELLATION, DUE TO THE RULE THEIR WILL BE NO EXCEPTIONS!      [x]  History and Physical        [x]  Medical  Clearance                     Required pre-op testing to be ordered: N/A                             **Please fax test results, H&P, and clearance to [phone] and to P.A[facility] at [phone]**

## (undated) NOTE — MR AVS SNAPSHOT
[facility] [address]  [address] [facility] [address] [place]  [phone]               Thank you for choosing us for your health care visit with Gretchen Woods MD.  We are glad to serve you and happy to provide you with t Take 1 tablet (1 mg total) by mouth 2 (two) times daily as needed for Anxiety. Commonly known as:  KLONOPIN           ergocalciferol 94598 units Caps   Take 1 capsule (50,000 Units total) by mouth once a week.    Commonly known as:  DRISDOL/VITAMIN D2 discharge instructions in [facility] by going to Visits < Admission Summaries. If you've been to the Emergency Department or your doctor's office, you can view your past visit information in [facility] by going to Visits < Visit Summaries. [employer] questions?